# Patient Record
Sex: MALE | Race: BLACK OR AFRICAN AMERICAN | NOT HISPANIC OR LATINO | Employment: OTHER | URBAN - METROPOLITAN AREA
[De-identification: names, ages, dates, MRNs, and addresses within clinical notes are randomized per-mention and may not be internally consistent; named-entity substitution may affect disease eponyms.]

---

## 2019-05-25 ENCOUNTER — HOSPITAL ENCOUNTER (INPATIENT)
Facility: HOSPITAL | Age: 64
LOS: 2 days | Discharge: HOME | DRG: 287 | End: 2019-05-29
Attending: FAMILY MEDICINE | Admitting: INTERNAL MEDICINE
Payer: MEDICARE

## 2019-05-25 DIAGNOSIS — I50.21 ACUTE SYSTOLIC CONGESTIVE HEART FAILURE: ICD-10-CM

## 2019-05-25 DIAGNOSIS — R06.02 SHORTNESS OF BREATH: ICD-10-CM

## 2019-05-25 DIAGNOSIS — I49.9 CARDIAC ARRHYTHMIA, UNSPECIFIED CARDIAC ARRHYTHMIA TYPE: ICD-10-CM

## 2019-05-25 DIAGNOSIS — I50.42 CHRONIC COMBINED SYSTOLIC AND DIASTOLIC CONGESTIVE HEART FAILURE: ICD-10-CM

## 2019-05-25 DIAGNOSIS — E87.3 METABOLIC ALKALOSIS: ICD-10-CM

## 2019-05-25 DIAGNOSIS — I49.9 CARDIAC RHYTHM DISORDER OR DISTURBANCE OR CHANGE: ICD-10-CM

## 2019-05-25 DIAGNOSIS — J45.41 MODERATE PERSISTENT ASTHMA WITH EXACERBATION: ICD-10-CM

## 2019-05-25 DIAGNOSIS — I50.9 ACUTE CONGESTIVE HEART FAILURE, UNSPECIFIED HEART FAILURE TYPE: Primary | ICD-10-CM

## 2019-05-25 DIAGNOSIS — E87.79 OTHER HYPERVOLEMIA: ICD-10-CM

## 2019-05-25 PROBLEM — R60.9 EDEMA: Status: ACTIVE | Noted: 2019-05-25

## 2019-05-25 LAB
ALBUMIN SERPL BCP-MCNC: 3 G/DL (ref 3.5–5.2)
ALP SERPL-CCNC: 112 U/L (ref 38–126)
ALT SERPL W/O P-5'-P-CCNC: 63 U/L (ref 10–44)
ANION GAP SERPL CALC-SCNC: 1 MMOL/L (ref 8–16)
AST SERPL-CCNC: 33 U/L (ref 15–46)
BASOPHILS # BLD AUTO: 0.03 K/UL (ref 0–0.2)
BASOPHILS NFR BLD: 0.4 % (ref 0–1.9)
BILIRUB SERPL-MCNC: 0.5 MG/DL (ref 0.1–1)
BUN SERPL-MCNC: 19 MG/DL (ref 2–20)
CALCIUM SERPL-MCNC: 8.6 MG/DL (ref 8.7–10.5)
CHLORIDE SERPL-SCNC: 104 MMOL/L (ref 95–110)
CO2 SERPL-SCNC: 34 MMOL/L (ref 23–29)
CREAT SERPL-MCNC: 1.28 MG/DL (ref 0.5–1.4)
D DIMER PPP FEU-MCNC: 254 NG/ML
DIFFERENTIAL METHOD: ABNORMAL
EOSINOPHIL # BLD AUTO: 0.6 K/UL (ref 0–0.5)
EOSINOPHIL NFR BLD: 8.7 % (ref 0–8)
ERYTHROCYTE [DISTWIDTH] IN BLOOD BY AUTOMATED COUNT: 16.6 % (ref 11.5–14.5)
EST. GFR  (AFRICAN AMERICAN): >60 ML/MIN/1.73 M^2
EST. GFR  (NON AFRICAN AMERICAN): 59.6 ML/MIN/1.73 M^2
GLUCOSE SERPL-MCNC: 99 MG/DL (ref 70–110)
HCT VFR BLD AUTO: 42.9 % (ref 40–54)
HGB BLD-MCNC: 13.8 G/DL (ref 14–18)
INR PPP: 1.1 (ref 0.8–1.2)
LYMPHOCYTES # BLD AUTO: 1.5 K/UL (ref 1–4.8)
LYMPHOCYTES NFR BLD: 21 % (ref 18–48)
MCH RBC QN AUTO: 26.7 PG (ref 27–31)
MCHC RBC AUTO-ENTMCNC: 32.2 G/DL (ref 32–36)
MCV RBC AUTO: 83 FL (ref 82–98)
MONOCYTES # BLD AUTO: 0.6 K/UL (ref 0.3–1)
MONOCYTES NFR BLD: 8 % (ref 4–15)
NEUTROPHILS # BLD AUTO: 4.5 K/UL (ref 1.8–7.7)
NEUTROPHILS NFR BLD: 61.8 % (ref 38–73)
NT-PROBNP: 4680 PG/ML (ref 5–900)
PLATELET # BLD AUTO: 174 K/UL (ref 150–350)
PMV BLD AUTO: 12.8 FL (ref 9.2–12.9)
POTASSIUM SERPL-SCNC: 3.8 MMOL/L (ref 3.5–5.1)
PROT SERPL-MCNC: 5.5 G/DL (ref 6–8.4)
PROTHROMBIN TIME: 11.6 SEC (ref 9–12.5)
RBC # BLD AUTO: 5.16 M/UL (ref 4.6–6.2)
SODIUM SERPL-SCNC: 139 MMOL/L (ref 136–145)
TROPONIN I SERPL DL<=0.01 NG/ML-MCNC: <0.012 NG/ML (ref 0.01–0.03)
WBC # BLD AUTO: 7.25 K/UL (ref 3.9–12.7)

## 2019-05-25 PROCEDURE — 85610 PROTHROMBIN TIME: CPT | Mod: ER

## 2019-05-25 PROCEDURE — 99285 EMERGENCY DEPT VISIT HI MDM: CPT | Mod: 25,ER

## 2019-05-25 PROCEDURE — 85025 COMPLETE CBC W/AUTO DIFF WBC: CPT | Mod: ER

## 2019-05-25 PROCEDURE — 93010 ELECTROCARDIOGRAM REPORT: CPT | Mod: 76,,, | Performed by: INTERNAL MEDICINE

## 2019-05-25 PROCEDURE — 83880 ASSAY OF NATRIURETIC PEPTIDE: CPT | Mod: ER

## 2019-05-25 PROCEDURE — 96374 THER/PROPH/DIAG INJ IV PUSH: CPT | Mod: ER

## 2019-05-25 PROCEDURE — 84484 ASSAY OF TROPONIN QUANT: CPT | Mod: ER

## 2019-05-25 PROCEDURE — 25000003 PHARM REV CODE 250: Mod: ER | Performed by: FAMILY MEDICINE

## 2019-05-25 PROCEDURE — G0378 HOSPITAL OBSERVATION PER HR: HCPCS

## 2019-05-25 PROCEDURE — 63600175 PHARM REV CODE 636 W HCPCS: Performed by: STUDENT IN AN ORGANIZED HEALTH CARE EDUCATION/TRAINING PROGRAM

## 2019-05-25 PROCEDURE — 96376 TX/PRO/DX INJ SAME DRUG ADON: CPT

## 2019-05-25 PROCEDURE — 94640 AIRWAY INHALATION TREATMENT: CPT

## 2019-05-25 PROCEDURE — 93005 ELECTROCARDIOGRAM TRACING: CPT | Mod: ER

## 2019-05-25 PROCEDURE — 63600175 PHARM REV CODE 636 W HCPCS: Performed by: FAMILY MEDICINE

## 2019-05-25 PROCEDURE — 25500020 PHARM REV CODE 255: Mod: ER | Performed by: FAMILY MEDICINE

## 2019-05-25 PROCEDURE — 94761 N-INVAS EAR/PLS OXIMETRY MLT: CPT

## 2019-05-25 PROCEDURE — 93010 EKG 12-LEAD: ICD-10-PCS | Mod: 76,,, | Performed by: INTERNAL MEDICINE

## 2019-05-25 PROCEDURE — 63600175 PHARM REV CODE 636 W HCPCS: Mod: ER | Performed by: FAMILY MEDICINE

## 2019-05-25 PROCEDURE — 96372 THER/PROPH/DIAG INJ SC/IM: CPT | Mod: 59

## 2019-05-25 PROCEDURE — 99900035 HC TECH TIME PER 15 MIN (STAT)

## 2019-05-25 PROCEDURE — 80053 COMPREHEN METABOLIC PANEL: CPT | Mod: ER

## 2019-05-25 PROCEDURE — 94760 N-INVAS EAR/PLS OXIMETRY 1: CPT | Mod: ER

## 2019-05-25 PROCEDURE — 25000242 PHARM REV CODE 250 ALT 637 W/ HCPCS: Performed by: STUDENT IN AN ORGANIZED HEALTH CARE EDUCATION/TRAINING PROGRAM

## 2019-05-25 PROCEDURE — 85379 FIBRIN DEGRADATION QUANT: CPT | Mod: ER

## 2019-05-25 RX ORDER — FUROSEMIDE 10 MG/ML
40 INJECTION INTRAMUSCULAR; INTRAVENOUS ONCE
Status: COMPLETED | OUTPATIENT
Start: 2019-05-25 | End: 2019-05-25

## 2019-05-25 RX ORDER — ALBUTEROL SULFATE 90 UG/1
2 AEROSOL, METERED RESPIRATORY (INHALATION) EVERY 6 HOURS PRN
Status: DISCONTINUED | OUTPATIENT
Start: 2019-05-25 | End: 2019-05-29 | Stop reason: HOSPADM

## 2019-05-25 RX ORDER — FUROSEMIDE 20 MG/1
20 TABLET ORAL 2 TIMES DAILY
Status: DISCONTINUED | OUTPATIENT
Start: 2019-05-26 | End: 2019-05-27

## 2019-05-25 RX ORDER — FUROSEMIDE 10 MG/ML
80 INJECTION INTRAMUSCULAR; INTRAVENOUS
Status: COMPLETED | OUTPATIENT
Start: 2019-05-25 | End: 2019-05-25

## 2019-05-25 RX ORDER — FUROSEMIDE 20 MG/1
20 TABLET ORAL 2 TIMES DAILY
Status: DISCONTINUED | OUTPATIENT
Start: 2019-05-25 | End: 2019-05-25

## 2019-05-25 RX ORDER — LISINOPRIL 20 MG/1
20 TABLET ORAL DAILY
Status: DISCONTINUED | OUTPATIENT
Start: 2019-05-26 | End: 2019-05-29 | Stop reason: HOSPADM

## 2019-05-25 RX ORDER — SODIUM CHLORIDE 0.9 % (FLUSH) 0.9 %
10 SYRINGE (ML) INJECTION
Status: DISCONTINUED | OUTPATIENT
Start: 2019-05-25 | End: 2019-05-29 | Stop reason: HOSPADM

## 2019-05-25 RX ORDER — FLUTICASONE FUROATE AND VILANTEROL 100; 25 UG/1; UG/1
1 POWDER RESPIRATORY (INHALATION) DAILY
Status: DISCONTINUED | OUTPATIENT
Start: 2019-05-26 | End: 2019-05-29 | Stop reason: HOSPADM

## 2019-05-25 RX ORDER — ENOXAPARIN SODIUM 100 MG/ML
40 INJECTION SUBCUTANEOUS EVERY 24 HOURS
Status: DISCONTINUED | OUTPATIENT
Start: 2019-05-25 | End: 2019-05-29 | Stop reason: HOSPADM

## 2019-05-25 RX ORDER — ASPIRIN 325 MG
325 TABLET, DELAYED RELEASE (ENTERIC COATED) ORAL
Status: COMPLETED | OUTPATIENT
Start: 2019-05-25 | End: 2019-05-25

## 2019-05-25 RX ADMIN — ENOXAPARIN SODIUM 40 MG: 100 INJECTION SUBCUTANEOUS at 05:05

## 2019-05-25 RX ADMIN — FUROSEMIDE 80 MG: 10 INJECTION, SOLUTION INTRAMUSCULAR; INTRAVENOUS at 11:05

## 2019-05-25 RX ADMIN — IOHEXOL 100 ML: 350 INJECTION, SOLUTION INTRAVENOUS at 01:05

## 2019-05-25 RX ADMIN — FUROSEMIDE 40 MG: 10 INJECTION, SOLUTION INTRAMUSCULAR; INTRAVENOUS at 05:05

## 2019-05-25 RX ADMIN — ALBUTEROL SULFATE 2 PUFF: 90 AEROSOL, METERED RESPIRATORY (INHALATION) at 08:05

## 2019-05-25 RX ADMIN — ASPIRIN 325 MG: 325 TABLET, COATED ORAL at 01:05

## 2019-05-25 NOTE — PLAN OF CARE
Problem: Adult Inpatient Plan of Care  Goal: Plan of Care Review  Pt AAO x 4.  VSS.  Pt remained afebrile throughout this shift.   Pt remained free of falls this shift.    Plan of care reviewed. Patient verbalizes understanding.   Pt moving/turing independently. Frequent weight shifting encouraged.  Patient SR on monitor.   Bed low, side rails up x 2, wheels locked, call light in reach.   Pt family member remained at bedside most of shift.   Bed alarm maintained for safety.   Patient instructed to call for assistance.   Hourly rounding completed.   .Will continue to monitor.

## 2019-05-25 NOTE — ED PROVIDER NOTES
"Encounter Date: 5/25/2019       History     Chief Complaint   Patient presents with    ankle/feet swelling     "My ankles swole up and my feet for like a week now" also c/o SOB x 2 days "but I got asthma"     63-year-old male presents with chief complaint ankle swelling which started 1 week ago.  Patient denies any chest pain but does report shortness of breath.  Reports does have a history of asthma.  Denies any fever chills.        Review of patient's allergies indicates:  No Known Allergies  Past Medical History:   Diagnosis Date    Asthma     COPD (chronic obstructive pulmonary disease)      No past surgical history on file.  No family history on file.  Social History     Tobacco Use    Smoking status: Never Smoker   Substance Use Topics    Alcohol use: No    Drug use: No     Review of Systems   Respiratory: Positive for cough and shortness of breath. Negative for wheezing.    Cardiovascular: Negative for chest pain.   All other systems reviewed and are negative.      Physical Exam     Initial Vitals [05/25/19 1113]   BP Pulse Resp Temp SpO2   (!) 141/83 97 (!) 22 98.1 °F (36.7 °C) 97 %      MAP       --         Physical Exam    Nursing note and vitals reviewed.  Constitutional: He appears well-developed and well-nourished.   HENT:   Head: Normocephalic.   Eyes: Conjunctivae and EOM are normal. Pupils are equal, round, and reactive to light.   Neck: Normal range of motion. Neck supple.   Cardiovascular: Normal rate and regular rhythm. Exam reveals gallop and S3.    4+ pitting edema bilaterally   Pulmonary/Chest: Breath sounds normal.   Abdominal: Soft. Bowel sounds are normal.   Musculoskeletal: Normal range of motion. He exhibits edema.   Neurological: He is alert and oriented to person, place, and time. He has normal strength. GCS score is 15. GCS eye subscore is 4. GCS verbal subscore is 5. GCS motor subscore is 6.   Skin: Skin is warm and dry. Capillary refill takes less than 2 seconds.   Psychiatric: " He has a normal mood and affect. His behavior is normal.         ED Course   Procedures  Labs Reviewed   CBC W/ AUTO DIFFERENTIAL - Abnormal; Notable for the following components:       Result Value    Hemoglobin 13.8 (*)     Mean Corpuscular Hemoglobin 26.7 (*)     RDW 16.6 (*)     Eos # 0.6 (*)     Eosinophil% 8.7 (*)     All other components within normal limits   COMPREHENSIVE METABOLIC PANEL - Abnormal; Notable for the following components:    CO2 34 (*)     Calcium 8.6 (*)     Total Protein 5.5 (*)     Albumin 3.0 (*)     ALT 63 (*)     Anion Gap 1 (*)     eGFR if non  59.6 (*)     All other components within normal limits   NT-PRO NATRIURETIC PEPTIDE - Abnormal; Notable for the following components:    NT-proBNP 4680 (*)     All other components within normal limits   TROPONIN I   PROTIME-INR   D DIMER     EKG Readings: (Independently Interpreted)   Normal sinus rhythm at 94 beats per minute normal P normal QRS right atrial enlargement nonspecific intraventricular block anterior lateral Q-waves noted       Imaging Results          X-Ray Chest AP Portable (Final result)  Result time 05/25/19 11:44:49    Final result by Dominguez Valencia III, MD (05/25/19 11:44:49)                 Impression:      Cardiomegaly without evidence of pulmonary edema.      Electronically signed by: Dominguez Valencia MD  Date:    05/25/2019  Time:    11:44             Narrative:    EXAMINATION:  XR CHEST AP PORTABLE    CLINICAL HISTORY:  CHF;    COMPARISON:  01/08/2015    FINDINGS:  There is mild cardiomegaly without evidence of pulmonary edema.  The lungs appear clear of active disease. No acute appearing infiltrate, pleural effusion or pneumothorax identified.                                 Medical Decision Making:   Differential Diagnosis:   CHF, pulmonary embolism, peripheral edema, cellulitis  Clinical Tests:   Lab Tests: Ordered and Reviewed  The following lab test(s) were unremarkable: CBC, CMP and  BNP  Radiological Study: Reviewed and Ordered  Medical Tests: Ordered and Reviewed  ED Management:  Patient initially seen and managed.  Signs and symptoms consistent with congestive heart failure likely right-sided heart failure.  In light of patient's history CPAP and are home oxygen therapy which the patient has been noncompliant with suspected significant right-sided failure.  Will also obtain a CT angiogram in light of the slightly elevated D-dimer, sedentary lifestyle and significantly elevated BNP.  Other:   I have discussed this case with another health care provider.       <> Summary of the Discussion: Discussed with Dr. Anaya who agrees to admit the patient to the telemetry unit for additional evaluation and treatment                      Clinical Impression:       ICD-10-CM ICD-9-CM   1. Acute congestive heart failure, unspecified heart failure type I50.9 428.0   2. Shortness of breath R06.02 786.05                                Skyler Dhillon MD  05/26/19 0642       Skyler Dhillon MD  05/26/19 0644
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
## 2019-05-26 LAB
ALBUMIN SERPL BCP-MCNC: 3.1 G/DL (ref 3.5–5.2)
ALP SERPL-CCNC: 83 U/L (ref 55–135)
ALT SERPL W/O P-5'-P-CCNC: 47 U/L (ref 10–44)
ANION GAP SERPL CALC-SCNC: 9 MMOL/L (ref 8–16)
AST SERPL-CCNC: 23 U/L (ref 10–40)
BASOPHILS # BLD AUTO: 0.02 K/UL (ref 0–0.2)
BASOPHILS NFR BLD: 0.3 % (ref 0–1.9)
BILIRUB SERPL-MCNC: 0.8 MG/DL (ref 0.1–1)
BUN SERPL-MCNC: 20 MG/DL (ref 8–23)
CALCIUM SERPL-MCNC: 9.1 MG/DL (ref 8.7–10.5)
CHLORIDE SERPL-SCNC: 99 MMOL/L (ref 95–110)
CO2 SERPL-SCNC: 34 MMOL/L (ref 23–29)
CREAT SERPL-MCNC: 1.4 MG/DL (ref 0.5–1.4)
DIFFERENTIAL METHOD: ABNORMAL
EOSINOPHIL # BLD AUTO: 0.9 K/UL (ref 0–0.5)
EOSINOPHIL NFR BLD: 12.2 % (ref 0–8)
ERYTHROCYTE [DISTWIDTH] IN BLOOD BY AUTOMATED COUNT: 15.9 % (ref 11.5–14.5)
EST. GFR  (AFRICAN AMERICAN): >60 ML/MIN/1.73 M^2
EST. GFR  (NON AFRICAN AMERICAN): 53 ML/MIN/1.73 M^2
GLUCOSE SERPL-MCNC: 94 MG/DL (ref 70–110)
HCT VFR BLD AUTO: 47.2 % (ref 40–54)
HGB BLD-MCNC: 15 G/DL (ref 14–18)
LYMPHOCYTES # BLD AUTO: 1.7 K/UL (ref 1–4.8)
LYMPHOCYTES NFR BLD: 23.8 % (ref 18–48)
MAGNESIUM SERPL-MCNC: 1.8 MG/DL (ref 1.6–2.6)
MCH RBC QN AUTO: 26.6 PG (ref 27–31)
MCHC RBC AUTO-ENTMCNC: 31.8 G/DL (ref 32–36)
MCV RBC AUTO: 84 FL (ref 82–98)
MONOCYTES # BLD AUTO: 0.4 K/UL (ref 0.3–1)
MONOCYTES NFR BLD: 5.8 % (ref 4–15)
NEUTROPHILS # BLD AUTO: 4 K/UL (ref 1.8–7.7)
NEUTROPHILS NFR BLD: 57.9 % (ref 38–73)
PLATELET # BLD AUTO: 198 K/UL (ref 150–350)
PLATELET BLD QL SMEAR: ABNORMAL
PMV BLD AUTO: 12.4 FL (ref 9.2–12.9)
POTASSIUM SERPL-SCNC: 3.7 MMOL/L (ref 3.5–5.1)
PROT SERPL-MCNC: 5.9 G/DL (ref 6–8.4)
RBC # BLD AUTO: 5.64 M/UL (ref 4.6–6.2)
SODIUM SERPL-SCNC: 142 MMOL/L (ref 136–145)
WBC # BLD AUTO: 6.94 K/UL (ref 3.9–12.7)

## 2019-05-26 PROCEDURE — 99204 PR OFFICE/OUTPT VISIT, NEW, LEVL IV, 45-59 MIN: ICD-10-PCS | Mod: ,,, | Performed by: INTERNAL MEDICINE

## 2019-05-26 PROCEDURE — 25000003 PHARM REV CODE 250: Performed by: STUDENT IN AN ORGANIZED HEALTH CARE EDUCATION/TRAINING PROGRAM

## 2019-05-26 PROCEDURE — 94761 N-INVAS EAR/PLS OXIMETRY MLT: CPT

## 2019-05-26 PROCEDURE — 99900035 HC TECH TIME PER 15 MIN (STAT)

## 2019-05-26 PROCEDURE — G0378 HOSPITAL OBSERVATION PER HR: HCPCS

## 2019-05-26 PROCEDURE — 85025 COMPLETE CBC W/AUTO DIFF WBC: CPT

## 2019-05-26 PROCEDURE — 94640 AIRWAY INHALATION TREATMENT: CPT

## 2019-05-26 PROCEDURE — 63600175 PHARM REV CODE 636 W HCPCS: Performed by: FAMILY MEDICINE

## 2019-05-26 PROCEDURE — 25000242 PHARM REV CODE 250 ALT 637 W/ HCPCS: Performed by: STUDENT IN AN ORGANIZED HEALTH CARE EDUCATION/TRAINING PROGRAM

## 2019-05-26 PROCEDURE — 80053 COMPREHEN METABOLIC PANEL: CPT

## 2019-05-26 PROCEDURE — 96372 THER/PROPH/DIAG INJ SC/IM: CPT

## 2019-05-26 PROCEDURE — 99204 OFFICE O/P NEW MOD 45 MIN: CPT | Mod: ,,, | Performed by: INTERNAL MEDICINE

## 2019-05-26 PROCEDURE — 36415 COLL VENOUS BLD VENIPUNCTURE: CPT

## 2019-05-26 PROCEDURE — 83735 ASSAY OF MAGNESIUM: CPT

## 2019-05-26 RX ORDER — CARVEDILOL 3.12 MG/1
3.12 TABLET ORAL 2 TIMES DAILY
Status: DISCONTINUED | OUTPATIENT
Start: 2019-05-26 | End: 2019-05-29 | Stop reason: HOSPADM

## 2019-05-26 RX ORDER — FLUTICASONE PROPIONATE 50 MCG
2 SPRAY, SUSPENSION (ML) NASAL DAILY
Status: DISCONTINUED | OUTPATIENT
Start: 2019-05-26 | End: 2019-05-29 | Stop reason: HOSPADM

## 2019-05-26 RX ADMIN — FLUTICASONE FUROATE AND VILANTEROL TRIFENATATE 1 PUFF: 100; 25 POWDER RESPIRATORY (INHALATION) at 07:05

## 2019-05-26 RX ADMIN — LISINOPRIL 20 MG: 20 TABLET ORAL at 08:05

## 2019-05-26 RX ADMIN — ENOXAPARIN SODIUM 40 MG: 100 INJECTION SUBCUTANEOUS at 04:05

## 2019-05-26 RX ADMIN — CARVEDILOL 3.12 MG: 3.12 TABLET, FILM COATED ORAL at 08:05

## 2019-05-26 RX ADMIN — FUROSEMIDE 20 MG: 20 TABLET ORAL at 05:05

## 2019-05-26 RX ADMIN — FUROSEMIDE 20 MG: 20 TABLET ORAL at 08:05

## 2019-05-26 NOTE — HPI
63 year old male without a history of heart disease presenting with clinical symptoms suggestive of CHF of the past 2 weeks. + edema + short of breath.         + etoh     - tobacco    -htn

## 2019-05-26 NOTE — CONSULTS
[facility]  Cardiology  Consult Note    Patient Name: Dima Quintanilla  MRN: [id number]  Admission Date: 5/25/2019  Hospital Length of Stay: 0 days  Code Status: Full Code   Attending Provider: Jordin Kincaid MD   Consulting Provider: FERNANDA Soni ANP  Primary Care Physician: Sweetie Riojas MD  Principal Problem:Acute congestive heart failure      Inpatient consult to [facility]  Consult performed by: FERNANDA Randle, ANP  Consult ordered by: Nico Payne MD        See full consult note dated 5/26/2019 by Dr. Nikunj Pabon

## 2019-05-26 NOTE — ASSESSMENT & PLAN NOTE
Responded to diuresis  Will obtain echo  Dietary education        Agree with excellent management that initiated thus far     Acei   Beta-blocker       Obtain lipid panel  HgA1c          Further recommendations to follow

## 2019-05-26 NOTE — HOSPITAL COURSE
5/25/2019-5/26/2019 Presented with SOB and LE edema. Aggressively diuresed with IV Lasix with good response. Transitioned to oral Lasix. Concerned about CHF-systolic etiology therefore echocardiogram orderd  5/27/2019 On Coreg BID along with daily Lisinopril with transition to oral Lasix BID. 650cc out overnight and negative 4.1 liters since admission. Echocardiogram today with following results:   · Severely decreased left ventricular systolic function. The estimated ejection fraction is 10%  · Grade III (severe) left ventricular diastolic dysfunction consistent with restrictive physiology.  · Moderate left atrial enlargement.  · Moderate mitral regurgitation.  · Septal wall has abnormal motion consistent with left bundle branch block.  · Severe global hypokinetic wall motion.  · Low normal right ventricular systolic function.  · Normal central venous pressure (3 mm Hg).  · The estimated PA systolic pressure is 27 mm Hg     Discussion in regards to concern of severely depressed LVEF and recommend LHC for further evaluation-will plan to proceed tomorrow   5/28/2019 NPO for LHC today  5/29/2019 Underwent LHC yesterday via right radial access with normal coronaries and LVEF 10-15% with LVEDP 8 mmHg. Continued on GDMT. On oral Lasix with 550cc out overnight negative 6.0 liters since admission. Right radial access stable. Will ambulate in hallway

## 2019-05-26 NOTE — H&P (VIEW-ONLY)
[facility]  Cardiology  Consult Note    Patient Name: Dima Quintanilla  MRN: [id number]  Admission Date: 5/25/2019  Hospital Length of Stay: 0 days  Code Status: Full Code   Attending Provider: KERRIU IM   Consulting Provider: Nikunj Pabon MD  Primary Care Physician: Sweetie Riojas MD  Principal Problem:Acute congestive heart failure    Patient information was obtained from patient and chart review     Consults  Subjective:     Chief Complaint:  Shortness of breath + edema     HPI:   63 year old male without a history of heart disease presenting with clinical symptoms suggestive of CHF of the past 2 weeks. + edema + short of breath.         + etoh     - tobacco    -htn        Past Medical History:   Diagnosis Date    Asthma     COPD (chronic obstructive pulmonary disease)        History reviewed. No pertinent surgical history.    Review of patient's allergies indicates:  No Known Allergies    No current facility-administered medications on file prior to encounter.      Current Outpatient Medications on File Prior to Encounter   Medication Sig    albuterol (ACCUNEB) 0.63 mg/3 mL Nebu Take 3 mLs (0.63 mg total) by nebulization every 6 (six) hours as needed.    albuterol (PROAIR HFA) 90 mcg/actuation inhaler Inhale 2 puffs into the lungs every 6 (six) hours as needed for Wheezing or Shortness of Breath (or cough).    budesonide-formoterol 160-4.5 mcg (SYMBICORT) 160-4.5 mcg/actuation HFAA Inhale 2 puffs into the lungs every 12 (twelve) hours.    lisinopril (PRINIVIL,ZESTRIL) 20 MG tablet Take 1 tablet (20 mg total) by mouth once daily.    predniSONE (DELTASONE) 20 MG tablet 3 tablets po by mouth daily x 3 days  2 tabs by mouth daily x 3 days  1 tablet by mouth daily x 3 days     Family History     None        Tobacco Use    Smoking status: Never Smoker    Smokeless tobacco: Never Used   Substance and Sexual Activity    Alcohol use: Yes     Alcohol/week: 5.4 oz     Types: 9 Cans of beer per week      Comment: Stated he drinks on Friday, Saturday, & Sunday.     Drug use: No    Sexual activity: Not on file     Review of Systems   Constitution: Negative for diaphoresis, night sweats, weight gain and weight loss.   HENT: Negative for congestion.    Eyes: Negative for blurred vision, discharge and double vision.   Cardiovascular: Negative for chest pain, claudication, cyanosis, dyspnea on exertion, irregular heartbeat, leg swelling, near-syncope, orthopnea, palpitations, paroxysmal nocturnal dyspnea and syncope.   Respiratory: Negative for cough, shortness of breath and wheezing.    Endocrine: Negative for cold intolerance, heat intolerance and polyphagia.   Hematologic/Lymphatic: Negative for adenopathy and bleeding problem. Does not bruise/bleed easily.   Skin: Negative for dry skin and nail changes.   Musculoskeletal: Negative for arthritis, back pain, falls, joint pain, myalgias and neck pain.   Gastrointestinal: Negative for bloating, abdominal pain, change in bowel habit and constipation.   Genitourinary: Negative for bladder incontinence, dysuria, flank pain, genital sores and missed menses.   Neurological: Negative for aphonia, brief paralysis, difficulty with concentration, dizziness and weakness.   Psychiatric/Behavioral: Negative for altered mental status and memory loss. The patient does not have insomnia.    Allergic/Immunologic: Negative for environmental allergies.     Objective:     Vital Signs (Most Recent):  Temp: 97.2 °F (36.2 °C) (05/26/19 1212)  Pulse: 76 (05/26/19 1212)  Resp: 16 (05/26/19 1212)  BP: 117/71 (05/26/19 1212)  SpO2: 97 % (05/26/19 1123) Vital Signs (24h Range):  Temp:  [96 °F (35.6 °C)-99.1 °F (37.3 °C)] 97.2 °F (36.2 °C)  Pulse:  [] 76  Resp:  [16-20] 16  SpO2:  [94 %-98 %] 97 %  BP: (117-142)/(71-98) 117/71     Weight: 67.5 kg (148 lb 13 oz)  Body mass index is 24.02 kg/m².    SpO2: 97 %  O2 Device (Oxygen Therapy): room air      Intake/Output Summary (Last 24 hours)  at 5/26/2019 1328  Last data filed at 5/26/2019 1200  Gross per 24 hour   Intake 860 ml   Output 3900 ml   Net -3040 ml       Lines/Drains/Airways     Peripheral Intravenous Line                 Peripheral IV - Single Lumen 05/25/19 1137 20 G Left Antecubital 1 day                Physical Exam   Constitutional: He is oriented to person, place, and time. He appears well-developed and well-nourished. He is not intubated.   HENT:   Head: Normocephalic and atraumatic.   Right Ear: External ear normal.   Left Ear: External ear normal.   Mouth/Throat: Oropharynx is clear and moist.   Eyes: Pupils are equal, round, and reactive to light. Conjunctivae and EOM are normal. Right eye exhibits no discharge. Left eye exhibits no discharge. No scleral icterus.   Neck: Normal range of motion. Neck supple. Normal carotid pulses, no hepatojugular reflux and no JVD present. Carotid bruit is not present. No tracheal deviation present. No thyromegaly present.   Cardiovascular: Normal rate, regular rhythm, S1 normal and S2 normal.  No extrasystoles are present. PMI is not displaced. Exam reveals no gallop, no S3, no distant heart sounds, no friction rub and no midsystolic click.   No murmur heard.  Pulses:       Carotid pulses are 2+ on the right side, and 2+ on the left side.       Radial pulses are 2+ on the right side, and 2+ on the left side.        Femoral pulses are 2+ on the right side, and 2+ on the left side.       Popliteal pulses are 2+ on the right side, and 2+ on the left side.        Dorsalis pedis pulses are 2+ on the right side, and 2+ on the left side.        Posterior tibial pulses are 2+ on the right side, and 2+ on the left side.   Pulmonary/Chest: Effort normal and breath sounds normal. No accessory muscle usage or stridor. No apnea, no tachypnea and no bradypnea. He is not intubated. No respiratory distress. He has no decreased breath sounds. He has no wheezes. He has no rales. He exhibits no tenderness and no bony  tenderness.   Abdominal: He exhibits no distension, no pulsatile liver, no abdominal bruit, no ascites, no pulsatile midline mass and no mass. There is no tenderness. There is no rebound and no guarding.   Musculoskeletal: Normal range of motion. He exhibits no edema or tenderness.   Lymphadenopathy:     He has no cervical adenopathy.   Neurological: He is alert and oriented to person, place, and time. He has normal reflexes. No cranial nerve deficit. Coordination normal.   Skin: Skin is warm. No rash noted. No erythema. No pallor.   Psychiatric: He has a normal mood and affect. His behavior is normal. Judgment and thought content normal.       Significant Labs:       LABS  CBC  Recent Labs   Lab 05/25/19 1137 05/26/19  0450   WBC 7.25 6.94   RBC 5.16 5.64   HGB 13.8* 15.0   HCT 42.9 47.2    198   MCV 83 84   MCH 26.7* 26.6*   MCHC 32.2 31.8*     BMP  Recent Labs   Lab 05/25/19 1137 05/26/19  0450    142   K 3.8 3.7   CO2 34* 34*    99   BUN 19 20   CREATININE 1.28 1.4   GLU 99 94       POCT-Glucose  No results found for: POCTGLUCOSE    Recent Labs   Lab 05/25/19 1137 05/26/19  0450   CALCIUM 8.6* 9.1   MG  --  1.8     LFT  Recent Labs   Lab 05/25/19 1137 05/26/19  0450   PROT 5.5* 5.9*   ALBUMIN 3.0* 3.1*   BILITOT 0.5 0.8   AST 33 23   ALKPHOS 112 83   ALT 63* 47*     GFR     COAGS  Recent Labs   Lab 05/25/19  1137   INR 1.1     CE  Recent Labs   Lab 05/25/19  1137   TROPONINI <0.012         Significant Imaging:       Imaging Results          CTA Chest Non-Coronary (PE Study) (Final result)  Result time 05/25/19 13:52:06    Final result by Dominguez Valencia III, MD (05/25/19 13:52:06)                 Impression:      No evidence of pulmonary embolism.    Mild cardiomegaly with possible findings of right heart failure and minimal interstitial edema.      Electronically signed by: Dominguez Valencia MD  Date:    05/25/2019  Time:    13:52             Narrative:    EXAMINATION:  CTA CHEST NON  CORONARY    CLINICAL HISTORY:  Dyspnea, cardiac origin suspected;    TECHNIQUE:  Routine CT angiogram of the chest protocol performed after the IV administration of 100 mL Omnipaque 350. 3D reconstructions/reformats/MIPs obtained. All CT scans at this facility are performed  using dose modulation techniques as appropriate to performed exam including the following:  automated exposure control; adjustment of mA and/or kV according to the patients size (this includes techniques or standardized protocols for targeted exams where dose is matched to indication/reason for exam: i.e. extremities or head);  iterative reconstruction technique.    COMPARISON:  None    FINDINGS:  There is satisfactory opacification of the pulmonary arteries. There is no evidence of pulmonary embolism.  No aortic aneurysm or dissection is identified.  There is mild cardiomegaly.  There is reflux of intravenous contrast from the right atrium into the IVC and hepatic veins which can be seen with right heart failure.  There is no pericardial effusion.  No pathologically enlarged lymph nodes are seen in the chest.  There is mild bibasilar dependent atelectasis.  There is mild bilateral peribronchial thickening suggesting inflammatory airway disease or mild interstitial edema.  There is minimal interlobular septal thickening in the lung bases which suggests mild interstitial edema.  There is mild right apical pleural scarring.  No acute appearing alveolar infiltrate, pleural effusion or pneumothorax identified.   no acute osseous abnormality is seen. Limited images through the upper abdomen demonstrate no acute findings.                               X-Ray Chest AP Portable (Final result)  Result time 05/25/19 11:44:49    Final result by Dominguez Valencia III, MD (05/25/19 11:44:49)                 Impression:      Cardiomegaly without evidence of pulmonary edema.      Electronically signed by: Dominguez Valencia MD  Date:    05/25/2019  Time:    11:44              Narrative:    EXAMINATION:  XR CHEST AP PORTABLE    CLINICAL HISTORY:  CHF;    COMPARISON:  01/08/2015    FINDINGS:  There is mild cardiomegaly without evidence of pulmonary edema.  The lungs appear clear of active disease. No acute appearing infiltrate, pleural effusion or pneumothorax identified.                                  Assessment and Plan:     * Acute congestive heart failure      Responded to diuresis  Will obtain echo  Dietary education        Agree with excellent management that initiated thus far     Acei   Beta-blocker       Obtain lipid panel  HgA1c          Further recommendations to follow               VTE Risk Mitigation (From admission, onward)        Ordered     enoxaparin injection 40 mg  Daily      05/25/19 1606     IP VTE HIGH RISK PATIENT  Once      05/25/19 1606          ECG: NSR with LVH + repolarization      Thank you for your consult.     Nikunj Pabon MD  Cardiology   [facility]

## 2019-05-26 NOTE — PLAN OF CARE
Problem: Adult Inpatient Plan of Care  Goal: Plan of Care Review  Outcome: Ongoing (interventions implemented as appropriate)  Pt on room air in no apparent distress.  MDI tx. Given with good pt. Effort.  Will cont. To monitor.

## 2019-05-26 NOTE — SUBJECTIVE & OBJECTIVE
Past Medical History:   Diagnosis Date    Asthma     COPD (chronic obstructive pulmonary disease)        History reviewed. No pertinent surgical history.    Review of patient's allergies indicates:  No Known Allergies    No current facility-administered medications on file prior to encounter.      Current Outpatient Medications on File Prior to Encounter   Medication Sig    albuterol (ACCUNEB) 0.63 mg/3 mL Nebu Take 3 mLs (0.63 mg total) by nebulization every 6 (six) hours as needed.    albuterol (PROAIR HFA) 90 mcg/actuation inhaler Inhale 2 puffs into the lungs every 6 (six) hours as needed for Wheezing or Shortness of Breath (or cough).    budesonide-formoterol 160-4.5 mcg (SYMBICORT) 160-4.5 mcg/actuation HFAA Inhale 2 puffs into the lungs every 12 (twelve) hours.    lisinopril (PRINIVIL,ZESTRIL) 20 MG tablet Take 1 tablet (20 mg total) by mouth once daily.    predniSONE (DELTASONE) 20 MG tablet 3 tablets po by mouth daily x 3 days  2 tabs by mouth daily x 3 days  1 tablet by mouth daily x 3 days     Family History     None        Tobacco Use    Smoking status: Never Smoker    Smokeless tobacco: Never Used   Substance and Sexual Activity    Alcohol use: Yes     Alcohol/week: 5.4 oz     Types: 9 Cans of beer per week     Comment: Stated he drinks on Friday, Saturday, & Sunday.     Drug use: No    Sexual activity: Not on file     Review of Systems   Constitution: Negative for diaphoresis, night sweats, weight gain and weight loss.   HENT: Negative for congestion.    Eyes: Negative for blurred vision, discharge and double vision.   Cardiovascular: Negative for chest pain, claudication, cyanosis, dyspnea on exertion, irregular heartbeat, leg swelling, near-syncope, orthopnea, palpitations, paroxysmal nocturnal dyspnea and syncope.   Respiratory: Negative for cough, shortness of breath and wheezing.    Endocrine: Negative for cold intolerance, heat intolerance and polyphagia.   Hematologic/Lymphatic:  Negative for adenopathy and bleeding problem. Does not bruise/bleed easily.   Skin: Negative for dry skin and nail changes.   Musculoskeletal: Negative for arthritis, back pain, falls, joint pain, myalgias and neck pain.   Gastrointestinal: Negative for bloating, abdominal pain, change in bowel habit and constipation.   Genitourinary: Negative for bladder incontinence, dysuria, flank pain, genital sores and missed menses.   Neurological: Negative for aphonia, brief paralysis, difficulty with concentration, dizziness and weakness.   Psychiatric/Behavioral: Negative for altered mental status and memory loss. The patient does not have insomnia.    Allergic/Immunologic: Negative for environmental allergies.     Objective:     Vital Signs (Most Recent):  Temp: 97.2 °F (36.2 °C) (05/26/19 1212)  Pulse: 76 (05/26/19 1212)  Resp: 16 (05/26/19 1212)  BP: 117/71 (05/26/19 1212)  SpO2: 97 % (05/26/19 1123) Vital Signs (24h Range):  Temp:  [96 °F (35.6 °C)-99.1 °F (37.3 °C)] 97.2 °F (36.2 °C)  Pulse:  [] 76  Resp:  [16-20] 16  SpO2:  [94 %-98 %] 97 %  BP: (117-142)/(71-98) 117/71     Weight: 67.5 kg (148 lb 13 oz)  Body mass index is 24.02 kg/m².    SpO2: 97 %  O2 Device (Oxygen Therapy): room air      Intake/Output Summary (Last 24 hours) at 5/26/2019 1328  Last data filed at 5/26/2019 1200  Gross per 24 hour   Intake 860 ml   Output 3900 ml   Net -3040 ml       Lines/Drains/Airways     Peripheral Intravenous Line                 Peripheral IV - Single Lumen 05/25/19 1137 20 G Left Antecubital 1 day                Physical Exam   Constitutional: He is oriented to person, place, and time. He appears well-developed and well-nourished. He is not intubated.   HENT:   Head: Normocephalic and atraumatic.   Right Ear: External ear normal.   Left Ear: External ear normal.   Mouth/Throat: Oropharynx is clear and moist.   Eyes: Pupils are equal, round, and reactive to light. Conjunctivae and EOM are normal. Right eye exhibits no  discharge. Left eye exhibits no discharge. No scleral icterus.   Neck: Normal range of motion. Neck supple. Normal carotid pulses, no hepatojugular reflux and no JVD present. Carotid bruit is not present. No tracheal deviation present. No thyromegaly present.   Cardiovascular: Normal rate, regular rhythm, S1 normal and S2 normal.  No extrasystoles are present. PMI is not displaced. Exam reveals no gallop, no S3, no distant heart sounds, no friction rub and no midsystolic click.   No murmur heard.  Pulses:       Carotid pulses are 2+ on the right side, and 2+ on the left side.       Radial pulses are 2+ on the right side, and 2+ on the left side.        Femoral pulses are 2+ on the right side, and 2+ on the left side.       Popliteal pulses are 2+ on the right side, and 2+ on the left side.        Dorsalis pedis pulses are 2+ on the right side, and 2+ on the left side.        Posterior tibial pulses are 2+ on the right side, and 2+ on the left side.   Pulmonary/Chest: Effort normal and breath sounds normal. No accessory muscle usage or stridor. No apnea, no tachypnea and no bradypnea. He is not intubated. No respiratory distress. He has no decreased breath sounds. He has no wheezes. He has no rales. He exhibits no tenderness and no bony tenderness.   Abdominal: He exhibits no distension, no pulsatile liver, no abdominal bruit, no ascites, no pulsatile midline mass and no mass. There is no tenderness. There is no rebound and no guarding.   Musculoskeletal: Normal range of motion. He exhibits no edema or tenderness.   Lymphadenopathy:     He has no cervical adenopathy.   Neurological: He is alert and oriented to person, place, and time. He has normal reflexes. No cranial nerve deficit. Coordination normal.   Skin: Skin is warm. No rash noted. No erythema. No pallor.   Psychiatric: He has a normal mood and affect. His behavior is normal. Judgment and thought content normal.       Significant Labs:        LABS  CBC  Recent Labs   Lab 05/25/19  1137 05/26/19  0450   WBC 7.25 6.94   RBC 5.16 5.64   HGB 13.8* 15.0   HCT 42.9 47.2    198   MCV 83 84   MCH 26.7* 26.6*   MCHC 32.2 31.8*     BMP  Recent Labs   Lab 05/25/19  1137 05/26/19  0450    142   K 3.8 3.7   CO2 34* 34*    99   BUN 19 20   CREATININE 1.28 1.4   GLU 99 94       POCT-Glucose  No results found for: POCTGLUCOSE    Recent Labs   Lab 05/25/19  1137 05/26/19  0450   CALCIUM 8.6* 9.1   MG  --  1.8     LFT  Recent Labs   Lab 05/25/19  1137 05/26/19  0450   PROT 5.5* 5.9*   ALBUMIN 3.0* 3.1*   BILITOT 0.5 0.8   AST 33 23   ALKPHOS 112 83   ALT 63* 47*     GFR     COAGS  Recent Labs   Lab 05/25/19  1137   INR 1.1     CE  Recent Labs   Lab 05/25/19  1137   TROPONINI <0.012         Significant Imaging:       Imaging Results          CTA Chest Non-Coronary (PE Study) (Final result)  Result time 05/25/19 13:52:06    Final result by Dominguez Valencia III, MD (05/25/19 13:52:06)                 Impression:      No evidence of pulmonary embolism.    Mild cardiomegaly with possible findings of right heart failure and minimal interstitial edema.      Electronically signed by: Dominguez Valencia MD  Date:    05/25/2019  Time:    13:52             Narrative:    EXAMINATION:  CTA CHEST NON CORONARY    CLINICAL HISTORY:  Dyspnea, cardiac origin suspected;    TECHNIQUE:  Routine CT angiogram of the chest protocol performed after the IV administration of 100 mL Omnipaque 350. 3D reconstructions/reformats/MIPs obtained. All CT scans at this facility are performed  using dose modulation techniques as appropriate to performed exam including the following:  automated exposure control; adjustment of mA and/or kV according to the patients size (this includes techniques or standardized protocols for targeted exams where dose is matched to indication/reason for exam: i.e. extremities or head);  iterative reconstruction  technique.    COMPARISON:  None    FINDINGS:  There is satisfactory opacification of the pulmonary arteries. There is no evidence of pulmonary embolism.  No aortic aneurysm or dissection is identified.  There is mild cardiomegaly.  There is reflux of intravenous contrast from the right atrium into the IVC and hepatic veins which can be seen with right heart failure.  There is no pericardial effusion.  No pathologically enlarged lymph nodes are seen in the chest.  There is mild bibasilar dependent atelectasis.  There is mild bilateral peribronchial thickening suggesting inflammatory airway disease or mild interstitial edema.  There is minimal interlobular septal thickening in the lung bases which suggests mild interstitial edema.  There is mild right apical pleural scarring.  No acute appearing alveolar infiltrate, pleural effusion or pneumothorax identified.   no acute osseous abnormality is seen. Limited images through the upper abdomen demonstrate no acute findings.                               X-Ray Chest AP Portable (Final result)  Result time 05/25/19 11:44:49    Final result by Dominguez Valencia III, MD (05/25/19 11:44:49)                 Impression:      Cardiomegaly without evidence of pulmonary edema.      Electronically signed by: Dominguez Valencia MD  Date:    05/25/2019  Time:    11:44             Narrative:    EXAMINATION:  XR CHEST AP PORTABLE    CLINICAL HISTORY:  CHF;    COMPARISON:  01/08/2015    FINDINGS:  There is mild cardiomegaly without evidence of pulmonary edema.  The lungs appear clear of active disease. No acute appearing infiltrate, pleural effusion or pneumothorax identified.

## 2019-05-26 NOTE — PLAN OF CARE
Problem: Adult Inpatient Plan of Care  Goal: Plan of Care Review  Outcome: Ongoing (interventions implemented as appropriate)  Plan of care reviewed with patient, understanding verbalized. Pt remains SR on tele. No complaints or acute distress noted overnight. Instructed to call for any assistance, understanding verbalized.  Bed alarm on, call light in reach, fall precautions in place. Will continue to monitor.

## 2019-05-26 NOTE — CONSULTS
[facility]  Cardiology  Consult Note    Patient Name: Dima Quintanilla  MRN: [id number]  Admission Date: 5/25/2019  Hospital Length of Stay: 0 days  Code Status: Full Code   Attending Provider: KERRIU IM   Consulting Provider: Nikunj Pabon MD  Primary Care Physician: Sweetie Riojas MD  Principal Problem:Acute congestive heart failure    Patient information was obtained from patient and chart review     Consults  Subjective:     Chief Complaint:  Shortness of breath + edema     HPI:   63 year old male without a history of heart disease presenting with clinical symptoms suggestive of CHF of the past 2 weeks. + edema + short of breath.         + etoh     - tobacco    -htn        Past Medical History:   Diagnosis Date    Asthma     COPD (chronic obstructive pulmonary disease)        History reviewed. No pertinent surgical history.    Review of patient's allergies indicates:  No Known Allergies    No current facility-administered medications on file prior to encounter.      Current Outpatient Medications on File Prior to Encounter   Medication Sig    albuterol (ACCUNEB) 0.63 mg/3 mL Nebu Take 3 mLs (0.63 mg total) by nebulization every 6 (six) hours as needed.    albuterol (PROAIR HFA) 90 mcg/actuation inhaler Inhale 2 puffs into the lungs every 6 (six) hours as needed for Wheezing or Shortness of Breath (or cough).    budesonide-formoterol 160-4.5 mcg (SYMBICORT) 160-4.5 mcg/actuation HFAA Inhale 2 puffs into the lungs every 12 (twelve) hours.    lisinopril (PRINIVIL,ZESTRIL) 20 MG tablet Take 1 tablet (20 mg total) by mouth once daily.    predniSONE (DELTASONE) 20 MG tablet 3 tablets po by mouth daily x 3 days  2 tabs by mouth daily x 3 days  1 tablet by mouth daily x 3 days     Family History     None        Tobacco Use    Smoking status: Never Smoker    Smokeless tobacco: Never Used   Substance and Sexual Activity    Alcohol use: Yes     Alcohol/week: 5.4 oz     Types: 9 Cans of beer per week      Comment: Stated he drinks on Friday, Saturday, & Sunday.     Drug use: No    Sexual activity: Not on file     Review of Systems   Constitution: Negative for diaphoresis, night sweats, weight gain and weight loss.   HENT: Negative for congestion.    Eyes: Negative for blurred vision, discharge and double vision.   Cardiovascular: Negative for chest pain, claudication, cyanosis, dyspnea on exertion, irregular heartbeat, leg swelling, near-syncope, orthopnea, palpitations, paroxysmal nocturnal dyspnea and syncope.   Respiratory: Negative for cough, shortness of breath and wheezing.    Endocrine: Negative for cold intolerance, heat intolerance and polyphagia.   Hematologic/Lymphatic: Negative for adenopathy and bleeding problem. Does not bruise/bleed easily.   Skin: Negative for dry skin and nail changes.   Musculoskeletal: Negative for arthritis, back pain, falls, joint pain, myalgias and neck pain.   Gastrointestinal: Negative for bloating, abdominal pain, change in bowel habit and constipation.   Genitourinary: Negative for bladder incontinence, dysuria, flank pain, genital sores and missed menses.   Neurological: Negative for aphonia, brief paralysis, difficulty with concentration, dizziness and weakness.   Psychiatric/Behavioral: Negative for altered mental status and memory loss. The patient does not have insomnia.    Allergic/Immunologic: Negative for environmental allergies.     Objective:     Vital Signs (Most Recent):  Temp: 97.2 °F (36.2 °C) (05/26/19 1212)  Pulse: 76 (05/26/19 1212)  Resp: 16 (05/26/19 1212)  BP: 117/71 (05/26/19 1212)  SpO2: 97 % (05/26/19 1123) Vital Signs (24h Range):  Temp:  [96 °F (35.6 °C)-99.1 °F (37.3 °C)] 97.2 °F (36.2 °C)  Pulse:  [] 76  Resp:  [16-20] 16  SpO2:  [94 %-98 %] 97 %  BP: (117-142)/(71-98) 117/71     Weight: 67.5 kg (148 lb 13 oz)  Body mass index is 24.02 kg/m².    SpO2: 97 %  O2 Device (Oxygen Therapy): room air      Intake/Output Summary (Last 24 hours)  at 5/26/2019 1328  Last data filed at 5/26/2019 1200  Gross per 24 hour   Intake 860 ml   Output 3900 ml   Net -3040 ml       Lines/Drains/Airways     Peripheral Intravenous Line                 Peripheral IV - Single Lumen 05/25/19 1137 20 G Left Antecubital 1 day                Physical Exam   Constitutional: He is oriented to person, place, and time. He appears well-developed and well-nourished. He is not intubated.   HENT:   Head: Normocephalic and atraumatic.   Right Ear: External ear normal.   Left Ear: External ear normal.   Mouth/Throat: Oropharynx is clear and moist.   Eyes: Pupils are equal, round, and reactive to light. Conjunctivae and EOM are normal. Right eye exhibits no discharge. Left eye exhibits no discharge. No scleral icterus.   Neck: Normal range of motion. Neck supple. Normal carotid pulses, no hepatojugular reflux and no JVD present. Carotid bruit is not present. No tracheal deviation present. No thyromegaly present.   Cardiovascular: Normal rate, regular rhythm, S1 normal and S2 normal.  No extrasystoles are present. PMI is not displaced. Exam reveals no gallop, no S3, no distant heart sounds, no friction rub and no midsystolic click.   No murmur heard.  Pulses:       Carotid pulses are 2+ on the right side, and 2+ on the left side.       Radial pulses are 2+ on the right side, and 2+ on the left side.        Femoral pulses are 2+ on the right side, and 2+ on the left side.       Popliteal pulses are 2+ on the right side, and 2+ on the left side.        Dorsalis pedis pulses are 2+ on the right side, and 2+ on the left side.        Posterior tibial pulses are 2+ on the right side, and 2+ on the left side.   Pulmonary/Chest: Effort normal and breath sounds normal. No accessory muscle usage or stridor. No apnea, no tachypnea and no bradypnea. He is not intubated. No respiratory distress. He has no decreased breath sounds. He has no wheezes. He has no rales. He exhibits no tenderness and no bony  tenderness.   Abdominal: He exhibits no distension, no pulsatile liver, no abdominal bruit, no ascites, no pulsatile midline mass and no mass. There is no tenderness. There is no rebound and no guarding.   Musculoskeletal: Normal range of motion. He exhibits no edema or tenderness.   Lymphadenopathy:     He has no cervical adenopathy.   Neurological: He is alert and oriented to person, place, and time. He has normal reflexes. No cranial nerve deficit. Coordination normal.   Skin: Skin is warm. No rash noted. No erythema. No pallor.   Psychiatric: He has a normal mood and affect. His behavior is normal. Judgment and thought content normal.       Significant Labs:       LABS  CBC  Recent Labs   Lab 05/25/19 1137 05/26/19  0450   WBC 7.25 6.94   RBC 5.16 5.64   HGB 13.8* 15.0   HCT 42.9 47.2    198   MCV 83 84   MCH 26.7* 26.6*   MCHC 32.2 31.8*     BMP  Recent Labs   Lab 05/25/19 1137 05/26/19  0450    142   K 3.8 3.7   CO2 34* 34*    99   BUN 19 20   CREATININE 1.28 1.4   GLU 99 94       POCT-Glucose  No results found for: POCTGLUCOSE    Recent Labs   Lab 05/25/19 1137 05/26/19  0450   CALCIUM 8.6* 9.1   MG  --  1.8     LFT  Recent Labs   Lab 05/25/19 1137 05/26/19  0450   PROT 5.5* 5.9*   ALBUMIN 3.0* 3.1*   BILITOT 0.5 0.8   AST 33 23   ALKPHOS 112 83   ALT 63* 47*     GFR     COAGS  Recent Labs   Lab 05/25/19  1137   INR 1.1     CE  Recent Labs   Lab 05/25/19  1137   TROPONINI <0.012         Significant Imaging:       Imaging Results          CTA Chest Non-Coronary (PE Study) (Final result)  Result time 05/25/19 13:52:06    Final result by Dominguez Valencia III, MD (05/25/19 13:52:06)                 Impression:      No evidence of pulmonary embolism.    Mild cardiomegaly with possible findings of right heart failure and minimal interstitial edema.      Electronically signed by: Dominguez Valencia MD  Date:    05/25/2019  Time:    13:52             Narrative:    EXAMINATION:  CTA CHEST NON  CORONARY    CLINICAL HISTORY:  Dyspnea, cardiac origin suspected;    TECHNIQUE:  Routine CT angiogram of the chest protocol performed after the IV administration of 100 mL Omnipaque 350. 3D reconstructions/reformats/MIPs obtained. All CT scans at this facility are performed  using dose modulation techniques as appropriate to performed exam including the following:  automated exposure control; adjustment of mA and/or kV according to the patients size (this includes techniques or standardized protocols for targeted exams where dose is matched to indication/reason for exam: i.e. extremities or head);  iterative reconstruction technique.    COMPARISON:  None    FINDINGS:  There is satisfactory opacification of the pulmonary arteries. There is no evidence of pulmonary embolism.  No aortic aneurysm or dissection is identified.  There is mild cardiomegaly.  There is reflux of intravenous contrast from the right atrium into the IVC and hepatic veins which can be seen with right heart failure.  There is no pericardial effusion.  No pathologically enlarged lymph nodes are seen in the chest.  There is mild bibasilar dependent atelectasis.  There is mild bilateral peribronchial thickening suggesting inflammatory airway disease or mild interstitial edema.  There is minimal interlobular septal thickening in the lung bases which suggests mild interstitial edema.  There is mild right apical pleural scarring.  No acute appearing alveolar infiltrate, pleural effusion or pneumothorax identified.   no acute osseous abnormality is seen. Limited images through the upper abdomen demonstrate no acute findings.                               X-Ray Chest AP Portable (Final result)  Result time 05/25/19 11:44:49    Final result by Dominguez Valencia III, MD (05/25/19 11:44:49)                 Impression:      Cardiomegaly without evidence of pulmonary edema.      Electronically signed by: Dominguez Valencia MD  Date:    05/25/2019  Time:    11:44              Narrative:    EXAMINATION:  XR CHEST AP PORTABLE    CLINICAL HISTORY:  CHF;    COMPARISON:  01/08/2015    FINDINGS:  There is mild cardiomegaly without evidence of pulmonary edema.  The lungs appear clear of active disease. No acute appearing infiltrate, pleural effusion or pneumothorax identified.                                  Assessment and Plan:     * Acute congestive heart failure      Responded to diuresis  Will obtain echo  Dietary education        Agree with excellent management that initiated thus far     Acei   Beta-blocker       Obtain lipid panel  HgA1c          Further recommendations to follow               VTE Risk Mitigation (From admission, onward)        Ordered     enoxaparin injection 40 mg  Daily      05/25/19 1606     IP VTE HIGH RISK PATIENT  Once      05/25/19 1606          ECG: NSR with LVH + repolarization      Thank you for your consult.     Nikunj Pabon MD  Cardiology   [facility]

## 2019-05-26 NOTE — PLAN OF CARE
VN cued into pt's room for introduction. VN informed pt and fiance that VN would be working along side bedside nurse and PCT throughout shift. Level of present pain assessed. At present no distress noted. Discussed thoroughly with patient and fiance today's plan of care. Also discussed with patient and fiance high fall risk protocol and interventions that have been initiated and cont be in place for safety. Patient and fiance verbalized clear understanding and cooperation using teach back method. Bed alarm presently activated and in use. Will cont to be available to patient and intervene prn.

## 2019-05-26 NOTE — PROGRESS NOTES
"[facility] Medicine Progress Note    Primary Team: [facility] Hospitalist Team B  Attending Physician: Jordin Kincaid MD  Resident: Elmer  Intern: Leoncio    Subjective:      Patient feels well this morning. LE swelling is much improved. Urinated 4.3L since admission. Denies chest discomfort, palpitations, SOB, dizziness, F/C, nausea or change in BM.      Objective:     Last 24 Hour Vital Signs:  BP  Min: 127/87  Max: 142/92  Temp  Av.6 °F (36.4 °C)  Min: 96 °F (35.6 °C)  Max: 99.1 °F (37.3 °C)  Pulse  Av.8  Min: 80  Max: 105  Resp  Av.7  Min: 16  Max: 22  SpO2  Av.5 %  Min: 94 %  Max: 98 %  Height  Av' 6" (167.6 cm)  Min: 5' 6" (167.6 cm)  Max: 5' 6" (167.6 cm)  Weight  Av.5 kg (150 lb 15.5 oz)  Min: 67.5 kg (148 lb 13 oz)  Max: 69.9 kg (154 lb 1.6 oz)  I/O last 3 completed shifts:  In: -   Out: 4375 [Urine:4375]    Physical Examination:  General: no acute distress, calm, pleasant, appears stated age  Head: normocephalic, atraumatic   Eyes: EOMI, PERRL, anicteric sclera, clear conjunctiva  Ears, Nose, Throat: MMM, OP without erythema or exudate   Neck: supple, full ROM without pain or stiffness, trachea midline, no thyromegaly, no carotid bruits, JVP 8 cm  Cardiovascular: RRR, audible S1/S2 without murmurs or rubs, S3 present; radial, DP, and PT pulses 2+ and symmetric, capillary refill time <2s  Pulmonary: normal work of breathing on RA without accessory muscle use, symmetric chest rise, diffuse inspiratory wheeze, no crackles or rhonchi appreciated  Abdomen: soft, non-tender, non-distended, BS+  MSKL: full passive and active ROM  Lymphatic: no LAD appreciated  Skin: 1+ pitting edema to mid-shin bilaterally (improved)  Neurologic: A&Ox4, moving extremities spontaneously, SILT throughout, 5/5 strength in BUE/BLE    Laboratory:  Laboratory Data Reviewed: yes  Pertinent Findings:  Lab Results   Component Value Date    WBC 6.94 2019    HGB 15.0 2019    HCT 47.2 2019    MCV " 84 05/26/2019     05/26/2019     Lab Results   Component Value Date    CREATININE 1.4 05/26/2019    BUN 20 05/26/2019     05/26/2019    K 3.7 05/26/2019    CL 99 05/26/2019    CO2 34 (H) 05/26/2019     Microbiology Data Reviewed: yes  Pertinent Findings:  None    Other Results:  EKG (my interpretation):   No new tracings    Radiology Data Reviewed: yes  Pertinent Findings:  Imaging Results          CTA Chest Non-Coronary (PE Study) (Final result)  Result time 05/25/19 13:52:06    Final result by Dominguez Valencia III, MD (05/25/19 13:52:06)                 Impression:      No evidence of pulmonary embolism.    Mild cardiomegaly with possible findings of right heart failure and minimal interstitial edema.      Electronically signed by: Dominguez Valencia MD  Date:    05/25/2019  Time:    13:52             Narrative:    EXAMINATION:  CTA CHEST NON CORONARY    CLINICAL HISTORY:  Dyspnea, cardiac origin suspected;    TECHNIQUE:  Routine CT angiogram of the chest protocol performed after the IV administration of 100 mL Omnipaque 350. 3D reconstructions/reformats/MIPs obtained. All CT scans at this facility are performed  using dose modulation techniques as appropriate to performed exam including the following:  automated exposure control; adjustment of mA and/or kV according to the patients size (this includes techniques or standardized protocols for targeted exams where dose is matched to indication/reason for exam: i.e. extremities or head);  iterative reconstruction technique.    COMPARISON:  None    FINDINGS:  There is satisfactory opacification of the pulmonary arteries. There is no evidence of pulmonary embolism.  No aortic aneurysm or dissection is identified.  There is mild cardiomegaly.  There is reflux of intravenous contrast from the right atrium into the IVC and hepatic veins which can be seen with right heart failure.  There is no pericardial effusion.  No pathologically enlarged lymph nodes are seen  in the chest.  There is mild bibasilar dependent atelectasis.  There is mild bilateral peribronchial thickening suggesting inflammatory airway disease or mild interstitial edema.  There is minimal interlobular septal thickening in the lung bases which suggests mild interstitial edema.  There is mild right apical pleural scarring.  No acute appearing alveolar infiltrate, pleural effusion or pneumothorax identified.   no acute osseous abnormality is seen. Limited images through the upper abdomen demonstrate no acute findings.                               X-Ray Chest AP Portable (Final result)  Result time 05/25/19 11:44:49    Final result by Dominguez Valencia III, MD (05/25/19 11:44:49)                 Impression:      Cardiomegaly without evidence of pulmonary edema.      Electronically signed by: Dominguez Valencia MD  Date:    05/25/2019  Time:    11:44             Narrative:    EXAMINATION:  XR CHEST AP PORTABLE    CLINICAL HISTORY:  CHF;    COMPARISON:  01/08/2015    FINDINGS:  There is mild cardiomegaly without evidence of pulmonary edema.  The lungs appear clear of active disease. No acute appearing infiltrate, pleural effusion or pneumothorax identified.                              Current Medications:     Infusions:       Scheduled:   carvedilol  3.125 mg Oral BID    enoxaparin  40 mg Subcutaneous Daily    fluticasone furoate-vilanterol  1 puff Inhalation Daily    furosemide  20 mg Oral BID    lisinopril  20 mg Oral Daily        PRN:  albuterol, sodium chloride 0.9%    Antibiotics and Day Number of Therapy:  None    Lines and Day Number of Therapy:  PIV    Assessment:     Dima Quintanilla is a 63 year old male, with a PMHx of asthma, who presented to [facility] complaining of 1 week of LE swelling. He has been admitted for IV diuresis for suspected acute right sided heart failure.      Plan:     Volume overload 2/2 to suspected right-sided heart failure vs left  Patient presented to [facility] complaining of 1 week of  LE edema along with worsening LEMON attributed to asthma exacerbation. On arrival, afebrile and vitals stable with SpO2 97% on RA.   - 2+ pitting edema in bilateral LE, lung ascultation without crackles, JVP slightly elevated after >2L diuresis at [facility]  - NT-proBNP: 4680, CXR with mild cardiomegaly but no pulmonary edema appreciated  - ECG: NSR with interventricular block, LVH, and OLMAN, troponin <0.012, D-Dimer 254  - CTA chest: without evidence of PE but showing reflux of IV contrast from the RA into IVC and hepatic veins which can be seen with RH failure.   - received lasix 80 mg IV,  mg in [facility] ED  - admit to floor with cardiac monitoring  - Formal ECHO ordered  - given 1 x dose of IV lasix 40 mg with good response  - starting lasix 20 mg BID today  - carvedilol 3.125 added today  - cardiology consulted, appreciate recommendations     Asthma  Patient has documented asthma, confirmed with PFTs in 2015, whom has had previous hospitalizations for exacerbations requiring intubation (most recently 2015). Asthma likely responsible or contributory to patient's SOB for past several days.  - Home meds include: symbicort with rescue albuterol and nebulizer. Patient does not use symbicort daily as instructed but PRN during exacerbations  - fluticasone-vilanterol 1 puff daily  - albuterol inhaler PRN  - stable on RA     Transaminitis, mild and improving  AST 33, ALT 63, ALP and tbili WNL  - no recent labs for comparison  - denies heavy EtOH use  - likely 2/2 to congestion for presumed RHF  - ALT 47 today  - continue to monitor     Metabolic alkalosis  HCO3 34 on admit  - patient carries asthma/COPD diagnosis   - could potentially be compensation from chronic CO2 retention   - monitor     Elevated BP  Patient reports being prescribed lisinopril in the past after ED visits but does not take it. BP slightly elevated on admit.  - will resume lisinopril 20 mg daily  - adding carvedilol 3.125 today     HCM  - not UTD on  vaccinations     Code: full  F: none  E: replete PRN  N: cardiac  PPX: lovenox     Dispo: pending formal ECHO and continued diuresis.    Jluis Fang MD  [facility] Internal Medicine [id number]    [facility] Medicine Hospitalist Pager numbers:   [facility] Hospitalist Medicine Team A (Yvonne/Bonny): [phone]  [facility] Hospitalist Medicine Team B (Sanjiv/Holly):  [phone]

## 2019-05-27 LAB
ALBUMIN SERPL BCP-MCNC: 2.6 G/DL (ref 3.5–5.2)
ALP SERPL-CCNC: 69 U/L (ref 55–135)
ALT SERPL W/O P-5'-P-CCNC: 32 U/L (ref 10–44)
ANION GAP SERPL CALC-SCNC: 8 MMOL/L (ref 8–16)
AORTIC ROOT ANNULUS: 2.83 CM
AORTIC VALVE CUSP SEPERATION: 1.4 CM
AST SERPL-CCNC: 17 U/L (ref 10–40)
AV INDEX (PROSTH): 0.75
AV MEAN GRADIENT: 1.13 MMHG
AV PEAK GRADIENT: 1.9 MMHG
AV VALVE AREA: 2 CM2
AV VELOCITY RATIO: 0.73
BILIRUB SERPL-MCNC: 0.5 MG/DL (ref 0.1–1)
BSA FOR ECHO PROCEDURE: 1.8 M2
BUN SERPL-MCNC: 18 MG/DL (ref 8–23)
CALCIUM SERPL-MCNC: 8.6 MG/DL (ref 8.7–10.5)
CHLORIDE SERPL-SCNC: 103 MMOL/L (ref 95–110)
CHOLEST SERPL-MCNC: 178 MG/DL (ref 120–199)
CHOLEST/HDLC SERPL: 3.4 {RATIO} (ref 2–5)
CO2 SERPL-SCNC: 29 MMOL/L (ref 23–29)
CREAT SERPL-MCNC: 1.3 MG/DL (ref 0.5–1.4)
CV ECHO LV RWT: 0.22 CM
DOP CALC AO PEAK VEL: 0.69 M/S
DOP CALC AO VTI: 10.64 CM
DOP CALC LVOT AREA: 2.66 CM2
DOP CALC LVOT DIAMETER: 1.84 CM
DOP CALC LVOT PEAK VEL: 0.51 M/S
DOP CALC LVOT STROKE VOLUME: 21.26 CM3
DOP CALCLVOT PEAK VEL VTI: 8 CM
E WAVE DECELERATION TIME: 84.91 MSEC
E/A RATIO: 18
ECHO LV POSTERIOR WALL: 0.7 CM (ref 0.6–1.1)
EST. GFR  (AFRICAN AMERICAN): >60 ML/MIN/1.73 M^2
EST. GFR  (NON AFRICAN AMERICAN): 58 ML/MIN/1.73 M^2
ESTIMATED AVG GLUCOSE: 123 MG/DL (ref 68–131)
FRACTIONAL SHORTENING: 8 % (ref 28–44)
GLUCOSE SERPL-MCNC: 96 MG/DL (ref 70–110)
HBA1C MFR BLD HPLC: 5.9 % (ref 4–5.6)
HDLC SERPL-MCNC: 53 MG/DL (ref 40–75)
HDLC SERPL: 29.8 % (ref 20–50)
INTERVENTRICULAR SEPTUM: 0.7 CM (ref 0.6–1.1)
LA MAJOR: 5.16 CM
LA MINOR: 5.55 CM
LA WIDTH: 4.19 CM
LDLC SERPL CALC-MCNC: 103.4 MG/DL (ref 63–159)
LEFT ATRIUM SIZE: 4.37 CM
LEFT ATRIUM VOLUME INDEX: 46.9 ML/M2
LEFT ATRIUM VOLUME: 83.23 CM3
LEFT INTERNAL DIMENSION IN SYSTOLE: 6 CM (ref 2.1–4)
LEFT VENTRICLE DIASTOLIC VOLUME INDEX: 89.23 ML/M2
LEFT VENTRICLE DIASTOLIC VOLUME: 158.49 ML
LEFT VENTRICLE MASS INDEX: 102.7 G/M2
LEFT VENTRICLE SYSTOLIC VOLUME INDEX: 76.9 ML/M2
LEFT VENTRICLE SYSTOLIC VOLUME: 136.67 ML
LEFT VENTRICULAR INTERNAL DIMENSION IN DIASTOLE: 6.5 CM (ref 3.5–6)
LEFT VENTRICULAR MASS: 182.32 G
MV PEAK A VEL: 0.05 M/S
MV PEAK E VEL: 0.9 M/S
NONHDLC SERPL-MCNC: 125 MG/DL
PISA TR MAX VEL: 2.43 M/S
POTASSIUM SERPL-SCNC: 3.8 MMOL/L (ref 3.5–5.1)
PROT SERPL-MCNC: 5 G/DL (ref 6–8.4)
RA MAJOR: 4.74 CM
RA PRESSURE: 3 MMHG
RIGHT VENTRICULAR END-DIASTOLIC DIMENSION: 2.78 CM
SODIUM SERPL-SCNC: 140 MMOL/L (ref 136–145)
TR MAX PG: 23.62 MMHG
TRICUSPID ANNULAR PLANE SYSTOLIC EXCURSION: 1.6 CM
TRIGL SERPL-MCNC: 108 MG/DL (ref 30–150)
TV REST PULMONARY ARTERY PRESSURE: 27 MMHG

## 2019-05-27 PROCEDURE — 94640 AIRWAY INHALATION TREATMENT: CPT

## 2019-05-27 PROCEDURE — 25000003 PHARM REV CODE 250: Performed by: STUDENT IN AN ORGANIZED HEALTH CARE EDUCATION/TRAINING PROGRAM

## 2019-05-27 PROCEDURE — 25000242 PHARM REV CODE 250 ALT 637 W/ HCPCS: Performed by: STUDENT IN AN ORGANIZED HEALTH CARE EDUCATION/TRAINING PROGRAM

## 2019-05-27 PROCEDURE — 80061 LIPID PANEL: CPT

## 2019-05-27 PROCEDURE — 99233 SBSQ HOSP IP/OBS HIGH 50: CPT | Mod: ,,, | Performed by: INTERNAL MEDICINE

## 2019-05-27 PROCEDURE — 83036 HEMOGLOBIN GLYCOSYLATED A1C: CPT

## 2019-05-27 PROCEDURE — 11000001 HC ACUTE MED/SURG PRIVATE ROOM

## 2019-05-27 PROCEDURE — 63600175 PHARM REV CODE 636 W HCPCS: Performed by: FAMILY MEDICINE

## 2019-05-27 PROCEDURE — 80053 COMPREHEN METABOLIC PANEL: CPT

## 2019-05-27 PROCEDURE — 25000003 PHARM REV CODE 250: Performed by: NURSE PRACTITIONER

## 2019-05-27 PROCEDURE — 36415 COLL VENOUS BLD VENIPUNCTURE: CPT

## 2019-05-27 PROCEDURE — 94761 N-INVAS EAR/PLS OXIMETRY MLT: CPT

## 2019-05-27 PROCEDURE — 99233 PR SUBSEQUENT HOSPITAL CARE,LEVL III: ICD-10-PCS | Mod: ,,, | Performed by: INTERNAL MEDICINE

## 2019-05-27 RX ORDER — CLOPIDOGREL BISULFATE 75 MG/1
300 TABLET ORAL ONCE
Status: COMPLETED | OUTPATIENT
Start: 2019-05-27 | End: 2019-05-27

## 2019-05-27 RX ORDER — CLOPIDOGREL BISULFATE 75 MG/1
75 TABLET ORAL DAILY
Status: DISCONTINUED | OUTPATIENT
Start: 2019-05-28 | End: 2019-05-29 | Stop reason: HOSPADM

## 2019-05-27 RX ORDER — FUROSEMIDE 40 MG/1
40 TABLET ORAL DAILY
Status: DISCONTINUED | OUTPATIENT
Start: 2019-05-27 | End: 2019-05-28

## 2019-05-27 RX ORDER — ASPIRIN 81 MG/1
81 TABLET ORAL DAILY
Status: DISCONTINUED | OUTPATIENT
Start: 2019-05-28 | End: 2019-05-29 | Stop reason: HOSPADM

## 2019-05-27 RX ORDER — SODIUM CHLORIDE 9 MG/ML
3 INJECTION, SOLUTION INTRAVENOUS CONTINUOUS
Status: ACTIVE | OUTPATIENT
Start: 2019-05-28 | End: 2019-05-28

## 2019-05-27 RX ORDER — ASPIRIN 325 MG
325 TABLET ORAL ONCE
Status: COMPLETED | OUTPATIENT
Start: 2019-05-27 | End: 2019-05-27

## 2019-05-27 RX ADMIN — ALBUTEROL SULFATE 2 PUFF: 90 AEROSOL, METERED RESPIRATORY (INHALATION) at 08:05

## 2019-05-27 RX ADMIN — ENOXAPARIN SODIUM 40 MG: 100 INJECTION SUBCUTANEOUS at 05:05

## 2019-05-27 RX ADMIN — CARVEDILOL 3.12 MG: 3.12 TABLET, FILM COATED ORAL at 09:05

## 2019-05-27 RX ADMIN — FLUTICASONE FUROATE AND VILANTEROL TRIFENATATE 1 PUFF: 100; 25 POWDER RESPIRATORY (INHALATION) at 08:05

## 2019-05-27 RX ADMIN — CLOPIDOGREL BISULFATE 300 MG: 75 TABLET ORAL at 05:05

## 2019-05-27 RX ADMIN — ASPIRIN 325 MG ORAL TABLET 325 MG: 325 PILL ORAL at 05:05

## 2019-05-27 RX ADMIN — FLUTICASONE PROPIONATE 100 MCG: 50 SPRAY, METERED NASAL at 09:05

## 2019-05-27 RX ADMIN — FUROSEMIDE 40 MG: 40 TABLET ORAL at 09:05

## 2019-05-27 RX ADMIN — LISINOPRIL 20 MG: 20 TABLET ORAL at 09:05

## 2019-05-27 NOTE — PLAN OF CARE
VN rounds: VN cued into pt's room with pt's permission. Pt resting in bed. Fall risk protocol discussed with pt. VN instructed pt to call for assistance. Vn informed pt of TTE today. Pt aware and agreeable. NAD noted. Allowed time for questions. Will cont to be available and intervene as needed.        05/27/19 0903   Type of Frequent Check   Type Patient Rounds;Other (see comments)  (VN rounds)   Safety/Activity   Patient Rounds visualized patient;call light in patient/parent reach   Safety Promotion/Fall Prevention Fall Risk reviewed with patient/family;assistive device/personal item within reach;instructed to call staff for mobility   Positioning   Body Position sitting up in bed   Pain/Comfort/Sleep   Preferred Pain Scale word (verbal rating pain scale)   Pain Rating: Rest 0 - no pain   Sleep/Rest/Relaxation awake;no problem identified   Assessments (Pre/Post)   Level of Consciousness (AVPU) alert

## 2019-05-27 NOTE — ASSESSMENT & PLAN NOTE
-aggressively diuresed with IV Lasix with transition to oral Lasix  -negative 4.1 liters since admission  -on BB, ACEI and Lasix BID  -echo with severely depressed LV function with EF 10%; concerned about ischemic etiology therefore [facility] recommended for further evaluaton   -further recs once [facility] completed

## 2019-05-27 NOTE — PLAN OF CARE
Problem: Adult Inpatient Plan of Care  Goal: Plan of Care Review  Outcome: Ongoing (interventions implemented as appropriate)  Patient safety maintained. Medications given as ordered. Diuresis continued, monitoring I/O.  Echocardiogram pending tomorrow. Assistance provided as needed.

## 2019-05-27 NOTE — CONSULTS
Food & Nutrition  Education    Diet Education: CHF  Time Spent: 15 minutes  Learners: Wife      Nutrition Education provided with handouts:   Cardiac-TLC Nutrition Therapy    Comments:  Wife reports she cooks a low salt diet at home.  All questions and concerns answered. Dietitian's contact information provided.       Follow-Up:  6/3/2019  Please Re-consult as needed      Thanks!  Saúl Espitia RDN

## 2019-05-27 NOTE — PROGRESS NOTES
[facility] Medicine Progress Note    Primary Team: [facility] Hospitalist Team B  Attending Physician: Jordin Kincaid MD  Resident: Elmer  Intern: Leoncio    Subjective:     Patient feels well today. LE swelling improved back to baseline. Denies difficulty breathing, CP, palpitations.     Objective:     Last 24 Hour Vital Signs:  BP  Min: 106/66  Max: 127/82  Temp  Av.3 °F (36.3 °C)  Min: 96.9 °F (36.1 °C)  Max: 98 °F (36.7 °C)  Pulse  Av.5  Min: 71  Max: 89  Resp  Av.9  Min: 16  Max: 20  SpO2  Av.9 %  Min: 94 %  Max: 98 %  Weight  Av.6 kg (151 lb 3.8 oz)  Min: 68.6 kg (151 lb 3.8 oz)  Max: 68.6 kg (151 lb 3.8 oz)  I/O last 3 completed shifts:  In: 1210 [P.O.:1210]  Out: 1800 [Urine:1800]    Physical Examination:  General: no acute distress, calm, pleasant, appears stated age  Head: normocephalic, atraumatic   Eyes: EOMI, PERRL, anicteric sclera, clear conjunctiva  Ears, Nose, Throat: MMM, OP without erythema or exudate   Neck: supple, full ROM without pain or stiffness, trachea midline, no thyromegaly, no carotid bruits, JVP 8 cm  Cardiovascular: RRR, audible S1/S2 without murmurs or rubs, S3 present; radial, DP, and PT pulses 2+ and symmetric, capillary refill time <2s  Pulmonary: normal work of breathing on RA without accessory muscle use, symmetric chest rise, diffuse inspiratory wheeze, no crackles or rhonchi appreciated  Abdomen: soft, non-tender, non-distended, BS+  MSKL: full passive and active ROM  Lymphatic: no LAD appreciated  Skin: 1+ pitting edema to mid-shin bilaterally (improved)  Neurologic: A&Ox4, moving extremities spontaneously, SILT throughout, 5/5 strength in BUE/BLE    Laboratory:  Laboratory Data Reviewed: yes  Pertinent Findings:  Lab Results   Component Value Date    WBC 6.94 2019    HGB 15.0 2019    HCT 47.2 2019    MCV 84 2019     2019     Lab Results   Component Value Date    CREATININE 1.3 2019    BUN 18 2019    NA  140 05/27/2019    K 3.8 05/27/2019     05/27/2019    CO2 29 05/27/2019     Microbiology Data Reviewed: yes  Pertinent Findings:  None    Other Results:  EKG (my interpretation):   No new tracings    Radiology Data Reviewed: yes  Pertinent Findings:  Imaging Results          CTA Chest Non-Coronary (PE Study) (Final result)  Result time 05/25/19 13:52:06    Final result by Dominguez Valencia III, MD (05/25/19 13:52:06)                 Impression:      No evidence of pulmonary embolism.    Mild cardiomegaly with possible findings of right heart failure and minimal interstitial edema.      Electronically signed by: Dominguez Valencia MD  Date:    05/25/2019  Time:    13:52             Narrative:    EXAMINATION:  CTA CHEST NON CORONARY    CLINICAL HISTORY:  Dyspnea, cardiac origin suspected;    TECHNIQUE:  Routine CT angiogram of the chest protocol performed after the IV administration of 100 mL Omnipaque 350. 3D reconstructions/reformats/MIPs obtained. All CT scans at this facility are performed  using dose modulation techniques as appropriate to performed exam including the following:  automated exposure control; adjustment of mA and/or kV according to the patients size (this includes techniques or standardized protocols for targeted exams where dose is matched to indication/reason for exam: i.e. extremities or head);  iterative reconstruction technique.    COMPARISON:  None    FINDINGS:  There is satisfactory opacification of the pulmonary arteries. There is no evidence of pulmonary embolism.  No aortic aneurysm or dissection is identified.  There is mild cardiomegaly.  There is reflux of intravenous contrast from the right atrium into the IVC and hepatic veins which can be seen with right heart failure.  There is no pericardial effusion.  No pathologically enlarged lymph nodes are seen in the chest.  There is mild bibasilar dependent atelectasis.  There is mild bilateral peribronchial thickening suggesting  inflammatory airway disease or mild interstitial edema.  There is minimal interlobular septal thickening in the lung bases which suggests mild interstitial edema.  There is mild right apical pleural scarring.  No acute appearing alveolar infiltrate, pleural effusion or pneumothorax identified.   no acute osseous abnormality is seen. Limited images through the upper abdomen demonstrate no acute findings.                               X-Ray Chest AP Portable (Final result)  Result time 05/25/19 11:44:49    Final result by Dominguez Valencia III, MD (05/25/19 11:44:49)                 Impression:      Cardiomegaly without evidence of pulmonary edema.      Electronically signed by: Dominguez Valencia MD  Date:    05/25/2019  Time:    11:44             Narrative:    EXAMINATION:  XR CHEST AP PORTABLE    CLINICAL HISTORY:  CHF;    COMPARISON:  01/08/2015    FINDINGS:  There is mild cardiomegaly without evidence of pulmonary edema.  The lungs appear clear of active disease. No acute appearing infiltrate, pleural effusion or pneumothorax identified.                              Current Medications:     Infusions:       Scheduled:   carvedilol  3.125 mg Oral BID    enoxaparin  40 mg Subcutaneous Daily    fluticasone furoate-vilanterol  1 puff Inhalation Daily    fluticasone propionate  2 spray Each Nare Daily    furosemide  20 mg Oral BID    lisinopril  20 mg Oral Daily        PRN:  albuterol, sodium chloride 0.9%    Antibiotics and Day Number of Therapy:  None    Lines and Day Number of Therapy:  PIV    Assessment:     Dima Quintanilla is a 63 year old male, with a PMHx of asthma, who presented to [facility] complaining of 1 week of LE swelling. He has been admitted for IV diuresis for suspected acute right sided heart failure.      Plan:     Volume overload 2/2 to suspected right-sided heart failure vs left  Patient presented to [facility] complaining of 1 week of LE edema along with worsening LEMON attributed to asthma exacerbation. On  arrival, afebrile and vitals stable with SpO2 97% on RA.   - 2+ pitting edema in bilateral LE, lung ascultation without crackles, JVP slightly elevated after >2L diuresis at [facility]  - NT-proBNP: 4680, CXR with mild cardiomegaly but no pulmonary edema appreciated  - ECG: NSR with interventricular block, LVH, and OLMAN, troponin <0.012, D-Dimer 254  - CTA chest: without evidence of PE but showing reflux of IV contrast from the RA into IVC and hepatic veins which can be seen with RH failure.   - received lasix 80 mg IV,  mg in [facility] ED  - admit to floor with cardiac monitoring  - Formal ECHO today  - given 1 x dose of IV lasix 40 mg with good response  - starting lasix 20 mg BID today  - carvedilol 3.125 added today  - cardiology consulted, will follow up echo     Asthma  Patient has documented asthma, confirmed with PFTs in 2015, whom has had previous hospitalizations for exacerbations requiring intubation (most recently 2015). Asthma likely responsible or contributory to patient's SOB for past several days.  - Home meds include: symbicort with rescue albuterol and nebulizer. Patient does not use symbicort daily as instructed but PRN during exacerbations  - fluticasone-vilanterol 1 puff daily  - albuterol inhaler PRN  - stable on RA     Transaminitis, mild and improving  AST 33, ALT 63, ALP and tbili WNL  - no recent labs for comparison  - denies heavy EtOH use  - likely 2/2 to congestion for presumed RHF  - continue to monitor     Metabolic alkalosis  HCO3 34 on admit  - patient carries asthma/COPD diagnosis   - could potentially be compensation from chronic CO2 retention   - monitor     Elevated BP  Patient reports being prescribed lisinopril in the past after ED visits but does not take it. BP slightly elevated on admit.  - will resume lisinopril 20 mg daily  - adding carvedilol 3.125 today     HCM  - not UTD on vaccinations     Code: full  F: none  E: replete PRN  N: cardiac  PPX: lovenox     Dispo: pending formal  ECHO and continued diuresis.    Jluis Fang MD  [facility] Internal Medicine HO-[id number]    [facility] Medicine Hospitalist Pager numbers:   [facility] Hospitalist Medicine Team A (Yvonne/Bonny): [phone]  [facility] Hospitalist Medicine Team B (Sanjiv/Holly):  [phone]

## 2019-05-27 NOTE — PROGRESS NOTES
[facility]  Cardiology  Progress Note    Patient Name: Dima Quintanilla  MRN: [id number]  Admission Date: 5/25/2019  Hospital Length of Stay: 0 days  Code Status: Full Code   Attending Physician: Jordin Kincaid MD   Primary Care Physician: Sweetie Riojas MD  Expected Discharge Date:   Principal Problem:Acute congestive heart failure    Subjective:     Hospital Course:   5/25/2019-5/26/2019 Presented with SOB and LE edema. Aggressively diuresed with IV Lasix with good response. Transitioned to oral Lasix. Concerned about CHF-systolic etiology therefore echocardiogram orderd  5/27/2019 On Coreg BID along with daily Lisinopril with transition to oral Lasix BID. 650cc out overnight and negative 4.1 liters since admission. Echocardiogram today with following results:   · Severely decreased left ventricular systolic function. The estimated ejection fraction is 10%  · Grade III (severe) left ventricular diastolic dysfunction consistent with restrictive physiology.  · Moderate left atrial enlargement.  · Moderate mitral regurgitation.  · Septal wall has abnormal motion consistent with left bundle branch block.  · Severe global hypokinetic wall motion.  · Low normal right ventricular systolic function.  · Normal central venous pressure (3 mm Hg).  · The estimated PA systolic pressure is 27 mm Hg     Discussion in regards to concern of severely depressed LVEF and recommend [facility] for further evaluation-will plan to proceed tomorrow           Review of Systems   Constitution: Negative for chills, decreased appetite, diaphoresis and fever.   Cardiovascular: Negative for chest pain, claudication, cyanosis, dyspnea on exertion, irregular heartbeat, leg swelling, near-syncope, orthopnea, palpitations, paroxysmal nocturnal dyspnea and syncope.   Respiratory: Negative for cough, hemoptysis, shortness of breath and wheezing.    Gastrointestinal: Negative for bloating, abdominal pain, constipation, diarrhea, melena, nausea  and vomiting.   Neurological: Negative for dizziness and weakness.     Objective:     Vital Signs (Most Recent):  Temp: 96.7 °F (35.9 °C) (05/27/19 1144)  Pulse: 82 (05/27/19 1144)  Resp: 17 (05/27/19 1144)  BP: 122/66 (05/27/19 1144)  SpO2: 96 % (05/27/19 1209) Vital Signs (24h Range):  Temp:  [96.7 °F (35.9 °C)-98 °F (36.7 °C)] 96.7 °F (35.9 °C)  Pulse:  [71-89] 82  Resp:  [16-20] 17  SpO2:  [94 %-98 %] 96 %  BP: (106-127)/(65-82) 122/66     Weight: 68.6 kg (151 lb 3.8 oz)  Body mass index is 24.41 kg/m².     SpO2: 96 %  O2 Device (Oxygen Therapy): room air      Intake/Output Summary (Last 24 hours) at 5/27/2019 1524  Last data filed at 5/27/2019 1200  Gross per 24 hour   Intake 710 ml   Output 900 ml   Net -190 ml       Lines/Drains/Airways     Peripheral Intravenous Line                 Peripheral IV - Single Lumen 05/25/19 1137 20 G Left Antecubital 2 days                Physical Exam   Constitutional: He is oriented to person, place, and time. He appears well-developed and well-nourished. No distress.   Cardiovascular: Normal rate and regular rhythm. Exam reveals no gallop.   No murmur heard.  Pulmonary/Chest: Effort normal and breath sounds normal. No respiratory distress. He has no wheezes.   Abdominal: Soft. Bowel sounds are normal. He exhibits no distension. There is no tenderness.   Neurological: He is alert and oriented to person, place, and time.   Skin: Skin is warm and dry.       Significant Labs:     Recent Labs   Lab 05/27/19  0440      K 3.8      CO2 29   BUN 18   CREATININE 1.3       Significant Imaging:     TTE 5/27/2019    · Severely decreased left ventricular systolic function. The estimated ejection fraction is 10%  · Grade III (severe) left ventricular diastolic dysfunction consistent with restrictive physiology.  · Moderate left atrial enlargement.  · Moderate mitral regurgitation.  · Septal wall has abnormal motion consistent with left bundle branch block.  · Severe global  "hypokinetic wall motion.  · Low normal right ventricular systolic function.  · Normal central venous pressure (3 mm Hg).  · The estimated PA systolic pressure is 27 mm Hg    Assessment and Plan:     Brief HPI: Seen on AM NP rounds this morning and again on rounds this afternoon with Dr. Pabon. Discussed echocardiogram findings from this morning. Discussed need for LHC for further evaluation. Patient states "I was hoping you had better news for me". Agreeable with plan to proceed with LHC tomorrow     * Acute congestive heart failure  -aggressively diuresed with IV Lasix with transition to oral Lasix  -negative 4.1 liters since admission  -on BB, ACEI and Lasix BID  -echo with severely depressed LV function with EF 10%; concerned about ischemic etiology therefore LHC recommended for further evaluaton   -further recs once LHC completed         Edema  -improved with IV diuresis  -will continue Lasix BID         VTE Risk Mitigation (From admission, onward)        Ordered     enoxaparin injection 40 mg  Daily      05/25/19 1606     IP VTE HIGH RISK PATIENT  Once      05/25/19 1606          FERNANDA Soni, ANP  Cardiology  [facility]  "

## 2019-05-27 NOTE — SUBJECTIVE & OBJECTIVE
Review of Systems   Constitution: Negative for chills, decreased appetite, diaphoresis and fever.   Cardiovascular: Negative for chest pain, claudication, cyanosis, dyspnea on exertion, irregular heartbeat, leg swelling, near-syncope, orthopnea, palpitations, paroxysmal nocturnal dyspnea and syncope.   Respiratory: Negative for cough, hemoptysis, shortness of breath and wheezing.    Gastrointestinal: Negative for bloating, abdominal pain, constipation, diarrhea, melena, nausea and vomiting.   Neurological: Negative for dizziness and weakness.     Objective:     Vital Signs (Most Recent):  Temp: 96.7 °F (35.9 °C) (05/27/19 1144)  Pulse: 82 (05/27/19 1144)  Resp: 17 (05/27/19 1144)  BP: 122/66 (05/27/19 1144)  SpO2: 96 % (05/27/19 1209) Vital Signs (24h Range):  Temp:  [96.7 °F (35.9 °C)-98 °F (36.7 °C)] 96.7 °F (35.9 °C)  Pulse:  [71-89] 82  Resp:  [16-20] 17  SpO2:  [94 %-98 %] 96 %  BP: (106-127)/(65-82) 122/66     Weight: 68.6 kg (151 lb 3.8 oz)  Body mass index is 24.41 kg/m².     SpO2: 96 %  O2 Device (Oxygen Therapy): room air      Intake/Output Summary (Last 24 hours) at 5/27/2019 1524  Last data filed at 5/27/2019 1200  Gross per 24 hour   Intake 710 ml   Output 900 ml   Net -190 ml       Lines/Drains/Airways     Peripheral Intravenous Line                 Peripheral IV - Single Lumen 05/25/19 1137 20 G Left Antecubital 2 days                Physical Exam   Constitutional: He is oriented to person, place, and time. He appears well-developed and well-nourished. No distress.   Cardiovascular: Normal rate and regular rhythm. Exam reveals no gallop.   No murmur heard.  Pulmonary/Chest: Effort normal and breath sounds normal. No respiratory distress. He has no wheezes.   Abdominal: Soft. Bowel sounds are normal. He exhibits no distension. There is no tenderness.   Neurological: He is alert and oriented to person, place, and time.   Skin: Skin is warm and dry.       Significant Labs:     Recent Labs   Lab  05/27/19  0440      K 3.8      CO2 29   BUN 18   CREATININE 1.3       Significant Imaging:     TTE 5/27/2019    · Severely decreased left ventricular systolic function. The estimated ejection fraction is 10%  · Grade III (severe) left ventricular diastolic dysfunction consistent with restrictive physiology.  · Moderate left atrial enlargement.  · Moderate mitral regurgitation.  · Septal wall has abnormal motion consistent with left bundle branch block.  · Severe global hypokinetic wall motion.  · Low normal right ventricular systolic function.  · Normal central venous pressure (3 mm Hg).  · The estimated PA systolic pressure is 27 mm Hg

## 2019-05-27 NOTE — PROGRESS NOTES
.Pharmacy New Medication Education    Patient accepted medication education.    Pharmacy educated patient on name and purpose of medications and possible side effects, using the teach-back method.     Learners of pharmacy medication education included:  Patient    Patient +/- learner response:  Verbalized Understanding, Teachback

## 2019-05-27 NOTE — PLAN OF CARE
Discharge planning brochure provided. White board updated with CM name & contact info.  Pt encouraged to call with any questions or needs. CM will continue to follow patient throughout the transitions of care, and assist with any discharge needs.    Pt is independent with all ADL's, uses no assistive devices at home.  Uses CPAP and nebulizer at home from [facility].  Pt anticipates no needs at time of d/c.       05/27/19 0075   Discharge Assessment   Assessment Type Discharge Planning Assessment   Confirmed/corrected address and phone number on facesheet? Yes   Assessment information obtained from? Patient   Expected Length of Stay (days) 1   Communicated expected length of stay with patient/caregiver yes   Prior to hospitilization cognitive status: Alert/Oriented   Prior to hospitalization functional status: Independent   Current cognitive status: Alert/Oriented   Current Functional Status: Independent   Lives With spouse   Able to Return to Prior Arrangements yes   Is patient able to care for self after discharge? Yes   Readmission Within the Last 30 Days no previous admission in last 30 days   Patient currently being followed by outpatient case management? No   Patient currently receives any other outside agency services? No   Equipment Currently Used at Home nebulizer;CPAP   Do you have any problems affording any of your prescribed medications? No   Is the patient taking medications as prescribed? yes   Does the patient have transportation home? Yes   Transportation Anticipated family or friend will provide   Does the patient receive services at the [facility]? No   Discharge Plan A Home   Discharge Plan B Home with family   DME Needed Upon Discharge  none   Patient/Family in Agreement with Plan yes       Kandy Rodriguez RN    [phone]

## 2019-05-28 LAB
ALBUMIN SERPL BCP-MCNC: 2.8 G/DL (ref 3.5–5.2)
ALP SERPL-CCNC: 72 U/L (ref 55–135)
ALT SERPL W/O P-5'-P-CCNC: 32 U/L (ref 10–44)
ANION GAP SERPL CALC-SCNC: 9 MMOL/L (ref 8–16)
APTT BLDCRRT: 26.7 SEC (ref 21–32)
AST SERPL-CCNC: 21 U/L (ref 10–40)
BASOPHILS # BLD AUTO: 0.02 K/UL (ref 0–0.2)
BASOPHILS NFR BLD: 0.3 % (ref 0–1.9)
BILIRUB SERPL-MCNC: 0.5 MG/DL (ref 0.1–1)
BUN SERPL-MCNC: 18 MG/DL (ref 8–23)
CALCIUM SERPL-MCNC: 9 MG/DL (ref 8.7–10.5)
CATH EF ESTIMATED: 15 %
CHLORIDE SERPL-SCNC: 99 MMOL/L (ref 95–110)
CO2 SERPL-SCNC: 33 MMOL/L (ref 23–29)
CREAT SERPL-MCNC: 1.3 MG/DL (ref 0.5–1.4)
DIFFERENTIAL METHOD: ABNORMAL
EOSINOPHIL # BLD AUTO: 0.7 K/UL (ref 0–0.5)
EOSINOPHIL NFR BLD: 11.7 % (ref 0–8)
ERYTHROCYTE [DISTWIDTH] IN BLOOD BY AUTOMATED COUNT: 15.7 % (ref 11.5–14.5)
EST. GFR  (AFRICAN AMERICAN): >60 ML/MIN/1.73 M^2
EST. GFR  (NON AFRICAN AMERICAN): 58 ML/MIN/1.73 M^2
GLUCOSE SERPL-MCNC: 95 MG/DL (ref 70–110)
HCT VFR BLD AUTO: 46.6 % (ref 40–54)
HGB BLD-MCNC: 14.9 G/DL (ref 14–18)
INR PPP: 1 (ref 0.8–1.2)
LYMPHOCYTES # BLD AUTO: 2.1 K/UL (ref 1–4.8)
LYMPHOCYTES NFR BLD: 32.8 % (ref 18–48)
MCH RBC QN AUTO: 26.8 PG (ref 27–31)
MCHC RBC AUTO-ENTMCNC: 32 G/DL (ref 32–36)
MCV RBC AUTO: 84 FL (ref 82–98)
MONOCYTES # BLD AUTO: 0.5 K/UL (ref 0.3–1)
MONOCYTES NFR BLD: 7.3 % (ref 4–15)
NEUTROPHILS # BLD AUTO: 3 K/UL (ref 1.8–7.7)
NEUTROPHILS NFR BLD: 47.7 % (ref 38–73)
PLATELET # BLD AUTO: 179 K/UL (ref 150–350)
PLATELET BLD QL SMEAR: ABNORMAL
PMV BLD AUTO: 11.8 FL (ref 9.2–12.9)
POTASSIUM SERPL-SCNC: 4 MMOL/L (ref 3.5–5.1)
PROT SERPL-MCNC: 5.5 G/DL (ref 6–8.4)
PROTHROMBIN TIME: 10 SEC (ref 9–12.5)
RBC # BLD AUTO: 5.56 M/UL (ref 4.6–6.2)
SODIUM SERPL-SCNC: 141 MMOL/L (ref 136–145)
WBC # BLD AUTO: 6.31 K/UL (ref 3.9–12.7)

## 2019-05-28 PROCEDURE — 94761 N-INVAS EAR/PLS OXIMETRY MLT: CPT

## 2019-05-28 PROCEDURE — 93458 L HRT ARTERY/VENTRICLE ANGIO: CPT | Mod: 26 | Performed by: INTERNAL MEDICINE

## 2019-05-28 PROCEDURE — 85610 PROTHROMBIN TIME: CPT

## 2019-05-28 PROCEDURE — 36415 COLL VENOUS BLD VENIPUNCTURE: CPT

## 2019-05-28 PROCEDURE — 25000003 PHARM REV CODE 250: Performed by: INTERNAL MEDICINE

## 2019-05-28 PROCEDURE — 25000003 PHARM REV CODE 250: Performed by: STUDENT IN AN ORGANIZED HEALTH CARE EDUCATION/TRAINING PROGRAM

## 2019-05-28 PROCEDURE — 85025 COMPLETE CBC W/AUTO DIFF WBC: CPT

## 2019-05-28 PROCEDURE — 93458 L HRT ARTERY/VENTRICLE ANGIO: CPT | Mod: 26,,, | Performed by: INTERNAL MEDICINE

## 2019-05-28 PROCEDURE — 99152 PR MOD CONSCIOUS SEDATION, SAME PHYS, 5+ YRS, FIRST 15 MIN: ICD-10-PCS | Mod: ,,, | Performed by: INTERNAL MEDICINE

## 2019-05-28 PROCEDURE — C1894 INTRO/SHEATH, NON-LASER: HCPCS | Performed by: INTERNAL MEDICINE

## 2019-05-28 PROCEDURE — 11000001 HC ACUTE MED/SURG PRIVATE ROOM

## 2019-05-28 PROCEDURE — 25500020 PHARM REV CODE 255: Performed by: INTERNAL MEDICINE

## 2019-05-28 PROCEDURE — 99152 MOD SED SAME PHYS/QHP 5/>YRS: CPT | Performed by: INTERNAL MEDICINE

## 2019-05-28 PROCEDURE — 93458 PR CATH PLACE/CORON ANGIO, IMG SUPER/INTERP,W LEFT HEART VENTRICULOGRAPHY: ICD-10-PCS | Mod: 26,,, | Performed by: INTERNAL MEDICINE

## 2019-05-28 PROCEDURE — 63600175 PHARM REV CODE 636 W HCPCS: Performed by: FAMILY MEDICINE

## 2019-05-28 PROCEDURE — 85730 THROMBOPLASTIN TIME PARTIAL: CPT

## 2019-05-28 PROCEDURE — 80053 COMPREHEN METABOLIC PANEL: CPT

## 2019-05-28 PROCEDURE — C1769 GUIDE WIRE: HCPCS | Performed by: INTERNAL MEDICINE

## 2019-05-28 PROCEDURE — C1887 CATHETER, GUIDING: HCPCS | Performed by: INTERNAL MEDICINE

## 2019-05-28 PROCEDURE — 94640 AIRWAY INHALATION TREATMENT: CPT

## 2019-05-28 PROCEDURE — 25000003 PHARM REV CODE 250: Performed by: NURSE PRACTITIONER

## 2019-05-28 PROCEDURE — 99152 MOD SED SAME PHYS/QHP 5/>YRS: CPT | Mod: ,,, | Performed by: INTERNAL MEDICINE

## 2019-05-28 PROCEDURE — 63600175 PHARM REV CODE 636 W HCPCS: Performed by: INTERNAL MEDICINE

## 2019-05-28 RX ORDER — FENTANYL CITRATE 50 UG/ML
INJECTION, SOLUTION INTRAMUSCULAR; INTRAVENOUS
Status: DISCONTINUED | OUTPATIENT
Start: 2019-05-28 | End: 2019-05-28 | Stop reason: HOSPADM

## 2019-05-28 RX ORDER — FUROSEMIDE 20 MG/1
20 TABLET ORAL DAILY
Status: DISCONTINUED | OUTPATIENT
Start: 2019-05-28 | End: 2019-05-29 | Stop reason: HOSPADM

## 2019-05-28 RX ORDER — SODIUM CHLORIDE 9 MG/ML
INJECTION, SOLUTION INTRAVENOUS CONTINUOUS
Status: ACTIVE | OUTPATIENT
Start: 2019-05-28 | End: 2019-05-28

## 2019-05-28 RX ORDER — VERAPAMIL HYDROCHLORIDE 2.5 MG/ML
INJECTION, SOLUTION INTRAVENOUS
Status: DISCONTINUED | OUTPATIENT
Start: 2019-05-28 | End: 2019-05-28 | Stop reason: HOSPADM

## 2019-05-28 RX ORDER — IODIXANOL 320 MG/ML
INJECTION, SOLUTION INTRAVASCULAR
Status: DISCONTINUED | OUTPATIENT
Start: 2019-05-28 | End: 2019-05-28 | Stop reason: HOSPADM

## 2019-05-28 RX ORDER — HEPARIN SODIUM 200 [USP'U]/100ML
INJECTION, SOLUTION INTRAVENOUS
Status: DISCONTINUED | OUTPATIENT
Start: 2019-05-28 | End: 2019-05-29 | Stop reason: HOSPADM

## 2019-05-28 RX ORDER — SODIUM CHLORIDE 9 MG/ML
INJECTION, SOLUTION INTRAVENOUS
Status: DISCONTINUED | OUTPATIENT
Start: 2019-05-28 | End: 2019-05-29 | Stop reason: HOSPADM

## 2019-05-28 RX ORDER — MIDAZOLAM HYDROCHLORIDE 1 MG/ML
INJECTION, SOLUTION INTRAMUSCULAR; INTRAVENOUS
Status: DISCONTINUED | OUTPATIENT
Start: 2019-05-28 | End: 2019-05-28 | Stop reason: HOSPADM

## 2019-05-28 RX ORDER — HEPARIN SODIUM 1000 [USP'U]/ML
INJECTION, SOLUTION INTRAVENOUS; SUBCUTANEOUS
Status: DISCONTINUED | OUTPATIENT
Start: 2019-05-28 | End: 2019-05-28 | Stop reason: HOSPADM

## 2019-05-28 RX ADMIN — LISINOPRIL 20 MG: 20 TABLET ORAL at 08:05

## 2019-05-28 RX ADMIN — ASPIRIN 81 MG: 81 TABLET, COATED ORAL at 08:05

## 2019-05-28 RX ADMIN — SODIUM CHLORIDE 50 ML/HR: 0.9 INJECTION, SOLUTION INTRAVENOUS at 12:05

## 2019-05-28 RX ADMIN — CARVEDILOL 3.12 MG: 3.12 TABLET, FILM COATED ORAL at 08:05

## 2019-05-28 RX ADMIN — FLUTICASONE PROPIONATE 100 MCG: 50 SPRAY, METERED NASAL at 08:05

## 2019-05-28 RX ADMIN — CLOPIDOGREL BISULFATE 75 MG: 75 TABLET, FILM COATED ORAL at 08:05

## 2019-05-28 RX ADMIN — FUROSEMIDE 20 MG: 20 TABLET ORAL at 08:05

## 2019-05-28 RX ADMIN — FLUTICASONE FUROATE AND VILANTEROL TRIFENATATE 1 PUFF: 100; 25 POWDER RESPIRATORY (INHALATION) at 09:05

## 2019-05-28 RX ADMIN — ENOXAPARIN SODIUM 40 MG: 100 INJECTION SUBCUTANEOUS at 05:05

## 2019-05-28 NOTE — PLAN OF CARE
Patient received in cath lab recovery per stretcher with nasal o2 per nurse pam; bedside report received; pt alert oriented, no pain reported; right radial vasc band in place w/o any bleeding swelling or hematoma, iv patent left a/c; skin wm dry, color pink; sinus on monitor; will continue to monitor patient

## 2019-05-28 NOTE — PLAN OF CARE
Problem: Adult Inpatient Plan of Care  Goal: Plan of Care Review  Outcome: Ongoing (interventions implemented as appropriate)  POC discussed with pt. Pt is awake and alert,oriented X4. No distress noted. Denies any pain. Continues to be NSR on tele with HR in 80-90's. Discussed LEFT heart cath for tomorrow.Bed in lowest position. Safety/Fall precautions maintained. Call bell in reach. All questions answered. Verbalized understanding. Will continue to monitor.

## 2019-05-28 NOTE — BRIEF OP NOTE
S/p LHC via R radial        Normal coronaries   EF 10-15% with LVEDP 8 mmHg        Plan:     Medical therapy for CHF   Low salt diet   Evaluate for ICD after 3 months of maximal medical therapy

## 2019-05-28 NOTE — PLAN OF CARE
Problem: Adult Inpatient Plan of Care  Goal: Plan of Care Review  VN attempted rounds, monitor turned off.  Request sent to nurse to turn on.

## 2019-05-28 NOTE — NURSING
[facility] RN clinician attempted to visit patient to inform him of scheduled [facility] appointment and provide heart failure education, patient not in room at this time, family member states he was at a procedure.  Will attempt to visit tomorrow.

## 2019-05-28 NOTE — PROGRESS NOTES
[facility] Medicine Progress Note    Primary Team: [facility] Hospitalist Team B  Attending Physician: Jordin Kincaid MD  Resident: Elmer  Intern: Leoncio    Subjective:     No complaints this morning, is apprehensive concerning his LHC. Answered his questions and provided support. UOP @ -5L total, will further decrease PO lasix. Further card recs to follow LHC.     Objective:     Last 24 Hour Vital Signs:  BP  Min: 107/67  Max: 122/66  Temp  Av.2 °F (36.2 °C)  Min: 96.4 °F (35.8 °C)  Max: 98.1 °F (36.7 °C)  Pulse  Av  Min: 78  Max: 98  Resp  Av.5  Min: 17  Max: 20  SpO2  Av.2 %  Min: 95 %  Max: 97 %  Weight  Av.9 kg (147 lb 7.8 oz)  Min: 66.9 kg (147 lb 7.8 oz)  Max: 66.9 kg (147 lb 7.8 oz)  I/O last 3 completed shifts:  In: 790 [P.O.:790]  Out: 2300 [Urine:2300]    Physical Examination:  General: no acute distress, calm, pleasant, appears stated age  Head: normocephalic, atraumatic   Eyes: EOMI, PERRL, anicteric sclera, clear conjunctiva  Ears, Nose, Throat: MMM, OP without erythema or exudate   Neck: supple, full ROM without pain or stiffness, trachea midline, no thyromegaly, no carotid bruits, JVP 8 cm  Cardiovascular: RRR, audible S1/S2 without murmurs or rubs, S3 absent; radial, DP, and PT pulses 2+ and symmetric, capillary refill time <2s  Pulmonary: normal work of breathing on RA without accessory muscle use, symmetric chest rise, inspiratory wheeze gone  Abdomen: soft, non-tender, non-distended, BS+  MSKL: full passive and active ROM  Lymphatic: no LAD appreciated  Skin: 1+ pitting edema to mid-shin bilaterally (near resolved)  Neurologic: A&Ox4, moving extremities spontaneously, SILT throughout, 5/5 strength in BUE/BLE    Laboratory:  Laboratory Data Reviewed: yes  Pertinent Findings:  Lab Results   Component Value Date    WBC 6.31 2019    HGB 14.9 2019    HCT 46.6 2019    MCV 84 2019     2019     Lab Results   Component Value Date    CREATININE  1.3 05/28/2019    BUN 18 05/28/2019     05/28/2019    K 4.0 05/28/2019    CL 99 05/28/2019    CO2 33 (H) 05/28/2019     Microbiology Data Reviewed: yes  Pertinent Findings:  None    Other Results:  EKG (my interpretation):   No new tracings    Radiology Data Reviewed: yes  Pertinent Findings:  Imaging Results          CTA Chest Non-Coronary (PE Study) (Final result)  Result time 05/25/19 13:52:06    Final result by Dominguez Valencia III, MD (05/25/19 13:52:06)                 Impression:      No evidence of pulmonary embolism.    Mild cardiomegaly with possible findings of right heart failure and minimal interstitial edema.      Electronically signed by: Dominguez Valencia MD  Date:    05/25/2019  Time:    13:52             Narrative:    EXAMINATION:  CTA CHEST NON CORONARY    CLINICAL HISTORY:  Dyspnea, cardiac origin suspected;    TECHNIQUE:  Routine CT angiogram of the chest protocol performed after the IV administration of 100 mL Omnipaque 350. 3D reconstructions/reformats/MIPs obtained. All CT scans at this facility are performed  using dose modulation techniques as appropriate to performed exam including the following:  automated exposure control; adjustment of mA and/or kV according to the patients size (this includes techniques or standardized protocols for targeted exams where dose is matched to indication/reason for exam: i.e. extremities or head);  iterative reconstruction technique.    COMPARISON:  None    FINDINGS:  There is satisfactory opacification of the pulmonary arteries. There is no evidence of pulmonary embolism.  No aortic aneurysm or dissection is identified.  There is mild cardiomegaly.  There is reflux of intravenous contrast from the right atrium into the IVC and hepatic veins which can be seen with right heart failure.  There is no pericardial effusion.  No pathologically enlarged lymph nodes are seen in the chest.  There is mild bibasilar dependent atelectasis.  There is mild bilateral  peribronchial thickening suggesting inflammatory airway disease or mild interstitial edema.  There is minimal interlobular septal thickening in the lung bases which suggests mild interstitial edema.  There is mild right apical pleural scarring.  No acute appearing alveolar infiltrate, pleural effusion or pneumothorax identified.   no acute osseous abnormality is seen. Limited images through the upper abdomen demonstrate no acute findings.                               X-Ray Chest AP Portable (Final result)  Result time 05/25/19 11:44:49    Final result by Dominguez Valencia III, MD (05/25/19 11:44:49)                 Impression:      Cardiomegaly without evidence of pulmonary edema.      Electronically signed by: Dominguez Valencia MD  Date:    05/25/2019  Time:    11:44             Narrative:    EXAMINATION:  XR CHEST AP PORTABLE    CLINICAL HISTORY:  CHF;    COMPARISON:  01/08/2015    FINDINGS:  There is mild cardiomegaly without evidence of pulmonary edema.  The lungs appear clear of active disease. No acute appearing infiltrate, pleural effusion or pneumothorax identified.                              Current Medications:     Infusions:   sodium chloride 0.9%          Scheduled:   aspirin  81 mg Oral Daily    carvedilol  3.125 mg Oral BID    clopidogrel  75 mg Oral Daily    enoxaparin  40 mg Subcutaneous Daily    fluticasone furoate-vilanterol  1 puff Inhalation Daily    fluticasone propionate  2 spray Each Nare Daily    furosemide  20 mg Oral Daily    lisinopril  20 mg Oral Daily        PRN:  albuterol, sodium chloride 0.9%    Antibiotics and Day Number of Therapy:  None    Lines and Day Number of Therapy:  PIV    Assessment:     Dima Quintanilla is a 63 year old male, with a PMHx of asthma, who presented to [facility] complaining of 1 week of LE swelling. He has been admitted for IV diuresis for suspected acute right sided heart failure.      Plan:     Volume overload 2/2 to suspected right-sided heart failure  vs left  Patient presented to [facility] complaining of 1 week of LE edema along with worsening LEMON attributed to asthma exacerbation. On arrival, afebrile and vitals stable with SpO2 97% on RA.   - 2+ pitting edema in bilateral LE, lung ascultation without crackles, JVP slightly elevated after >2L diuresis at [facility]  - NT-proBNP: 4680, CXR with mild cardiomegaly but no pulmonary edema appreciated  - ECG: NSR with interventricular block, LVH, and OLMAN, troponin <0.012, D-Dimer 254  - CTA chest: without evidence of PE but showing reflux of IV contrast from the RA into IVC and hepatic veins which can be seen with RH failure.   - Gave IV lAsix BID on admit w/ aggressive diuresis, transitioned to PO lasix and titrating down as diuresis remains high. Added BBlocker as well.   - Formal echo w/ EF 10% and Grade III DD. Will puruse inpatient [facility] on 5/28.   - f/u Card recs following C     Asthma  Patient has documented asthma, confirmed with PFTs in 2015, whom has had previous hospitalizations for exacerbations requiring intubation (most recently 2015). Asthma likely contributory to patient's SOB for past several days.  - Home meds include: symbicort with rescue albuterol and nebulizer. Patient does not use symbicort daily as instructed but PRN during exacerbations  - fluticasone-vilanterol 1 puff daily  - albuterol inhaler PRN  - stable on RA     Transaminitis, mild and improving  AST 33, ALT 63, ALP and tbili WNL  - no recent labs for comparison  - denies heavy EtOH use  - likely 2/2 to congestion      Metabolic alkalosis  HCO3 34 on admit  - patient carries asthma/COPD diagnosis   - could potentially be compensation from chronic CO2 retention   - monitor     Elevated BP  Patient reports being prescribed lisinopril in the past after ED visits but does not take it. BP slightly elevated on admit.  - will resume lisinopril 20 mg daily  - adding carvedilol 3.125, increase as tolerated     HCM  - not UTD on vaccinations     Code:  full  F: none  E: replete PRN  N: cardiac  PPX: lovenox     Dispo: [facility] today    Nico Payne MD  [facility] Internal Medicine -II    [facility] Medicine Hospitalist Pager numbers:   [facility] Hospitalist Medicine Team A (Yvonne/Bonny): [phone]  [facility] Hospitalist Medicine Team B (Sanjiv/Holly):  [phone]

## 2019-05-28 NOTE — PLAN OF CARE
Problem: Adult Inpatient Plan of Care  Goal: Plan of Care Review  Outcome: Ongoing (interventions implemented as appropriate)  pts o2 sats 97/ra

## 2019-05-28 NOTE — INTERVAL H&P NOTE
The patient has been examined and the H&P has been reviewed:        Anesthesia/Surgery risks, benefits and alternative options discussed and understood by patient/family.          Active Hospital Problems    Diagnosis  POA    *Acute congestive heart failure [I50.9]  Yes    Shortness of breath [R06.02]  Yes    Hypervolemia [E87.70]  Yes    Edema [R60.9]  Yes    Metabolic alkalosis [E87.3]  Yes    Asthma exacerbation [J45.901]  Yes      Resolved Hospital Problems   No resolved problems to display.

## 2019-05-28 NOTE — NURSING
Patient back to room from Cath lab via stretcher.awake,alert,oriented x4. Denies pain. No distress noted. Vasc band noted to right wrist. VSS. Will continue to monitor.

## 2019-05-28 NOTE — PLAN OF CARE
Problem: Adult Inpatient Plan of Care  Goal: Plan of Care Review  Outcome: Ongoing (interventions implemented as appropriate)  Chart review complete.

## 2019-05-28 NOTE — NURSING
Report called to floor nurse--- Marissa; pt is resting quietly in bed; will continue to monitor patient; aware of post cath orders and pending

## 2019-05-29 ENCOUNTER — TELEPHONE (OUTPATIENT)
Dept: CARDIOLOGY | Facility: CLINIC | Age: 64
End: 2019-05-29

## 2019-05-29 VITALS
OXYGEN SATURATION: 96 % | RESPIRATION RATE: 16 BRPM | HEART RATE: 73 BPM | HEIGHT: 66 IN | BODY MASS INDEX: 23.99 KG/M2 | SYSTOLIC BLOOD PRESSURE: 116 MMHG | TEMPERATURE: 98 F | WEIGHT: 149.25 LBS | DIASTOLIC BLOOD PRESSURE: 73 MMHG

## 2019-05-29 LAB
ALBUMIN SERPL BCP-MCNC: 2.6 G/DL (ref 3.5–5.2)
ALP SERPL-CCNC: 65 U/L (ref 55–135)
ALT SERPL W/O P-5'-P-CCNC: 34 U/L (ref 10–44)
ANION GAP SERPL CALC-SCNC: 8 MMOL/L (ref 8–16)
AST SERPL-CCNC: 27 U/L (ref 10–40)
BILIRUB SERPL-MCNC: 0.3 MG/DL (ref 0.1–1)
BUN SERPL-MCNC: 17 MG/DL (ref 8–23)
CALCIUM SERPL-MCNC: 8.9 MG/DL (ref 8.7–10.5)
CHLORIDE SERPL-SCNC: 102 MMOL/L (ref 95–110)
CO2 SERPL-SCNC: 29 MMOL/L (ref 23–29)
CREAT SERPL-MCNC: 1.3 MG/DL (ref 0.5–1.4)
EST. GFR  (AFRICAN AMERICAN): >60 ML/MIN/1.73 M^2
EST. GFR  (NON AFRICAN AMERICAN): 58 ML/MIN/1.73 M^2
GLUCOSE SERPL-MCNC: 108 MG/DL (ref 70–110)
POTASSIUM SERPL-SCNC: 4 MMOL/L (ref 3.5–5.1)
PROT SERPL-MCNC: 5.2 G/DL (ref 6–8.4)
SODIUM SERPL-SCNC: 139 MMOL/L (ref 136–145)

## 2019-05-29 PROCEDURE — 25000003 PHARM REV CODE 250: Performed by: STUDENT IN AN ORGANIZED HEALTH CARE EDUCATION/TRAINING PROGRAM

## 2019-05-29 PROCEDURE — 94640 AIRWAY INHALATION TREATMENT: CPT

## 2019-05-29 PROCEDURE — 94761 N-INVAS EAR/PLS OXIMETRY MLT: CPT

## 2019-05-29 PROCEDURE — 99232 PR SUBSEQUENT HOSPITAL CARE,LEVL II: ICD-10-PCS | Mod: ,,, | Performed by: NURSE PRACTITIONER

## 2019-05-29 PROCEDURE — 25000003 PHARM REV CODE 250: Performed by: NURSE PRACTITIONER

## 2019-05-29 PROCEDURE — 99232 SBSQ HOSP IP/OBS MODERATE 35: CPT | Mod: ,,, | Performed by: NURSE PRACTITIONER

## 2019-05-29 PROCEDURE — 80053 COMPREHEN METABOLIC PANEL: CPT

## 2019-05-29 PROCEDURE — 36415 COLL VENOUS BLD VENIPUNCTURE: CPT

## 2019-05-29 RX ORDER — ASPIRIN 81 MG/1
81 TABLET ORAL DAILY
Refills: 0 | COMMUNITY
Start: 2019-05-30 | End: 2019-06-03 | Stop reason: SDUPTHER

## 2019-05-29 RX ORDER — CARVEDILOL 3.12 MG/1
3.12 TABLET ORAL 2 TIMES DAILY
Qty: 60 TABLET | Refills: 11 | Status: SHIPPED | OUTPATIENT
Start: 2019-05-29 | End: 2019-09-16 | Stop reason: SDUPTHER

## 2019-05-29 RX ORDER — FUROSEMIDE 20 MG/1
20 TABLET ORAL DAILY
Qty: 30 TABLET | Refills: 11 | Status: SHIPPED | OUTPATIENT
Start: 2019-05-30 | End: 2019-09-16 | Stop reason: SDUPTHER

## 2019-05-29 RX ORDER — FLUTICASONE PROPIONATE 50 MCG
2 SPRAY, SUSPENSION (ML) NASAL DAILY
Qty: 16 G | Refills: 0 | Status: SHIPPED | OUTPATIENT
Start: 2019-05-29

## 2019-05-29 RX ADMIN — LISINOPRIL 20 MG: 20 TABLET ORAL at 09:05

## 2019-05-29 RX ADMIN — FUROSEMIDE 20 MG: 20 TABLET ORAL at 09:05

## 2019-05-29 RX ADMIN — CLOPIDOGREL BISULFATE 75 MG: 75 TABLET, FILM COATED ORAL at 09:05

## 2019-05-29 RX ADMIN — FLUTICASONE FUROATE AND VILANTEROL TRIFENATATE 1 PUFF: 100; 25 POWDER RESPIRATORY (INHALATION) at 09:05

## 2019-05-29 RX ADMIN — ASPIRIN 81 MG: 81 TABLET, COATED ORAL at 09:05

## 2019-05-29 RX ADMIN — CARVEDILOL 3.12 MG: 3.12 TABLET, FILM COATED ORAL at 09:05

## 2019-05-29 NOTE — TELEPHONE ENCOUNTER
Pt can be scheduled for sooner clinical germaine with either blake or Prieto     Let me know if 2 week hosp f/u with NP clinic would be fine

## 2019-05-29 NOTE — NURSING
[facility] RN clinician visited patient at hospital bedside.  Patient and family member were present upon my visit.  Educated patient on purpose of [facility].  Discussed with patient that he would be followed by [facility] physician for approximately 30 days post hospital discharge, rather than [facility] physician.  Also, discussed that he should call clinic for any medical needs or urgent visits while under the care of [facility] physician.  Patient given and agreed on scheduled appointment time and date.  Patient given [facility] introduction sheet containing clinic phone number.  Patient admitted with diagnosis of heart failure, patient states this is a new diagnosis.  Signs and symptoms of heart failure to report discussed in detail.  Informed patient to promptly notify [facility] or cardiologist for worsening symptoms or if urgent appointment needed after discharge.  Discussed importance of taking daily weights and what to do if there is a 2-3 pound weight gain in a day or 5 pound or more weight gain in one week.  Patient states he has a scale at home.  Patient educated on low sodium diet and fluid limitation.  Reviewed with patient and family member (who sometimes cooks food) some high sodium foods and drinks the patient should avoid.  Instructions given on performing activities as tolerated, otherwise, instructed by physician.  Urged patient on importance of medication compliance related to heart failure diagnosis. Recommended patient to receive all prescribed medications from [facility] before discharge,patient agrees.  8 Step Plan for Heart Failure Patients booklet given to patient.  Patient and family member given opportunity to ask questions, stated understanding of education provided.

## 2019-05-29 NOTE — TELEPHONE ENCOUNTER
----- Message from Kandy Rodriguez RN sent at 5/29/2019 11:54 AM CDT -----  Contact: Kandy,   Hi, pt will need f/u with MD, I scheduled first available for 7/10 but please book if sooner if need be.      Kandy Rodriguez RN    [phone]

## 2019-05-29 NOTE — PROGRESS NOTES
[facility]  Cardiology  Progress Note    Patient Name: Dima Quintanilla  MRN: [id number]  Admission Date: 5/25/2019  Hospital Length of Stay: 2 days  Code Status: Full Code   Attending Physician: Jordin Kincaid MD   Primary Care Physician: Sweetie Riojas MD  Expected Discharge Date: 5/29/2019  Principal Problem:Acute congestive heart failure    Subjective:     Hospital Course:   5/25/2019-5/26/2019 Presented with SOB and LE edema. Aggressively diuresed with IV Lasix with good response. Transitioned to oral Lasix. Concerned about CHF-systolic etiology therefore echocardiogram orderd  5/27/2019 On Coreg BID along with daily Lisinopril with transition to oral Lasix BID. 650cc out overnight and negative 4.1 liters since admission. Echocardiogram today with following results:   · Severely decreased left ventricular systolic function. The estimated ejection fraction is 10%  · Grade III (severe) left ventricular diastolic dysfunction consistent with restrictive physiology.  · Moderate left atrial enlargement.  · Moderate mitral regurgitation.  · Septal wall has abnormal motion consistent with left bundle branch block.  · Severe global hypokinetic wall motion.  · Low normal right ventricular systolic function.  · Normal central venous pressure (3 mm Hg).  · The estimated PA systolic pressure is 27 mm Hg     Discussion in regards to concern of severely depressed LVEF and recommend LHC for further evaluation-will plan to proceed tomorrow   5/28/2019 NPO for LHC today  5/29/2019 Underwent LHC yesterday via right radial access with normal coronaries and LVEF 10-15% with LVEDP 8 mmHg. Continued on GDMT. On oral Lasix with 550cc out overnight negative 6.0 liters since admission. Right radial access stable. Will ambulate in hallway            Review of Systems   Constitution: Negative for chills, decreased appetite, diaphoresis and fever.   Cardiovascular: Negative for chest pain, claudication, cyanosis, dyspnea on  exertion, irregular heartbeat, leg swelling, near-syncope, orthopnea, palpitations, paroxysmal nocturnal dyspnea and syncope.   Respiratory: Negative for cough, hemoptysis, shortness of breath and wheezing.    Gastrointestinal: Negative for bloating, abdominal pain, constipation, diarrhea, melena, nausea and vomiting.   Neurological: Negative for dizziness and weakness.     Objective:     Vital Signs (Most Recent):  Temp: 97.1 °F (36.2 °C) (05/29/19 0824)  Pulse: 82 (05/29/19 0910)  Resp: 16 (05/29/19 0910)  BP: 110/69 (05/29/19 0824)  SpO2: 97 % (05/29/19 0910) Vital Signs (24h Range):  Temp:  [96.5 °F (35.8 °C)-98.3 °F (36.8 °C)] 97.1 °F (36.2 °C)  Pulse:  [] 82  Resp:  [16-21] 16  SpO2:  [96 %-99 %] 97 %  BP: ()/(66-84) 110/69     Weight: 67.7 kg (149 lb 4 oz)  Body mass index is 24.09 kg/m².     SpO2: 97 %  O2 Device (Oxygen Therapy): room air      Intake/Output Summary (Last 24 hours) at 5/29/2019 1138  Last data filed at 5/29/2019 0915  Gross per 24 hour   Intake 360 ml   Output 1300 ml   Net -940 ml       Lines/Drains/Airways     Peripheral Intravenous Line                 Peripheral IV - Single Lumen 05/25/19 1137 20 G Left Antecubital 4 days                Physical Exam   Constitutional: He is oriented to person, place, and time. He appears well-developed and well-nourished. No distress.   Cardiovascular: Normal rate and regular rhythm. Exam reveals no gallop.   No murmur heard.  Pulmonary/Chest: Effort normal and breath sounds normal. No respiratory distress. He has no wheezes.   Abdominal: Soft. Bowel sounds are normal. He exhibits no distension. There is no tenderness.   Neurological: He is alert and oriented to person, place, and time.   Skin: Skin is warm and dry.       Significant Labs:     Recent Labs   Lab 05/29/19  0410      K 4.0      CO2 29   BUN 17   CREATININE 1.3         Significant Imaging:     TTE 5/27/2019    · Severely decreased left ventricular systolic function.  The estimated ejection fraction is 10%  · Grade III (severe) left ventricular diastolic dysfunction consistent with restrictive physiology.  · Moderate left atrial enlargement.  · Moderate mitral regurgitation.  · Septal wall has abnormal motion consistent with left bundle branch block.  · Severe global hypokinetic wall motion.  · Low normal right ventricular systolic function.  · Normal central venous pressure (3 mm Hg).  · The estimated PA systolic pressure is 27 mm Hg    Assessment and Plan:     Brief HPI: Seen on AM NP rounds with wife at the bedside. Denies any complaints currently and reports SOB improved. Reviewed angiogram findings from yesterday. Reviewed POC with patient as detailed above. Will need outpatient cardiology follow up     * Acute congestive heart failure  -aggressively diuresed with IV Lasix with transition to oral Lasix  -negative 4.1 liters since admission  -on BB, ACEI and Lasix BID  -echo with severely depressed LV function with EF 10%; concerned about ischemic etiology therefore LHC recommended for further evaluaton   -LHC with normal coronaries; recommend continued medical management; will need repeat echo in 3-4 months for re evaluation of LVEF; if EF remains less than 35% will need evaluation for AICD       Edema  -improved with IV diuresis  -will continue Lasix BID         VTE Risk Mitigation (From admission, onward)        Ordered     heparin infusion 1,000 units/500 ml in 0.9% NaCl (pressure line flush)  Intra-op continuous PRN      05/28/19 1130     enoxaparin injection 40 mg  Daily      05/25/19 1606     IP VTE HIGH RISK PATIENT  Once      05/25/19 1606          Xin Flores APRN, ANP  Cardiology  [facility]

## 2019-05-29 NOTE — PROGRESS NOTES
"[facility] Hospital Medicine Progress Note    Primary Team: [facility] Hospitalist Team B  Attending Physician: Jordin Kincaid MD  Resident: Elmer  Intern: Leoncio    Subjective:     Patient tolerated procedure well. Feels "good" this morning. No irritation or oozing from catheter site. Denies fever, chills, cp, and SOB. Still c/o mild sinus congestion.     Objective:     Last 24 Hour Vital Signs:  BP  Min: 97/66  Max: 130/68  Temp  Av.6 °F (36.4 °C)  Min: 96.5 °F (35.8 °C)  Max: 98.3 °F (36.8 °C)  Pulse  Av.4  Min: 71  Max: 103  Resp  Av.8  Min: 17  Max: 21  SpO2  Av.1 %  Min: 96 %  Max: 99 %  Weight  Av.7 kg (149 lb 4 oz)  Min: 67.7 kg (149 lb 4 oz)  Max: 67.7 kg (149 lb 4 oz)  I/O last 3 completed shifts:  In: 430 [P.O.:430]  Out: 1350 [Urine:1350]    Physical Examination:  General: no acute distress, calm, pleasant, appears stated age  Head: normocephalic, atraumatic   Eyes: EOMI, PERRL, anicteric sclera, clear conjunctiva  Ears, Nose, Throat: MMM, OP without erythema or exudate   Neck: supple, full ROM without pain or stiffness, trachea midline, no thyromegaly, no carotid bruits, JVP 8 cm  Cardiovascular: RRR, audible S1/S2 without murmurs or rubs, S3 absent; radial, DP, and PT pulses 2+ and symmetric, capillary refill time <2s  Pulmonary: normal work of breathing on RA without accessory muscle use, symmetric chest rise, inspiratory wheeze gone  Abdomen: soft, non-tender, non-distended, BS+  MSKL: full passive and active ROM  Lymphatic: no LAD appreciated  Skin: 1+ pitting edema to mid-shin bilaterally (near resolved)  Neurologic: A&Ox4, moving extremities spontaneously, SILT throughout, 5/5 strength in BUE/BLE    Laboratory:  Laboratory Data Reviewed: yes  Pertinent Findings:  Lab Results   Component Value Date    WBC 6.31 2019    HGB 14.9 2019    HCT 46.6 2019    MCV 84 2019     2019     Lab Results   Component Value Date    CREATININE 1.3 2019    BUN 17 " 05/29/2019     05/29/2019    K 4.0 05/29/2019     05/29/2019    CO2 29 05/29/2019     Microbiology Data Reviewed: yes  Pertinent Findings:  None    Other Results:  EKG (my interpretation):   No new tracings    Radiology Data Reviewed: yes  Pertinent Findings:  Imaging Results          CTA Chest Non-Coronary (PE Study) (Final result)  Result time 05/25/19 13:52:06    Final result by Dominguez Valencia III, MD (05/25/19 13:52:06)                 Impression:      No evidence of pulmonary embolism.    Mild cardiomegaly with possible findings of right heart failure and minimal interstitial edema.      Electronically signed by: Dominguez Valencia MD  Date:    05/25/2019  Time:    13:52             Narrative:    EXAMINATION:  CTA CHEST NON CORONARY    CLINICAL HISTORY:  Dyspnea, cardiac origin suspected;    TECHNIQUE:  Routine CT angiogram of the chest protocol performed after the IV administration of 100 mL Omnipaque 350. 3D reconstructions/reformats/MIPs obtained. All CT scans at this facility are performed  using dose modulation techniques as appropriate to performed exam including the following:  automated exposure control; adjustment of mA and/or kV according to the patients size (this includes techniques or standardized protocols for targeted exams where dose is matched to indication/reason for exam: i.e. extremities or head);  iterative reconstruction technique.    COMPARISON:  None    FINDINGS:  There is satisfactory opacification of the pulmonary arteries. There is no evidence of pulmonary embolism.  No aortic aneurysm or dissection is identified.  There is mild cardiomegaly.  There is reflux of intravenous contrast from the right atrium into the IVC and hepatic veins which can be seen with right heart failure.  There is no pericardial effusion.  No pathologically enlarged lymph nodes are seen in the chest.  There is mild bibasilar dependent atelectasis.  There is mild bilateral peribronchial thickening  suggesting inflammatory airway disease or mild interstitial edema.  There is minimal interlobular septal thickening in the lung bases which suggests mild interstitial edema.  There is mild right apical pleural scarring.  No acute appearing alveolar infiltrate, pleural effusion or pneumothorax identified.   no acute osseous abnormality is seen. Limited images through the upper abdomen demonstrate no acute findings.                               X-Ray Chest AP Portable (Final result)  Result time 05/25/19 11:44:49    Final result by Dominguez Valencia III, MD (05/25/19 11:44:49)                 Impression:      Cardiomegaly without evidence of pulmonary edema.      Electronically signed by: Dominguez Valencia MD  Date:    05/25/2019  Time:    11:44             Narrative:    EXAMINATION:  XR CHEST AP PORTABLE    CLINICAL HISTORY:  CHF;    COMPARISON:  01/08/2015    FINDINGS:  There is mild cardiomegaly without evidence of pulmonary edema.  The lungs appear clear of active disease. No acute appearing infiltrate, pleural effusion or pneumothorax identified.                              Current Medications:     Infusions:   sodium chloride 0.9% 3 mL/kg/hr (05/28/19 1130)    heparin (porcine)          Scheduled:   aspirin  81 mg Oral Daily    carvedilol  3.125 mg Oral BID    clopidogrel  75 mg Oral Daily    enoxaparin  40 mg Subcutaneous Daily    fluticasone furoate-vilanterol  1 puff Inhalation Daily    fluticasone propionate  2 spray Each Nare Daily    furosemide  20 mg Oral Daily    lisinopril  20 mg Oral Daily        PRN:  sodium chloride 0.9%, albuterol, heparin (porcine), sodium chloride 0.9%    Antibiotics and Day Number of Therapy:  None    Lines and Day Number of Therapy:  PIV    Assessment:     Dima Quintanilla is a 63 year old male, with a PMHx of asthma, who presented to [facility] complaining of 1 week of LE swelling. He has been admitted for IV diuresis for suspected acute decompensated heart failure.       Plan:     Volume overload 2/2 to acute decompensated HFrEF  Patient presented to [facility] complaining of 1 week of LE edema along with worsening LEMON attributed to asthma exacerbation. On arrival, afebrile and vitals stable with SpO2 97% on RA.   - 2+ pitting edema in bilateral LE, lung ascultation without crackles, JVP slightly elevated after >2L diuresis at [facility]  - NT-proBNP: 4680, CXR with mild cardiomegaly but no pulmonary edema appreciated  - ECG: NSR with interventricular block, LVH, and OLMAN, troponin <0.012, D-Dimer 254  - CTA chest: without evidence of PE but showing reflux of IV contrast from the RA into IVC and hepatic veins which can be seen with RH failure.   - Gave IV lasix BID on admit w/ aggressive diuresis, transitioned to PO lasix and titrating down as diuresis remains high. Added BBlocker as well.   - Formal echo w/ EF 10% and Grade III DD. Scheduled for inpatient LHC on 5/28.   - LHC showing 10-15% LVEF, continue current ACEi, BB, and lasix. Will need evaluation for ICD placement after 3 months of medical management.     Asthma  Patient has documented asthma, confirmed with PFTs in 2015, whom has had previous hospitalizations for exacerbations requiring intubation (most recently 2015). Asthma likely contributory to patient's SOB for past several days.  - Home meds include: symbicort with rescue albuterol and nebulizer. Patient does not use symbicort daily as instructed but PRN during exacerbations  - fluticasone-vilanterol 1 puff daily  - albuterol inhaler PRN  - stable on RA     Transaminitis, resolved  AST 33, ALT 63, ALP and tbili WNL  - no recent labs for comparison  - denies heavy EtOH use  - likely 2/2 to congestion    - AST 27, ALT 34 today     Metabolic alkalosis  HCO3 34 on admit  - patient carries asthma/COPD diagnosis   - could potentially be compensation from chronic CO2 retention   - continue to monitor     Elevated BP  Patient reports being prescribed lisinopril in the past after ED  visits but does not take it. BP slightly elevated on admit.  - will resume lisinopril 20 mg daily  - adding carvedilol 3.125, increase as tolerated     HCM  - not UTD on vaccinations     Code: full  F: none  E: replete PRN  N: cardiac  PPX: lovenox     Dispo: discharge home today. Will need close cardiology follow up and evaluation for ICD placement after 3 months of medical management.    Jluis Fang MD  [facility] Internal Medicine HO-II    [facility] Medicine Hospitalist Pager numbers:   [facility] Hospitalist Medicine Team A (Yvonne/Bonny): [phone]  [facility] Hospitalist Medicine Team B (Sanjiv/Holly):  [phone]

## 2019-05-29 NOTE — PLAN OF CARE
Problem: Adult Inpatient Plan of Care  Goal: Plan of Care Review  Outcome: Ongoing (interventions implemented as appropriate)  Pt on RA with documented sats.97. The proper method of use, as well as anticipated side effects, of this metered-dose inhaler are discussed and demonstrated to the patient. Will continue to monitor.

## 2019-05-29 NOTE — SUBJECTIVE & OBJECTIVE
Review of Systems   Constitution: Negative for chills, decreased appetite, diaphoresis and fever.   Cardiovascular: Negative for chest pain, claudication, cyanosis, dyspnea on exertion, irregular heartbeat, leg swelling, near-syncope, orthopnea, palpitations, paroxysmal nocturnal dyspnea and syncope.   Respiratory: Negative for cough, hemoptysis, shortness of breath and wheezing.    Gastrointestinal: Negative for bloating, abdominal pain, constipation, diarrhea, melena, nausea and vomiting.   Neurological: Negative for dizziness and weakness.     Objective:     Vital Signs (Most Recent):  Temp: 97.1 °F (36.2 °C) (05/29/19 0824)  Pulse: 82 (05/29/19 0910)  Resp: 16 (05/29/19 0910)  BP: 110/69 (05/29/19 0824)  SpO2: 97 % (05/29/19 0910) Vital Signs (24h Range):  Temp:  [96.5 °F (35.8 °C)-98.3 °F (36.8 °C)] 97.1 °F (36.2 °C)  Pulse:  [] 82  Resp:  [16-21] 16  SpO2:  [96 %-99 %] 97 %  BP: ()/(66-84) 110/69     Weight: 67.7 kg (149 lb 4 oz)  Body mass index is 24.09 kg/m².     SpO2: 97 %  O2 Device (Oxygen Therapy): room air      Intake/Output Summary (Last 24 hours) at 5/29/2019 1138  Last data filed at 5/29/2019 0915  Gross per 24 hour   Intake 360 ml   Output 1300 ml   Net -940 ml       Lines/Drains/Airways     Peripheral Intravenous Line                 Peripheral IV - Single Lumen 05/25/19 1137 20 G Left Antecubital 4 days                Physical Exam   Constitutional: He is oriented to person, place, and time. He appears well-developed and well-nourished. No distress.   Cardiovascular: Normal rate and regular rhythm. Exam reveals no gallop.   No murmur heard.  Pulmonary/Chest: Effort normal and breath sounds normal. No respiratory distress. He has no wheezes.   Abdominal: Soft. Bowel sounds are normal. He exhibits no distension. There is no tenderness.   Neurological: He is alert and oriented to person, place, and time.   Skin: Skin is warm and dry.       Significant Labs:     Recent Labs   Lab  05/29/19  0410      K 4.0      CO2 29   BUN 17   CREATININE 1.3         Significant Imaging:     TTE 5/27/2019    · Severely decreased left ventricular systolic function. The estimated ejection fraction is 10%  · Grade III (severe) left ventricular diastolic dysfunction consistent with restrictive physiology.  · Moderate left atrial enlargement.  · Moderate mitral regurgitation.  · Septal wall has abnormal motion consistent with left bundle branch block.  · Severe global hypokinetic wall motion.  · Low normal right ventricular systolic function.  · Normal central venous pressure (3 mm Hg).  · The estimated PA systolic pressure is 27 mm Hg

## 2019-05-29 NOTE — ASSESSMENT & PLAN NOTE
-aggressively diuresed with IV Lasix with transition to oral Lasix  -negative 4.1 liters since admission  -on BB, ACEI and Lasix BID  -echo with severely depressed LV function with EF 10%; concerned about ischemic etiology therefore LHC recommended for further evaluaton   -C with normal coronaries; recommend continued medical management; will need repeat echo in 3-4 months for re evaluation of LVEF; if EF remains less than 35% will need evaluation for AICD

## 2019-05-29 NOTE — PLAN OF CARE
D/C rounds complete. All questions answered.  Nurse to discuss d/c medications.  Discussed need to keep f/u appts, adherence to medication regimen for health maintenance, verbalized understanding.    Message to Dr Pabon's clinic with need for f/u, accepted first available appt 7/2019 but informed to contact pt with sooner appt if need be.      Discussed [facility], pt is eager to attend.  Will wait pharmacy delivery of medications.  Pt has transportation home.  No additional needs at this time.      Future Appointments   Date Time Provider Department Center   6/3/2019 10:30 AM Monika Santiago MD [facility]   7/10/2019 11:40 AM Nikunj Pabon MD [facility] [place]        05/29/19 1149   Final Note   Assessment Type Final Discharge Note   Anticipated Discharge Disposition Home   Hospital Follow Up  Appt(s) scheduled? Yes   Discharge plans and expectations educations in teach back method with documentation complete? Yes   Right Care Referral Info   Post Acute Recommendation No Care       Kandy Rodriguez, MAURA    [phone]

## 2019-05-29 NOTE — PLAN OF CARE
VN reviewed discharge instructions with pt.  AVS printed and handed to pt by bedside nurse.  Reviewed followup appts, medication, diet, and importance of medication compliance.  Reviewed home care instructions, treatment plan, self management and when to seek medical attention.  Allowed time for questions, all questions answered.  Pt verbalized complete understanding of discharge instructions and voices no concerns.      Discharge instructions complete.  Bedside delivery done.  Transport wheelchair requested.  Bedside nurse notified.

## 2019-05-30 ENCOUNTER — PATIENT OUTREACH (OUTPATIENT)
Dept: ADMINISTRATIVE | Facility: CLINIC | Age: 64
End: 2019-05-30

## 2019-05-30 NOTE — NURSING
Patient safety maintained. Medications administered as ordered. Assistance provided a needed. Printed documentation provided on discharge. IV removed patient tolerated well. Telemetry monitor removed and returned to the monitor room.

## 2019-05-30 NOTE — DISCHARGE SUMMARY
[facility] Hospital Medicine Discharge Summary    Primary Team: [facility] Hospitalist Team B  Attending Physician: Jordin Kincaid MD  Resident: Elmer  Intern: Leoncio    Date of Admit: 5/25/2019  Date of Discharge: 5/30/2019    Discharge to: Home  Condition: Stable    Discharge Diagnoses     Patient Active Problem List   Diagnosis    COPD with acute exacerbation    Respiratory failure with hypercapnia    Anemia of other chronic disease    Hypophosphatemia    Asthma exacerbation    Asthma with COPD with exacerbation    Acute congestive heart failure    Edema    Metabolic alkalosis    Shortness of breath    Hypervolemia    Cardiac rhythm disorder or disturbance or change       Consultants and Procedures     Consultants:  Cardiology    Procedures:   Left heart cath, angiogram  · S/p right transradial LHC  · Normal coronary arteries.  · EF 10-15% with LVEDP 8 mmHg     Imaging:  Imaging Results          CTA Chest Non-Coronary (PE Study) (Final result)  Result time 05/25/19 13:52:06    Final result by Dominguez Valencia III, MD (05/25/19 13:52:06)                 Impression:      No evidence of pulmonary embolism.    Mild cardiomegaly with possible findings of right heart failure and minimal interstitial edema.      Electronically signed by: Dominguez Valencia MD  Date:    05/25/2019  Time:    13:52             Narrative:    EXAMINATION:  CTA CHEST NON CORONARY    CLINICAL HISTORY:  Dyspnea, cardiac origin suspected;    TECHNIQUE:  Routine CT angiogram of the chest protocol performed after the IV administration of 100 mL Omnipaque 350. 3D reconstructions/reformats/MIPs obtained. All CT scans at this facility are performed  using dose modulation techniques as appropriate to performed exam including the following:  automated exposure control; adjustment of mA and/or kV according to the patients size (this includes techniques or standardized protocols for targeted exams where dose is matched to indication/reason for exam: i.e.  extremities or head);  iterative reconstruction technique.    COMPARISON:  None    FINDINGS:  There is satisfactory opacification of the pulmonary arteries. There is no evidence of pulmonary embolism.  No aortic aneurysm or dissection is identified.  There is mild cardiomegaly.  There is reflux of intravenous contrast from the right atrium into the IVC and hepatic veins which can be seen with right heart failure.  There is no pericardial effusion.  No pathologically enlarged lymph nodes are seen in the chest.  There is mild bibasilar dependent atelectasis.  There is mild bilateral peribronchial thickening suggesting inflammatory airway disease or mild interstitial edema.  There is minimal interlobular septal thickening in the lung bases which suggests mild interstitial edema.  There is mild right apical pleural scarring.  No acute appearing alveolar infiltrate, pleural effusion or pneumothorax identified.   no acute osseous abnormality is seen. Limited images through the upper abdomen demonstrate no acute findings.                               X-Ray Chest AP Portable (Final result)  Result time 05/25/19 11:44:49    Final result by Dominguez Valencia III, MD (05/25/19 11:44:49)                 Impression:      Cardiomegaly without evidence of pulmonary edema.      Electronically signed by: Dominguez Valencia MD  Date:    05/25/2019  Time:    11:44             Narrative:    EXAMINATION:  XR CHEST AP PORTABLE    CLINICAL HISTORY:  CHF;    COMPARISON:  01/08/2015    FINDINGS:  There is mild cardiomegaly without evidence of pulmonary edema.  The lungs appear clear of active disease. No acute appearing infiltrate, pleural effusion or pneumothorax identified.                              5/27  TTE  · Severely decreased left ventricular systolic function. The estimated ejection fraction is 10%  · Grade III (severe) left ventricular diastolic dysfunction consistent with restrictive physiology.  · Moderate left atrial  enlargement.  · Moderate mitral regurgitation.  · Septal wall has abnormal motion consistent with left bundle branch block.  · Severe global hypokinetic wall motion.  · Low normal right ventricular systolic function.  · Normal central venous pressure (3 mm Hg).  · The estimated PA systolic pressure is 27 mm Hg    Brief History of Present Illness      Dima Quintanilla is a 63 year old male, with a PMHx of asthma, who presented to [facility] ED on 5/25/2019 with the primary complaint of LE swelling for the past week.     Mr. Quintanilla was in his USOH - lives with wife and daughters, independent in ADLs, able to walk several blocks without restriction and uses rescue inhaler as needed - until about one week PTP, when patient noticed swelling in bilateral ankles and feet. The swelling gradually worsened over this time period and it began to feel uncomfortable to walk so he reported to the ED. Patient denies any calf tenderness or warmth. He also endorses increasing LEMON over this time period that he attributes to asthma. He denies any chest pains, palpitations, cough, abdominal pain or swelling, fever, chills, N/V, HA, dizziness, blurry vision or change in BMs. Of note, patient endrorses chronic orthopnea with PND and props up on 2 pillows to sleep, but this is unchanged recently.     Hospital Course By Problem with Pertinent Findings     Volume overload 2/2 to acute decompensated HFrEF  Patient presented to [facility] complaining of 1 week of LE edema along with worsening LEMON attributed to asthma exacerbation. On arrival, afebrile and vitals stable with SpO2 97% on RA.   - 2+ pitting edema in bilateral LE, lung ascultation without crackles, JVP slightly elevated after >2L diuresis at [facility]  - NT-proBNP: 4680, CXR with mild cardiomegaly but no pulmonary edema appreciated  - ECG: NSR with interventricular block, LVH, and OLMAN, troponin <0.012, D-Dimer 254  - CTA chest: without evidence of PE but showing reflux of IV contrast from the RA  into IVC and hepatic veins which can be seen with RH failure.   - Gave IV lasix BID on admit w/ aggressive diuresis, transitioned to PO lasix and titrating down as diuresis remains high. Added BBlocker as well.   - Formal echo w/ EF 10% and Grade III DD. Scheduled for inpatient LHC on 5/28.   - LHC showing 10-15% LVEF, continue current ACEi, BB, and lasix. Will need evaluation for ICD placement after 3 months of medical management.     Asthma  Patient has documented asthma, confirmed with PFTs in 2015, whom has had previous hospitalizations for exacerbations requiring intubation (most recently 2015). Asthma likely contributory to patient's SOB for past several days.  - Home meds include: symbicort with rescue albuterol and nebulizer. Patient does not use symbicort daily as instructed but PRN during exacerbations  - fluticasone-vilanterol 1 puff daily  - albuterol inhaler PRN  - stable on RA throughout admission     Transaminitis, resolved  AST 33, ALT 63, ALP and tbili WNL  - no recent labs for comparison  - denies heavy EtOH use  - likely 2/2 to congestion    - transaminases normal at discharge     Metabolic alkalosis  HCO3 34 on admit  - patient carries asthma/COPD diagnosis   - could potentially be compensation from chronic CO2 retention      Elevated BP  Patient reports being prescribed lisinopril in the past after ED visits but does not take it. BP slightly elevated on admit.  - resumed lisinopril 20 mg daily  - added carvedilol 3.125, increase as nee in outpatient setting    Discharge Medications      Dima Quintanilla   Home Medication Instructions EVERARDO:[id number]    Printed on:05/30/19 1357   Medication Information                      albuterol (ACCUNEB) 0.63 mg/3 mL Nebu  Take 3 mLs (0.63 mg total) by nebulization every 6 (six) hours as needed.             albuterol (PROAIR HFA) 90 mcg/actuation inhaler  Inhale 2 puffs into the lungs every 6 (six) hours as needed for Wheezing or Shortness of Breath (or  cough).             aspirin (ECOTRIN) 81 MG EC tablet  Take 1 tablet (81 mg total) by mouth once daily.             budesonide-formoterol 160-4.5 mcg (SYMBICORT) 160-4.5 mcg/actuation HFAA  Inhale 2 puffs into the lungs every 12 (twelve) hours.             carvedilol (COREG) 3.125 MG tablet  Take 1 tablet (3.125 mg total) by mouth 2 (two) times daily.             fluticasone propionate (FLONASE) 50 mcg/actuation nasal spray  spray 2 sprays (100 mcg total) by Each Nare route once daily.             furosemide (LASIX) 20 MG tablet  Take 1 tablet (20 mg total) by mouth once daily.             lisinopril (PRINIVIL,ZESTRIL) 20 MG tablet  Take 1 tablet (20 mg total) by mouth once daily.                 Discharge Information:   Diet:  Cardiac    Physical Activity:  Advance as tolerated             Instructions:  1. Take all medications as prescribed  2. Keep all follow-up appointments  3. Return to the hospital or call your primary care physicians if any worsening symptoms such as fever, chest pain, shortness of breath, return of symptoms, or any other concerns.    Follow-Up Appointments:  Cardiology 6/18  [facility] Santiago 6/3    Jluis Fang MD  [facility] Internal Medicine, HO-1

## 2019-05-30 NOTE — TELEPHONE ENCOUNTER
C3 nurse attempted to contact patient. No answer. The following message was left for the patient to return the call:  Good morning  I am a nurse calling on behalf of [facility] from the [facility].  This is a Transitional Care Call for Dima Quintanilla. When you have a moment please contact us at [phone] or [phone] Monday through Friday, between the hours of 8 am to 4 pm. We look forward to speaking with you. On behalf of [facility] have a nice day.    The patient has a scheduled HOSFU appointment with Monika Santiago MD on 6/3/2019 AT 10:30 am

## 2019-05-30 NOTE — PATIENT INSTRUCTIONS

## 2019-06-03 ENCOUNTER — OFFICE VISIT (OUTPATIENT)
Dept: PRIMARY CARE CLINIC | Facility: CLINIC | Age: 64
End: 2019-06-03
Payer: MEDICARE

## 2019-06-03 ENCOUNTER — LAB VISIT (OUTPATIENT)
Dept: LAB | Facility: HOSPITAL | Age: 64
End: 2019-06-03
Attending: INTERNAL MEDICINE
Payer: MEDICARE

## 2019-06-03 VITALS
SYSTOLIC BLOOD PRESSURE: 110 MMHG | TEMPERATURE: 98 F | BODY MASS INDEX: 23.86 KG/M2 | WEIGHT: 147.81 LBS | DIASTOLIC BLOOD PRESSURE: 67 MMHG | HEART RATE: 80 BPM | OXYGEN SATURATION: 95 %

## 2019-06-03 DIAGNOSIS — I50.41 ACUTE COMBINED SYSTOLIC AND DIASTOLIC CONGESTIVE HEART FAILURE: Primary | ICD-10-CM

## 2019-06-03 DIAGNOSIS — I50.41 ACUTE COMBINED SYSTOLIC AND DIASTOLIC CONGESTIVE HEART FAILURE: ICD-10-CM

## 2019-06-03 DIAGNOSIS — Z11.4 ENCOUNTER FOR SCREENING FOR HIV: ICD-10-CM

## 2019-06-03 PROBLEM — E87.3 METABOLIC ALKALOSIS: Status: RESOLVED | Noted: 2019-05-25 | Resolved: 2019-06-03

## 2019-06-03 PROBLEM — R60.9 EDEMA: Status: RESOLVED | Noted: 2019-05-25 | Resolved: 2019-06-03

## 2019-06-03 LAB — TSH SERPL DL<=0.005 MIU/L-ACNC: 2.38 UIU/ML (ref 0.4–4)

## 2019-06-03 PROCEDURE — 99496 TRANSJ CARE MGMT HIGH F2F 7D: CPT | Mod: PBBFAC,PO | Performed by: INTERNAL MEDICINE

## 2019-06-03 PROCEDURE — 99496 TRANSITIONAL CARE MANAGE SERVICE 7 DAY DISCHARGE: ICD-10-PCS | Mod: S$PBB,,, | Performed by: INTERNAL MEDICINE

## 2019-06-03 PROCEDURE — 99496 TRANSJ CARE MGMT HIGH F2F 7D: CPT | Mod: S$PBB,,, | Performed by: INTERNAL MEDICINE

## 2019-06-03 PROCEDURE — 99999 PR PBB SHADOW E&M-EST. PATIENT-LVL III: CPT | Mod: PBBFAC,,, | Performed by: INTERNAL MEDICINE

## 2019-06-03 PROCEDURE — 99999 PR PBB SHADOW E&M-EST. PATIENT-LVL III: ICD-10-PCS | Mod: PBBFAC,,, | Performed by: INTERNAL MEDICINE

## 2019-06-03 PROCEDURE — 36415 COLL VENOUS BLD VENIPUNCTURE: CPT

## 2019-06-03 PROCEDURE — 84443 ASSAY THYROID STIM HORMONE: CPT

## 2019-06-03 PROCEDURE — 86703 HIV-1/HIV-2 1 RESULT ANTBDY: CPT

## 2019-06-03 PROCEDURE — 99213 OFFICE O/P EST LOW 20 MIN: CPT | Mod: PBBFAC,PO | Performed by: INTERNAL MEDICINE

## 2019-06-03 RX ORDER — LISINOPRIL 20 MG/1
20 TABLET ORAL DAILY
Qty: 30 TABLET | Refills: 11 | Status: SHIPPED | OUTPATIENT
Start: 2019-06-03 | End: 2019-06-18 | Stop reason: ALTCHOICE

## 2019-06-03 RX ORDER — ASPIRIN 81 MG/1
81 TABLET ORAL DAILY
Qty: 90 TABLET | Refills: 3 | Status: ON HOLD | OUTPATIENT
Start: 2019-06-03 | End: 2020-02-13 | Stop reason: SDUPTHER

## 2019-06-03 NOTE — PATIENT INSTRUCTIONS
Left- or Right- Side Congestive Heart Failure (CHF)    The heart is a large muscle. It is a pump that circulates blood throughout the body. Blood carries oxygen to all of the organs, including the brain, muscles, and skin. After your body takes the oxygen out of the blood, the blood returns to the heart. The right side of the heart collects the blood from the body and pumps it to the lungs. In the lungs, it gets fresh oxygen and gives up carbon dioxide. The oxygen-rich blood from the lungs then returns to the left side of the heart, where it is pumped back out to the rest of your body, starting the process all over.  Congestive heart failure (CHF) occurs when the heart muscle is weakened. This affects the pumping action of the heart. Heart failure can affect the right side of the heart or the left side. But heart failure may affect not only the right side of the heart or only the left side. Although it may have started on one side, it can and often eventually does affect both sides.  Right-side heart failure  When the right side of the heart is weakened, it cant handle the blood it is getting from the rest of the body. This blood returns to the heart through veins. When too much pressure builds up in the veins, fluid leaks out into the tissues. Gravity then causes that fluid to move to those parts of the body that are the lowest. So one of the first symptoms of right-side CHF can include swelling in the feet and ankles. If the condition gets worse, the swelling can even go up past the knees. Sometimes it gets so severe, the liver can get congested as well.  Left-side heart failure  When the left side of the heart is weakened, it cant handle the blood it gets from the lungs. Pressure then builds up in the veins of the lungs, causing fluid to leak into the lung tissues. This may cause CHF and pulmonary edema. This causes you to feel short of breath, weak, or dizzy. These symptoms are often worse with exertion,  such as when climbing stairs or walking up hills. Lying with your head flat is uncomfortable and can make your breathing worse. This may make sleeping difficult. You may need to use extra pillows to elevate your upper body to sleep well. The same is true when just resting during the daytime.  There are many causes of heart failure including:  · Coronary artery disease  · Past heart attack (also known as acute myocardial infarction, or AMI)  · High blood pressure  · Damaged heart valve  · Diabetes  · Obesity  · Cigarette smoking  · Alcohol abuse  Heart failure is a chronic condition. There is no cure. The purpose of medical treatment is to improve the pumping action of the heart. The main way to do this is to remove excess water from the body. A number of medicines can help reach this goal, improve symptoms, and prevent the heart from becoming weaker. Sometimes, heart failure can become so severe that a device is placed in the heart to help with pumping. Another major goal is to better treat the causes of heart failure, such as diabetes and high blood pressure, by making changes in your lifestyle and maximizing medical control when needed.  Home care  Follow these guidelines when caring for yourself at home:  · Check your weight every day. This is very important because a sudden increase in weight gain could mean worsening heart failure. Keep these things in mind:  ¨ Use the same scale every day.  ¨ Weigh yourself at the same time every day.  ¨ Make sure the scale is on a hard floor surface, not on a rug or carpet.  ¨ Keep a record of your weight every day so your healthcare provider can see it. If you are not given a log sheet for this, keep a separate journal for this purpose.   · Cut back on the amount of salt (sodium) you eat. Follow your healthcare provider's recommendation on how much salt or sodium you should have each day.  ¨ Avoid high-salt foods. These include olives, pickles, smoked meats, salted potato  chips, and most prepared foods.  ¨ Don't add salt to your food at the table. Use only small amounts of salt when cooking.  ¨ Read the labels carefully on food packages to learn how much salt or sodium is in each serving in the package. Remember, a can or package of food may contain more than 1 serving. So if you eat all the food in the package, you may be getting more salt than you think.  · Follow your healthcare provider's recommendations about how much fluid you should have. Be aware that some foods, such as soup, pudding, and juicy fruits like oranges or melons, contain liquid. You'll need to count the liquid in those foods as part of your daily fluid intake. Your provider can help you with this.  · Stop smoking.  · Cut back on how much alcohol you drink.  · Lose weight if you are overweight. The excess weight adds a lot of stress on the workload of the heart.  · Stay active. Talk with your provider about an exercise program that is safe for your heart.  · Keep your feet elevated to reduce swelling. Ask your provider about support hose as a preventive treatment for daytime leg swelling.  Besides taking your medicine as instructed, an important part of treatment is lifestyle changes. These include diet, physical activity, stopping smoking, and weight control.  Improve your diet by including more fresh foods, cutting back on how much sugar and saturated fat you eat, and eating fewer processed foods and less salt.  Follow-up care  Follow up with your healthcare provider, or as advised.  Make sure to keep any appointments that were made for you. These can help better control your congestive heart failure. You will need to follow up with your provider on a routine basis to make sure your heart failure is well managed.  If an X-ray, ECG, or other tests were done, you will be told of any new findings that may affect your care.  Call 911  Call 911 if you:  · Become severely short of breath  · Feel lightheaded, or feel  like you might pass out or faint  · Have chest pain or discomfort that is different than usual, the medicines your doctor told you to use for this don't help, or the pain lasts longer than 10 to 15 minutes  · You suddenly develop a rapid heart rate  When to seek medical advice  The following may be signs that your heart failure is getting worse. Call your healthcare provider right away if any of these happen:  · Sudden weight gain. This means 3 or more pounds in one day, or 5 or more pounds in 1 week.  · Trouble breathing not related to being active  · New or increased swelling of your legs or ankles  · Swelling or pain in your abdomen  · Breathing trouble at night. This means waking up short of breath or needing more pillows to breathe.  · Frequent coughing that doesnt go away  · Feeling much more tired than usual  Date Last Reviewed: 1/4/2016  © 9579-5969 [employer]. [address] [place] [address]. All rights reserved. This information is not intended as a substitute for professional medical care. Always follow your healthcare professional's instructions.        Low-Salt Choices  Eating salt (sodium) can make your body retain too much water. Excess water makes your heart work harder. Canned, packaged, and frozen foods are easy to prepare, but they are often high in sodium. Here are some ideas for low-salt foods you can easily prepare yourself.    For breakfast  · Fruit or 100% fruit juice  · Whole-wheat bread or an English muffin. Compare sodium content on labels.  · Low-fat milk or yogurt  · Unsalted eggs  · Shredded wheat  · Corn tortillas  · Unsalted steamed rice  · Regular (not instant) hot cereal, made without salt  Stay away from:  · Sausage, hurd, and ham  · Flour tortillas  · Packaged muffins, pancakes, and biscuits  · Instant hot cereals  · Cottage cheese  For lunch and dinner  · Fresh fish, chicken, turkey, or meat--baked, broiled, or roasted without salt  · Dry beans, cooked  without salt  · Tofu, stir-fried without salt  · Unsalted fresh fruit and vegetables, or frozen or canned fruit and vegetables with no added salt  Stay away from:  · Lunch or deli meat that is cured or smoked  · Cheese  · Tomato juice and catsup  · Canned vegetables, soups, and fish not labeled as no-salt-added or reduced sodium  · Packaged gravies and sauces  · Olives, pickles, and relish  · Bottled salad dressings  For snacks and desserts  · Yogurt  · Unsalted, air popped popcorn  · Unsalted nuts or seeds  Stay away from:  · Pies and cakes  · Packaged dessert mixes  · Pizza  · Canned and packaged puddings  · Pretzels, chips, crackers, and nuts--unless the label says unsalted  Date Last Reviewed: 6/17/2015  © 1443-7390 [employer]. [address] [place] [address], [place] [address]. All rights reserved. This information is not intended as a substitute for professional medical care. Always follow your healthcare professional's instructions.

## 2019-06-03 NOTE — PROGRESS NOTES
[facility]  New Visit Progress Note   Recent Hospital Discharge     PRESENTING HISTORY     Chief Complaint/Reason for Admission:  Follow up Hospital Discharge   PCP: Sweetie Riojas MD    History of Present Illness:  Mr. Dima Quintanilla is a 63 y.o. male who was recently admitted to the hospital.    [facility] Hospital Medicine Discharge Summary  Primary Team: [facility] Hospitalist Team B  Attending Physician: Jordin Kincaid MD  Resident: Elmer  Intern: Leoncio  Date of Admit: 2019  Date of Discharge: 2019  Discharge to: Home  Condition: Stable  ___________________________________________________________________    Today:  Presents to [facility] for hospital follow up.  Recently hospitalized for decompensated heart failure.   Heart failure is a new diagnosis.   Underwent LHC on 19- significant for LVEF 10-15%, normal coronary arteries.   Medical management maximized.   Patient discharged to home.    He is accompanied today by family.  Patient is ambulatory and independent with ADL's.  Not taking Lisinopril as this medication has ; also not taking daily aspirin due to miscommunication.     Not monitoring daily weights.  Not following sodium restricted diet.  No significant swelling, dyspnea, or shortness of breath.  No orthopnea; he sleeps comfortably supine on two pillows which is his baseline.     Review of Systems  General ROS: negative for chills, fever or weight loss  Psychological ROS: negative for hallucination, depression or suicidal ideation  Ophthalmic ROS: negative for blurry vision, photophobia or eye pain  ENT ROS: negative for epistaxis, sore throat or rhinorrhea  Respiratory ROS: no cough, shortness of breath, or wheezing  Cardiovascular ROS: no chest pain or dyspnea on exertion  Gastrointestinal ROS: no abdominal pain, change in bowel habits, or black/ bloody stools  Genito-Urinary ROS: no dysuria, trouble voiding, or hematuria  Musculoskeletal ROS: negative for gait disturbance or  muscular weakness  Neurological ROS: no syncope or seizures; no ataxia  Dermatological ROS: negative for pruritis, rash and jaundice      PAST HISTORY:     Past Medical History:   Diagnosis Date    Asthma     COPD (chronic obstructive pulmonary disease)        Past Surgical History:   Procedure Laterality Date    ANGIOGRAM, CORONARY ARTERY N/A 5/28/2019    Performed [facility] at [facility]EP    Left heart cath N/A 5/28/2019    Performed by [facility] at [facility] CATH LAB/EP    Ventriculogram, Left  5/28/2019    Performed by [facility] MD at [facility] CATH LAB/EP       History reviewed. No pertinent family history.      MEDICATIONS & ALLERGIES:     Current Outpatient Medications on File Prior to Visit   Medication Sig Dispense Refill    albuterol (ACCUNEB) 0.63 mg/3 mL Nebu Take 3 mLs (0.63 mg total) by nebulization every 6 (six) hours as needed. 1 vial 5    albuterol (PROAIR HFA) 90 mcg/actuation inhaler Inhale 2 puffs into the lungs every 6 (six) hours as needed for Wheezing or Shortness of Breath (or cough). 18 g 11    budesonide-formoterol 160-4.5 mcg (SYMBICORT) 160-4.5 mcg/actuation HFAA Inhale 2 puffs into the lungs every 12 (twelve) hours. 3 Inhaler 4    carvedilol (COREG) 3.125 MG tablet Take 1 tablet (3.125 mg total) by mouth 2 (two) times daily. 60 tablet 11    fluticasone propionate (FLONASE) 50 mcg/actuation nasal spray spray 2 sprays (100 mcg total) by Each Nare route once daily. 16 g 0    furosemide (LASIX) 20 MG tablet Take 1 tablet (20 mg total) by mouth once daily. 30 tablet 11    aspirin (ECOTRIN) 81 MG EC tablet Take 1 tablet (81 mg total) by mouth once daily.  0    lisinopril (PRINIVIL,ZESTRIL) 20 MG tablet Take 1 tablet (20 mg total) by mouth once daily. 30 tablet 3     No current facility-administered medications on file prior to visit.         Review of patient's allergies indicates:  No Known Allergies    OBJECTIVE:     Vital Signs:  /67 (BP Location:  Left arm, Patient Position: Sitting, BP Method: Small (Automatic))   Pulse 80   Temp 98.3 °F (36.8 °C) (Oral)   Wt 67 kg (147 lb 13.1 oz)   SpO2 95%   BMI 23.86 kg/m²   Wt Readings from Last 1 Encounters:   06/03/19 1042 67 kg (147 lb 13.1 oz)     Body mass index is 23.86 kg/m².   95%    Physical Exam:  /67 (BP Location: Left arm, Patient Position: Sitting, BP Method: Small (Automatic))   Pulse 80   Temp 98.3 °F (36.8 °C) (Oral)   Wt 67 kg (147 lb 13.1 oz)   SpO2 95%   BMI 23.86 kg/m²   General appearance: alert, cooperative, no distress  Constitutional:Oriented to person, place, and time  + appears well-developed and well-nourished.   HEENT: Normocephalic, atraumatic, neck symmetrical, no nasal discharge   Eyes: conjunctivae/corneas clear, PERRL, EOM's intact  Lungs: clear to auscultation bilaterally, no dullness to percussion bilaterally  Heart: regular rate and rhythm without rub; no displacement of the PMI   Abdomen: soft, non-tender; bowel sounds normoactive; no organomegaly  Extremities: extremities symmetric; no clubbing, cyanosis, or edema  Integument: Skin color, texture, turgor normal; no rashes; hair distrubution normal  Neurologic: Alert and oriented X 3, normal strength, normal coordination and gait  Psychiatric: no pressured speech; normal affect; no evidence of impaired cognition     Laboratory  Lab Results   Component Value Date    WBC 6.31 05/28/2019    HGB 14.9 05/28/2019    HCT 46.6 05/28/2019    MCV 84 05/28/2019     05/28/2019     BMP  Lab Results   Component Value Date     05/29/2019    K 4.0 05/29/2019     05/29/2019    CO2 29 05/29/2019    BUN 17 05/29/2019    CREATININE 1.3 05/29/2019    CALCIUM 8.9 05/29/2019    ANIONGAP 8 05/29/2019    ESTGFRAFRICA >60 05/29/2019    EGFRNONAA 58 (A) 05/29/2019     Lab Results   Component Value Date    ALT 34 05/29/2019    AST 27 05/29/2019    ALKPHOS 65 05/29/2019    BILITOT 0.3 05/29/2019     Lab Results   Component  Value Date    INR 1.0 05/28/2019    INR 1.1 05/25/2019     Lab Results   Component Value Date    HGBA1C 5.9 (H) 05/27/2019       Diagnostic Results:  TTE 5/27/19:  · Severely decreased left ventricular systolic function. The estimated ejection fraction is 10%  · Grade III (severe) left ventricular diastolic dysfunction consistent with restrictive physiology.  · Moderate left atrial enlargement.  · Moderate mitral regurgitation.  · Septal wall has abnormal motion consistent with left bundle branch block.  · Severe global hypokinetic wall motion.  · Low normal right ventricular systolic function.  · Normal central venous pressure (3 mm Hg).  · The estimated PA systolic pressure is 27 mm Hg     TRANSITION OF CARE:     [facility] On Call Contact Note: 5/30/19    Family and/or Caretaker present at visit?  Yes.  Diagnostic tests reviewed/disposition: I have reviewed all completed as well as pending diagnostic tests at the time of discharge.  Disease/illness education: Yes  Home health/community services discussion/referrals: Patient does not have home health established from hospital visit.  They do not need home health.  If needed, we will set up home health for the patient.   Establishment or re-establishment of referral orders for community resources: No other necessary community resources.   Discussion with other health care providers: No discussion with other health care providers necessary.     ASSESSMENT & PLAN:       Acute combined systolic and diastolic congestive heart failure  - new diagnosis heart failure  - [facility] without evidence of coronary artery disease  - heart failure education and medication management education provided today  - will see Cardiology team 6/18/19  -     TSH; Future; Expected date: 06/03/2019  -     HIV 1/2 Ag/Ab (4th Gen); Future; Expected date: 06/03/2019  -     lisinopril (PRINIVIL,ZESTRIL) 20 MG tablet; Take 1 tablet (20 mg total) by mouth once daily.  Dispense: 30 tablet; Refill: 11 -      aspirin (ECOTRIN) 81 MG EC tablet; Take 1 tablet (81 mg total) by mouth once daily.  Dispense: 90 tablet; Refill: 3    Encounter for screening for HIV   -     HIV 1/2 Ag/Ab (4th Gen); Future; Expected date: 06/03/2019    I will [facility] in [facility] 6/18/19.     Instructions for the patient:      Scheduled Follow-up :  Future Appointments   Date Time Provider Department Center   6/18/2019  2:00 PM FERNANDA Randle, PAZ [facility] C[facility]   6/24/2019  9:00 AM Monika Santiago MD [facility] IM [facility]       Post Visit Medication List:     Medication List           Accurate as of 6/3/19 12:40 PM. If you have any questions, ask your nurse or doctor.               CONTINUE taking these medications    * albuterol 0.63 mg/3 mL Nebu  Commonly known as:  ACCUNEB  Take 3 mLs (0.63 mg total) by nebulization every 6 (six) hours as needed.     * albuterol 90 mcg/actuation inhaler  Commonly known as:  PROAIR HFA  Inhale 2 puffs into the lungs every 6 (six) hours as needed for Wheezing or Shortness of Breath (or cough).     aspirin 81 MG EC tablet  Commonly known as:  ECOTRIN  Take 1 tablet (81 mg total) by mouth once daily.     budesonide-formoterol 160-4.5 mcg 160-4.5 mcg/actuation Hfaa  Commonly known as:  SYMBICORT  Inhale 2 puffs into the lungs every 12 (twelve) hours.     carvedilol 3.125 MG tablet  Commonly known as:  COREG  Take 1 tablet (3.125 mg total) by mouth 2 (two) times daily.     fluticasone propionate 50 mcg/actuation nasal spray  Commonly known as:  FLONASE  spray 2 sprays (100 mcg total) by Each Nare route once daily.     furosemide 20 MG tablet  Commonly known as:  LASIX  Take 1 tablet (20 mg total) by mouth once daily.     lisinopril 20 MG tablet  Commonly known as:  PRINIVIL,ZESTRIL  Take 1 tablet (20 mg total) by mouth once daily.         * This list has 2 medication(s) that are the same as other medications prescribed for you. Read the directions carefully, and ask your  doctor or other care provider to review them with you.               Where to Get Your Medications      These medications were sent to [facility] - [facility] - [place] - [address]  [address] [place] [address]    Phone:  [phone]   · aspirin 81 MG EC tablet  · lisinopril 20 MG tablet         Signing Physician:  Monika Santiago MD

## 2019-06-04 LAB — HIV 1+2 AB+HIV1 P24 AG SERPL QL IA: NEGATIVE

## 2019-06-05 ENCOUNTER — TELEPHONE (OUTPATIENT)
Dept: PRIMARY CARE CLINIC | Facility: CLINIC | Age: 64
End: 2019-06-05

## 2019-06-18 ENCOUNTER — OFFICE VISIT (OUTPATIENT)
Dept: CARDIOLOGY | Facility: CLINIC | Age: 64
End: 2019-06-18
Payer: MEDICARE

## 2019-06-18 VITALS
HEART RATE: 55 BPM | DIASTOLIC BLOOD PRESSURE: 73 MMHG | HEIGHT: 66 IN | BODY MASS INDEX: 23.84 KG/M2 | OXYGEN SATURATION: 97 % | SYSTOLIC BLOOD PRESSURE: 128 MMHG | WEIGHT: 148.38 LBS

## 2019-06-18 DIAGNOSIS — J44.1 COPD WITH ACUTE EXACERBATION: ICD-10-CM

## 2019-06-18 DIAGNOSIS — I50.42 CHRONIC COMBINED SYSTOLIC AND DIASTOLIC CONGESTIVE HEART FAILURE: Primary | ICD-10-CM

## 2019-06-18 PROCEDURE — 99214 OFFICE O/P EST MOD 30 MIN: CPT | Mod: PBBFAC,PO | Performed by: NURSE PRACTITIONER

## 2019-06-18 PROCEDURE — 99999 PR PBB SHADOW E&M-EST. PATIENT-LVL IV: ICD-10-PCS | Mod: PBBFAC,,, | Performed by: NURSE PRACTITIONER

## 2019-06-18 PROCEDURE — 99999 PR PBB SHADOW E&M-EST. PATIENT-LVL IV: CPT | Mod: PBBFAC,,, | Performed by: NURSE PRACTITIONER

## 2019-06-18 PROCEDURE — 99214 OFFICE O/P EST MOD 30 MIN: CPT | Mod: S$PBB,,, | Performed by: NURSE PRACTITIONER

## 2019-06-18 PROCEDURE — 99214 PR OFFICE/OUTPT VISIT, EST, LEVL IV, 30-39 MIN: ICD-10-PCS | Mod: S$PBB,,, | Performed by: NURSE PRACTITIONER

## 2019-06-18 RX ORDER — LOSARTAN POTASSIUM 25 MG/1
12.5 TABLET ORAL DAILY
Qty: 45 TABLET | Refills: 3 | Status: SHIPPED | OUTPATIENT
Start: 2019-06-18 | End: 2019-09-16 | Stop reason: SDUPTHER

## 2019-06-18 RX ORDER — SILDENAFIL 50 MG/1
50 TABLET, FILM COATED ORAL DAILY PRN
Qty: 10 TABLET | Refills: 0 | Status: SHIPPED | OUTPATIENT
Start: 2019-06-18 | End: 2022-06-17

## 2019-06-18 NOTE — PATIENT INSTRUCTIONS
-Start Losartan 12.5mg by mouth daily    -Continue Aspirin, Carvedilol and Furosemide    -Repeat ultrasound of your heart in late August to check the function to see if your heart is stronger    -Viagra 25mg by mouth as needed; if no response can increase to 50mg

## 2019-06-18 NOTE — PROGRESS NOTES
"Subjective:    Patient ID:  Dima Quintanilla is a 63 y.o. male who presents to clinic for follow up after hospital discharge     64yo male with history of chronic combined systolic and diastolic heart failure, COPD, asthma and anemia who presents to clinic for follow up after hospital discharge.     Today in clinic, he denies any complaints of chest pain or SOB. He reports sleeping 2-3 hours per night and feels he wakes up because of his asthma. He denies chest pain,palpitations, PND or edema. He denies any abdominal distension. He reports normal appetite but states "I cannot eat anything". He reports his wife does the cooking and does not prepare his foods with salt. On the other hand, he reports eating [employer] with no chips             5/25/2019-5/26/2019 Presented with SOB and LE edema. Aggressively diuresed with IV Lasix with good response. Transitioned to oral Lasix. Concerned about CHF-systolic etiology therefore echocardiogram orderd  5/27/2019 On Coreg BID along with daily Lisinopril with transition to oral Lasix BID. 650cc out overnight and negative 4.1 liters since admission. Echocardiogram today with EF 10%   Discussion in regards to concern of severely depressed LVEF and recommend LHC for further evaluation-will plan to proceed tomorrow   5/28/2019 NPO for LHC today  5/29/2019 Underwent LHC yesterday via right radial access with normal coronaries and LVEF 10-15% with LVEDP 8 mmHg. Continued on GDMT. On oral Lasix with 550cc out overnight negative 6.0 liters since admission. Right radial access stable. Will ambulate in hallway             Review of Systems   Constitution: Negative for chills, decreased appetite, diaphoresis and fever.   Cardiovascular: Negative for chest pain, claudication, cyanosis, dyspnea on exertion, irregular heartbeat, leg swelling, near-syncope, orthopnea, palpitations, paroxysmal nocturnal dyspnea and syncope.   Respiratory: Negative for cough, hemoptysis, shortness of breath and " wheezing.    Gastrointestinal: Negative for bloating, abdominal pain, constipation, diarrhea, melena, nausea and vomiting.   Neurological: Negative for dizziness and weakness.        Objective:    Physical Exam   Constitutional: He is oriented to person, place, and time. He appears well-developed and well-nourished. No distress.   Cardiovascular: Normal rate and regular rhythm. Exam reveals no gallop.   No murmur heard.  Pulmonary/Chest: Effort normal and breath sounds normal. No respiratory distress. He has no wheezes.   Abdominal: Soft. Bowel sounds are normal. He exhibits no distension. There is no tenderness.   Neurological: He is alert and oriented to person, place, and time.   Skin: Skin is warm and dry.         LHC 5/28/2019    · S/p right transradial LHC  · Normal coronary arteries.  · EF 10-15% with LVEDP 8 mmHg      Plan:                   Medical therapy for CHF                Low salt diet                Referral to heart failure clinic                Evaluate for ICD after 3 months of maximal medical therapy        TTE 5/27/2019  · Severely decreased left ventricular systolic function. The estimated ejection fraction is 10%  · Grade III (severe) left ventricular diastolic dysfunction consistent with restrictive physiology.  · Moderate left atrial enlargement.  · Moderate mitral regurgitation.  · Septal wall has abnormal motion consistent with left bundle branch block.  · Severe global hypokinetic wall motion.  · Low normal right ventricular systolic function.  · Normal central venous pressure (3 mm Hg).  · The estimated PA systolic pressure is 27 mm Hg    Current Cardiac Medications:     aspirin (ECOTRIN) 81 MG EC tablet, Take 1 tablet (81 mg total) by mouth once daily., Disp: 90 tablet, Rfl: 3    carvedilol (COREG) 3.125 MG tablet, Take 1 tablet (3.125 mg total) by mouth 2 (two) times daily., Disp: 60 tablet, Rfl: 11    furosemide (LASIX) 20 MG tablet, Take 1 tablet (20 mg total) by mouth once  daily., Disp: 30 tablet, Rfl: 11    lisinopril (PRINIVIL,ZESTRIL) 20 MG tablet, Take 1 tablet (20 mg total) by mouth once daily., Disp: 30 tablet, Rfl: 11    Vitals:    06/18/19 1500   BP: 128/73   Pulse: (!) 55       Assessment:       1. Chronic combined systolic and diastolic congestive heart failure    2. COPD         Plan:             -On Coreg and Lasix along with ASA; Lisinopril listed but not currently taking due to ED  -Will try Losartan 12.5mg po daily for optimal control of CHF  -Repeat echo after 8/27/2019 for re evaluation of LVEF  -If LVEF remains less than 35% will need to discuss referral for AICD; if LVEF greater than 35% then will continue current medication regimen  -Continue with strict adherence to low salt diet as well as medication regimen  -RTC after echocardiogram

## 2019-06-24 ENCOUNTER — OFFICE VISIT (OUTPATIENT)
Dept: PRIMARY CARE CLINIC | Facility: CLINIC | Age: 64
End: 2019-06-24
Payer: MEDICARE

## 2019-06-24 VITALS
DIASTOLIC BLOOD PRESSURE: 80 MMHG | WEIGHT: 150.25 LBS | BODY MASS INDEX: 24.25 KG/M2 | HEART RATE: 66 BPM | OXYGEN SATURATION: 96 % | SYSTOLIC BLOOD PRESSURE: 131 MMHG | TEMPERATURE: 97 F

## 2019-06-24 DIAGNOSIS — I50.42 CHRONIC COMBINED SYSTOLIC AND DIASTOLIC CONGESTIVE HEART FAILURE: Primary | ICD-10-CM

## 2019-06-24 PROCEDURE — 99213 OFFICE O/P EST LOW 20 MIN: CPT | Mod: PBBFAC,PO | Performed by: INTERNAL MEDICINE

## 2019-06-24 PROCEDURE — 99213 PR OFFICE/OUTPT VISIT, EST, LEVL III, 20-29 MIN: ICD-10-PCS | Mod: S$PBB,,, | Performed by: INTERNAL MEDICINE

## 2019-06-24 PROCEDURE — 99999 PR PBB SHADOW E&M-EST. PATIENT-LVL III: ICD-10-PCS | Mod: PBBFAC,,, | Performed by: INTERNAL MEDICINE

## 2019-06-24 PROCEDURE — 99999 PR PBB SHADOW E&M-EST. PATIENT-LVL III: CPT | Mod: PBBFAC,,, | Performed by: INTERNAL MEDICINE

## 2019-06-24 PROCEDURE — 99213 OFFICE O/P EST LOW 20 MIN: CPT | Mod: S$PBB,,, | Performed by: INTERNAL MEDICINE

## 2019-06-24 NOTE — PROGRESS NOTES
Subjective:       Patient ID: Dima Quintanilla is a 63 y.o. male.    Chief Complaint: Hospital Follow Up    HPI:  Presents to [facility] for hospital follow up.  Recently hospitalized for decompensated heart failure.   Heart failure is a new diagnosis.   Underwent LHC on 5/28/19- significant for LVEF 10-15%, normal coronary arteries.   Medical management maximized.   Patient discharged to home.    Seen by cardiology, Xin Flores, on 6/18.19.  Lisinopril discontinued, Losartan initiated.  Viagra prescribed prn for erectile dysfunction.  Repeat TTE scheduled for 8/16/19; consideration for AICD once repeat Echo performed.   Patient unaccompanied today.  Ambulatory and independent with ADL's.  He is confused regarding medications.  Did not  the Losartan prescribed by cardiology team.  Tells me he is taking Lisinopril and plans to  Losartan today.  Medications reviewed in detail today and patient counseled extensively.   He continues to have some barriers to following a sodium restricted diet.   Counseling and education provided today.       Review of Systems   Constitutional: Negative for chills and fever.   HENT: Negative for hearing loss.    Respiratory: Negative for cough, shortness of breath and wheezing.    Cardiovascular: Negative for chest pain, claudication, leg swelling and PND.   Gastrointestinal: Negative for abdominal pain, heartburn, nausea and vomiting.   Genitourinary: Negative for dysuria.        + erectile dysfunction     Musculoskeletal: Negative for falls.   Skin: Negative for rash.   Neurological: Negative for dizziness.   Endo/Heme/Allergies: Does not bruise/bleed easily.   Psychiatric/Behavioral: Positive for memory loss. Negative for depression. The patient is not nervous/anxious and does not have insomnia.            Objective:      Vital Signs:  Vitals:    06/24/19 0914   BP: 131/80   Pulse: 66   Temp: 97.1 °F (36.2 °C)     Wt Readings from Last 1 Encounters:   06/18/19 1500  67.3 kg (148 lb 5.9 oz)     Body mass index is 24.25 kg/m².   SpO2 Readings from Last 1 Encounters:   06/18/19 97%       Physical Exam   Constitutional: He is oriented to person, place, and time. He appears well-developed and well-nourished. No distress.   HENT:   Head: Normocephalic.   Eyes: Pupils are equal, round, and reactive to light. EOM are normal.   Neck: Normal range of motion. No JVD present.   Cardiovascular: Normal rate and regular rhythm.   No murmur heard.  Pulmonary/Chest: Effort normal and breath sounds normal. No stridor. No respiratory distress. He has no wheezes.   Abdominal: Soft. Bowel sounds are normal.   Musculoskeletal: Normal range of motion. He exhibits no edema.   Neurological: He is alert and oriented to person, place, and time.   Skin: Skin is warm and dry. No rash noted.   Psychiatric: He has a normal mood and affect.     Results for NATHALIE DE LA CRUZ (MRN [id number]) as of 6/24/2019 09:32   Ref. Range 6/3/2019 11:56   TSH Latest Ref Range: 0.400 - 4.000 uIU/mL 2.383       Results for NATHALIE DE LA CRUZ (MRN [id number]) as of 6/24/2019 09:32   Ref. Range 6/3/2019 11:56   HIV 1/2 Ag/Ab Latest Ref Range: Negative  Negative   Results for NATHALIE DE LA CRUZ (MRN [id number]) as of 6/24/2019 09:32   Ref. Range 5/27/2019 04:40   Hemoglobin A1C External Latest Ref Range: 4.0 - 5.6 % 5.9 (H)     Assessment:       1. Chronic combined systolic and diastolic congestive heart failure        Plan:       Diagnoses and all orders for this visit:    Chronic combined systolic and diastolic congestive heart failure  - new diagnosis heart failure  - [facility] [place] without evidence of coronary artery disease  - heart failure education and medication management education provided today  - seen by Cardiology team 6/18/19, repeat TTE planned for 8/16/19 with further discussion of AICD placement based on results of repeat Echo        Patient will be released from [facility].  He will resume care with his PCP, Dr Riojas.  His is  instructed to see Dr Riojas in 6-8 weeks.     Scheduled Follow-up :  Future Appointments   Date Time Provider Department Center   8/16/2019 10:00 AM ECHO/[facility]       Post Visit Medication List:     Medication List           Accurate as of 6/24/19  9:34 AM. If you have any questions, ask your nurse or doctor.               CONTINUE taking these medications    * albuterol 0.63 mg/3 mL Nebu  Commonly known as:  ACCUNEB  Take 3 mLs (0.63 mg total) by nebulization every 6 (six) hours as needed.     * albuterol 90 mcg/actuation inhaler  Commonly known as:  PROAIR HFA  Inhale 2 puffs into the lungs every 6 (six) hours as needed for Wheezing or Shortness of Breath (or cough).     aspirin 81 MG EC tablet  Commonly known as:  ECOTRIN  Take 1 tablet (81 mg total) by mouth once daily.     budesonide-formoterol 160-4.5 mcg 160-4.5 mcg/actuation Hfaa  Commonly known as:  SYMBICORT  Inhale 2 puffs into the lungs every 12 (twelve) hours.     carvedilol 3.125 MG tablet  Commonly known as:  COREG  Take 1 tablet (3.125 mg total) by mouth 2 (two) times daily.     fluticasone propionate 50 mcg/actuation nasal spray  Commonly known as:  FLONASE  spray 2 sprays (100 mcg total) by Each Nare route once daily.     furosemide 20 MG tablet  Commonly known as:  LASIX  Take 1 tablet (20 mg total) by mouth once daily.     losartan 25 MG tablet  Commonly known as:  COZAAR  Take 0.5 tablets (12.5 mg total) by mouth once daily.     sildenafil 50 MG tablet  Commonly known as:  VIAGRA  Take 1 tablet (50 mg total) by mouth daily as needed for Erectile Dysfunction.         * This list has 2 medication(s) that are the same as other medications prescribed for you. Read the directions carefully, and ask your doctor or other care provider to review them with you.

## 2019-08-08 ENCOUNTER — HOSPITAL ENCOUNTER (EMERGENCY)
Facility: HOSPITAL | Age: 64
Discharge: HOME | End: 2019-08-08
Attending: EMERGENCY MEDICINE
Payer: MEDICARE

## 2019-08-08 VITALS
RESPIRATION RATE: 16 BRPM | OXYGEN SATURATION: 97 % | HEIGHT: 66 IN | WEIGHT: 148.38 LBS | HEART RATE: 94 BPM | BODY MASS INDEX: 23.84 KG/M2 | SYSTOLIC BLOOD PRESSURE: 140 MMHG | TEMPERATURE: 98 F | DIASTOLIC BLOOD PRESSURE: 90 MMHG

## 2019-08-08 DIAGNOSIS — R06.02 SOB (SHORTNESS OF BREATH): ICD-10-CM

## 2019-08-08 DIAGNOSIS — E87.70 HYPERVOLEMIA, UNSPECIFIED HYPERVOLEMIA TYPE: Primary | ICD-10-CM

## 2019-08-08 DIAGNOSIS — R06.02 SHORTNESS OF BREATH: ICD-10-CM

## 2019-08-08 LAB
ALBUMIN SERPL BCP-MCNC: 3.8 G/DL (ref 3.5–5.2)
ALP SERPL-CCNC: 88 U/L (ref 55–135)
ALT SERPL W/O P-5'-P-CCNC: 31 U/L (ref 10–44)
ANION GAP SERPL CALC-SCNC: 9 MMOL/L (ref 8–16)
AST SERPL-CCNC: 29 U/L (ref 10–40)
BASOPHILS # BLD AUTO: 0.02 K/UL (ref 0–0.2)
BASOPHILS NFR BLD: 0.3 % (ref 0–1.9)
BILIRUB SERPL-MCNC: 0.7 MG/DL (ref 0.1–1)
BNP SERPL-MCNC: 1747 PG/ML (ref 0–99)
BUN SERPL-MCNC: 18 MG/DL (ref 8–23)
CALCIUM SERPL-MCNC: 9.6 MG/DL (ref 8.7–10.5)
CHLORIDE SERPL-SCNC: 105 MMOL/L (ref 95–110)
CK SERPL-CCNC: 99 U/L (ref 20–200)
CO2 SERPL-SCNC: 28 MMOL/L (ref 23–29)
CREAT SERPL-MCNC: 1.5 MG/DL (ref 0.5–1.4)
DIFFERENTIAL METHOD: ABNORMAL
EOSINOPHIL # BLD AUTO: 0.8 K/UL (ref 0–0.5)
EOSINOPHIL NFR BLD: 14 % (ref 0–8)
ERYTHROCYTE [DISTWIDTH] IN BLOOD BY AUTOMATED COUNT: 16.4 % (ref 11.5–14.5)
EST. GFR  (AFRICAN AMERICAN): 57 ML/MIN/1.73 M^2
EST. GFR  (NON AFRICAN AMERICAN): 49 ML/MIN/1.73 M^2
GLUCOSE SERPL-MCNC: 84 MG/DL (ref 70–110)
HCT VFR BLD AUTO: 50.1 % (ref 40–54)
HGB BLD-MCNC: 15.8 G/DL (ref 14–18)
INR PPP: 1 (ref 0.8–1.2)
LYMPHOCYTES # BLD AUTO: 1.6 K/UL (ref 1–4.8)
LYMPHOCYTES NFR BLD: 26.9 % (ref 18–48)
MAGNESIUM SERPL-MCNC: 2.3 MG/DL (ref 1.6–2.6)
MCH RBC QN AUTO: 26.7 PG (ref 27–31)
MCHC RBC AUTO-ENTMCNC: 31.5 G/DL (ref 32–36)
MCV RBC AUTO: 85 FL (ref 82–98)
MONOCYTES # BLD AUTO: 0.3 K/UL (ref 0.3–1)
MONOCYTES NFR BLD: 5.3 % (ref 4–15)
NEUTROPHILS # BLD AUTO: 3.1 K/UL (ref 1.8–7.7)
NEUTROPHILS NFR BLD: 53.5 % (ref 38–73)
PLATELET # BLD AUTO: 173 K/UL (ref 150–350)
PLATELET BLD QL SMEAR: ABNORMAL
PMV BLD AUTO: ABNORMAL FL (ref 9.2–12.9)
POTASSIUM SERPL-SCNC: 4.6 MMOL/L (ref 3.5–5.1)
PROT SERPL-MCNC: 7.1 G/DL (ref 6–8.4)
PROTHROMBIN TIME: 10.5 SEC (ref 9–12.5)
RBC # BLD AUTO: 5.92 M/UL (ref 4.6–6.2)
SODIUM SERPL-SCNC: 142 MMOL/L (ref 136–145)
TROPONIN I SERPL DL<=0.01 NG/ML-MCNC: 0.02 NG/ML (ref 0–0.03)
WBC # BLD AUTO: 5.8 K/UL (ref 3.9–12.7)

## 2019-08-08 PROCEDURE — 93010 ELECTROCARDIOGRAM REPORT: CPT | Mod: ,,, | Performed by: INTERNAL MEDICINE

## 2019-08-08 PROCEDURE — 96374 THER/PROPH/DIAG INJ IV PUSH: CPT

## 2019-08-08 PROCEDURE — 93005 ELECTROCARDIOGRAM TRACING: CPT

## 2019-08-08 PROCEDURE — 63600175 PHARM REV CODE 636 W HCPCS: Performed by: PHYSICIAN ASSISTANT

## 2019-08-08 PROCEDURE — 83880 ASSAY OF NATRIURETIC PEPTIDE: CPT

## 2019-08-08 PROCEDURE — 93010 EKG 12-LEAD: ICD-10-PCS | Mod: ,,, | Performed by: INTERNAL MEDICINE

## 2019-08-08 PROCEDURE — 85610 PROTHROMBIN TIME: CPT

## 2019-08-08 PROCEDURE — 83735 ASSAY OF MAGNESIUM: CPT

## 2019-08-08 PROCEDURE — 80053 COMPREHEN METABOLIC PANEL: CPT

## 2019-08-08 PROCEDURE — 85025 COMPLETE CBC W/AUTO DIFF WBC: CPT

## 2019-08-08 PROCEDURE — 99285 EMERGENCY DEPT VISIT HI MDM: CPT | Mod: 25

## 2019-08-08 PROCEDURE — 84484 ASSAY OF TROPONIN QUANT: CPT

## 2019-08-08 PROCEDURE — 82550 ASSAY OF CK (CPK): CPT

## 2019-08-08 RX ORDER — FUROSEMIDE 10 MG/ML
40 INJECTION INTRAMUSCULAR; INTRAVENOUS
Status: COMPLETED | OUTPATIENT
Start: 2019-08-08 | End: 2019-08-08

## 2019-08-08 RX ADMIN — FUROSEMIDE 40 MG: 10 INJECTION, SOLUTION INTRAVENOUS at 01:08

## 2019-08-08 NOTE — ED PROVIDER NOTES
Encounter Date: 8/8/2019       History     Chief Complaint   Patient presents with    Shortness of Breath     Reports over the past 2 days has been having pain to david legs and increased SOB.      Afebrile 63-year-old male with PMH of asthma, COPD and CHF presents the ED for evaluation of shortness of breath. Patient states over the past 2 days he has had worsening bilateral lower leg edema and shortness of breath. He states that this is exacerbated by exertion.  He states that is relieved with rest.  He does state that he sleeps in a recliner however this is not new for him.  He does also report some generalized arthralgias and myalgias. He denies any other symptoms at this time including fever, chills, cough, hemoptysis, nausea, vomiting, chest pain, palpitations, dizziness, numbness or tingling.  He states that he is take his his regular medications with modest improvement symptoms. He does state that he does daily duo nebs and completed this with no significant change in symptoms today.  He does report some increased nasal congestion however states he did not use is typical nasal spray today.     The history is provided by the patient.     Review of patient's allergies indicates:  No Known Allergies  Past Medical History:   Diagnosis Date    Asthma     COPD (chronic obstructive pulmonary disease)      Past Surgical History:   Procedure Laterality Date    ANGIOGRAM, CORONARY ARTERY N/A 5/28/2019    Performed by [facility]unj [facility] at [facility] CATH LAB/EP    Left heart cath N/A 5/28/2019    Performed by [facility] at [facility] CATH LAB/EP    Ventriculogram, Left  5/28/2019    Performed by Nikunj [facility]abon MD at [facility] CATH LAB/EP     History reviewed. No pertinent family history.  Social History     Tobacco Use    Smoking status: Never Smoker    Smokeless tobacco: Never Used   Substance Use Topics    Alcohol use: Yes     Alcohol/week: 5.4 oz     Types: 9 Cans of beer per week     Comment: Stated he drinks on  Friday, Saturday, & Sunday.     Drug use: No     Review of Systems   Constitutional: Negative for chills and fever.   HENT: Positive for congestion. Negative for sore throat.    Respiratory: Positive for shortness of breath. Negative for cough and chest tightness.    Cardiovascular: Positive for leg swelling. Negative for chest pain and palpitations.   Gastrointestinal: Negative for abdominal pain, nausea and vomiting.   Genitourinary: Negative for dysuria.   Musculoskeletal: Positive for arthralgias and myalgias. Negative for back pain.   Skin: Negative for rash.   Neurological: Negative for dizziness, weakness, light-headedness and headaches.   Hematological: Does not bruise/bleed easily.       Physical Exam     Initial Vitals [08/08/19 0952]   BP Pulse Resp Temp SpO2   (!) 136/91 86 (!) 22 97.9 °F (36.6 °C) 95 %      MAP       --         Vitals:    08/08/19 1408   BP: (!) 140/90   Pulse: 94   Resp: 16   Temp: 98.3 °F (36.8 °C)       Physical Exam    Nursing note and vitals reviewed.  Constitutional: He appears well-developed and well-nourished. He is cooperative.  Non-toxic appearance. He does not appear ill. No distress.   HENT:   Head: Normocephalic and atraumatic.   Nose: Mucosal edema present.   Eyes: Conjunctivae and lids are normal.   Neck: Trachea normal and normal range of motion. Neck supple. No stridor present. No tracheal deviation present.   Cardiovascular: Normal rate and regular rhythm.   Pulmonary/Chest: No accessory muscle usage. No tachypnea. No respiratory distress. He has no decreased breath sounds. He has no wheezes. He has no rales.   Abdominal: Soft. Normal appearance.   Neurological: He is alert and oriented to person, place, and time. GCS eye subscore is 4. GCS verbal subscore is 5. GCS motor subscore is 6.   Skin: Skin is warm, dry and intact. No rash noted.   Psychiatric: He has a normal mood and affect. His speech is normal and behavior is normal. Thought content normal.         ED  Course   Procedures  Labs Reviewed   CBC W/ AUTO DIFFERENTIAL - Abnormal; Notable for the following components:       Result Value    Mean Corpuscular Hemoglobin 26.7 (*)     Mean Corpuscular Hemoglobin Conc 31.5 (*)     RDW 16.4 (*)     Eos # 0.8 (*)     Eosinophil% 14.0 (*)     All other components within normal limits   COMPREHENSIVE METABOLIC PANEL - Abnormal; Notable for the following components:    Creatinine 1.5 (*)     eGFR if  57 (*)     eGFR if non  49 (*)     All other components within normal limits   B-TYPE NATRIURETIC PEPTIDE - Abnormal; Notable for the following components:    BNP 1,747 (*)     All other components within normal limits   TROPONIN I   PROTIME-INR   MAGNESIUM   CK          Imaging Results          X-Ray Chest AP Portable (Final result)  Result time 08/08/19 12:05:05    Final result by Mariano Brown MD (08/08/19 12:05:05)                 Impression:      No active process or acute change.      Electronically signed by: Mariano Brown MD  Date:    08/08/2019  Time:    12:05             Narrative:    EXAMINATION:  XR CHEST AP PORTABLE    CLINICAL HISTORY:  sob;    TECHNIQUE:  Single frontal view of the chest was performed.    COMPARISON:  05/25/2019    FINDINGS:  Mild cardiomegaly.  Lungs clear.  Small bilateral cervical ribs.                                 Medical Decision Making:   Initial Assessment:   63-year-old male presents the ED for evaluation of worsening shortness of breath. He states that symptoms are similar to previous CHF exacerbation.  He does report medication compliance.  He denies any recent intake of salty foods.  He denies any fever chills. Patient is well-appearing in no significant distress.  He is able to speak in long complete sentences with no signs of respiratory distress. Mucous membranes are moist; nasal congestion appreciated.  Oropharynx is clear.  Lungs CTA; heart regular rate and rhythm. Abdomen is soft and  nontender. 1+ edema to bilateral lower extremities; no calf tenderness, erythema or warmth.  Neurovascularly intact.  Differential Diagnosis:   Differential Diagnosis includes, but is not limited to:  PE, MI/ACS, pneumothorax, pericardial effusion/tamonade, pneumonia, lung abscess, pericarditis/myocarditis, pleural effusion, lung mass, CHF exacerbation, asthma exacerbation, COPD exacerbation, aspirated/ingested foreign body, airway obstruction, CO poisoning, anemia, metabolic derangement, allergy/atopy, influenza, viral URI, viral syndrome.    Clinical Tests:   Lab Tests: Ordered and Reviewed  Radiological Study: Ordered and Reviewed  Medical Tests: Ordered and Reviewed  ED Management:  EKG with no acute STEMI or abnormalities at this time.  Chest x-ray reveals some mild cardiomegaly; no infiltrate appreciated.  Lab significant for BNP elevated at 1747 this in combination with mild cardiomegaly concerning for hypervolemia.  Patient is diuresing well and was given 1 dose of IV Lasix in the ED.  Remaining labs grossly unremarkable for any acute abnormalities.  Do believe patient would benefit from a increase in Lasix over the next few days with stricture dietary intake.  Patient did complete walking trial in the ED with no complaints of shortness of breath and no hypoxia. Strict instructions to follow up with primary care physician or reference provided for further assessment and evaluation. Given instructions to return for any acute symptoms and verbalized understanding of this medical plan.\                        Clinical Impression:       ICD-10-CM ICD-9-CM   1. Hypervolemia, unspecified hypervolemia type E87.70 276.69   2. SOB (shortness of breath) R06.02 786.05   3. Shortness of breath R06.02 786.05                                KARINA Barajas  08/08/19 2005     n/a

## 2019-08-08 NOTE — ED TRIAGE NOTES
Pt presents to ED complaining of pain to bilateral extremities for the past 2 days. Pt also endorses some slight increased SOB on exertion. Pt states he has been taking his lasix as prescribed. Nadn. Pt rr even and unlabored on ra. Pt denies cp, or any other complaints at this time

## 2019-08-08 NOTE — DISCHARGE INSTRUCTIONS
You should increase her Lasix to 20 mg twice a day for the next 3 days.  You should follow up with your cardiologist or primary care physician for further evaluation.  Please return to the ED should any symptoms worsen or new symptoms began.

## 2019-08-16 ENCOUNTER — HOSPITAL ENCOUNTER (OUTPATIENT)
Dept: CARDIOLOGY | Facility: HOSPITAL | Age: 64
Discharge: HOME | End: 2019-08-16
Attending: NURSE PRACTITIONER
Payer: MEDICARE

## 2019-08-16 DIAGNOSIS — I50.42 CHRONIC COMBINED SYSTOLIC AND DIASTOLIC CONGESTIVE HEART FAILURE: ICD-10-CM

## 2019-08-16 LAB
AORTIC ROOT ANNULUS: 2.81 CM
AORTIC VALVE CUSP SEPERATION: 1.98 CM
AV INDEX (PROSTH): 0.92
AV MEAN GRADIENT: 2 MMHG
AV PEAK GRADIENT: 3 MMHG
AV VALVE AREA: 2.78 CM2
AV VELOCITY RATIO: 0.89
CV ECHO LV RWT: 0.38 CM
DOP CALC AO PEAK VEL: 0.89 M/S
DOP CALC AO VTI: 13.19 CM
DOP CALC LVOT AREA: 3 CM2
DOP CALC LVOT DIAMETER: 1.96 CM
DOP CALC LVOT PEAK VEL: 0.79 M/S
DOP CALC LVOT STROKE VOLUME: 36.7 CM3
DOP CALC MV VTI: 19 CM
DOP CALCLVOT PEAK VEL VTI: 12.17 CM
ECHO LV POSTERIOR WALL: 1.08 CM (ref 0.6–1.1)
FRACTIONAL SHORTENING: 6 % (ref 28–44)
INTERVENTRICULAR SEPTUM: 1.02 CM (ref 0.6–1.1)
IVC PROX: 1.6 CM
LA MAJOR: 5.67 CM
LA MINOR: 5.95 CM
LA WIDTH: 5 CM
LEFT ATRIUM SIZE: 3.3 CM
LEFT ATRIUM VOLUME: 81.44 CM3
LEFT INTERNAL DIMENSION IN SYSTOLE: 5.32 CM (ref 2.1–4)
LEFT VENTRICLE DIASTOLIC VOLUME: 156.68 ML
LEFT VENTRICLE SYSTOLIC VOLUME: 136.6 ML
LEFT VENTRICULAR INTERNAL DIMENSION IN DIASTOLE: 5.65 CM (ref 3.5–6)
LEFT VENTRICULAR MASS: 237.82 G
LV LATERAL E/E' RATIO: 11.63 M/S
MV PEAK E VEL: 0.93 M/S
MV VALVE AREA BY CONTINUITY EQUATION: 1.93 CM2
PISA TR MAX VEL: 2.94 M/S
PULM VEIN S/D RATIO: 0.5
PV PEAK D VEL: 0.64 M/S
PV PEAK S VEL: 0.32 M/S
PV PEAK VELOCITY: 0.74 CM/S
RA MAJOR: 4.83 CM
RA PRESSURE: 3 MMHG
RA WIDTH: 5.41 CM
RIGHT VENTRICULAR END-DIASTOLIC DIMENSION: 2.36 CM
SINUS: 2.49 CM
TDI LATERAL: 0.08 M/S
TR MAX PG: 35 MMHG
TRICUSPID ANNULAR PLANE SYSTOLIC EXCURSION: 1.95 CM
TV REST PULMONARY ARTERY PRESSURE: 38 MMHG

## 2019-08-16 PROCEDURE — 93306 TTE W/DOPPLER COMPLETE: CPT | Mod: PO

## 2019-08-16 PROCEDURE — 93306 TTE W/DOPPLER COMPLETE: CPT | Mod: 26,,, | Performed by: INTERNAL MEDICINE

## 2019-08-16 PROCEDURE — 93306 TRANSTHORACIC ECHO (TTE) COMPLETE (CUPID ONLY): ICD-10-PCS | Mod: 26,,, | Performed by: INTERNAL MEDICINE

## 2019-08-21 ENCOUNTER — TELEPHONE (OUTPATIENT)
Dept: CARDIOLOGY | Facility: CLINIC | Age: 64
End: 2019-08-21

## 2019-08-21 NOTE — TELEPHONE ENCOUNTER
----- Message from FERNANDA Randle, PAZ sent at 8/19/2019 12:18 PM CDT -----  Ultrasound of his heart still shows his heart muscle is weak.He should continue his current medication regimen. He needs to see EP either Dr. Gutierrez or Dr. Townsend for discussion for AICD placement    Thanks  BH

## 2019-08-23 ENCOUNTER — OFFICE VISIT (OUTPATIENT)
Dept: CARDIOLOGY | Facility: CLINIC | Age: 64
End: 2019-08-23
Payer: MEDICARE

## 2019-08-23 DIAGNOSIS — I44.7 LBBB (LEFT BUNDLE BRANCH BLOCK): Chronic | ICD-10-CM

## 2019-08-23 DIAGNOSIS — I49.8 OTHER SPECIFIED CARDIAC ARRHYTHMIAS: ICD-10-CM

## 2019-08-23 DIAGNOSIS — I50.42 CHRONIC COMBINED SYSTOLIC AND DIASTOLIC CONGESTIVE HEART FAILURE: Primary | ICD-10-CM

## 2019-08-23 DIAGNOSIS — I10 ESSENTIAL HYPERTENSION: ICD-10-CM

## 2019-08-23 PROCEDURE — 99999 PR PBB SHADOW E&M-EST. PATIENT-LVL I: CPT | Mod: PBBFAC,,, | Performed by: INTERNAL MEDICINE

## 2019-08-23 PROCEDURE — 99211 OFF/OP EST MAY X REQ PHY/QHP: CPT | Mod: PBBFAC,PO | Performed by: INTERNAL MEDICINE

## 2019-08-23 PROCEDURE — 93005 ELECTROCARDIOGRAM TRACING: CPT | Mod: PBBFAC,PO | Performed by: STUDENT IN AN ORGANIZED HEALTH CARE EDUCATION/TRAINING PROGRAM

## 2019-08-23 PROCEDURE — 93010 ELECTROCARDIOGRAM REPORT: CPT | Mod: S$PBB,,, | Performed by: STUDENT IN AN ORGANIZED HEALTH CARE EDUCATION/TRAINING PROGRAM

## 2019-08-23 PROCEDURE — 99205 OFFICE O/P NEW HI 60 MIN: CPT | Mod: S$PBB,,, | Performed by: INTERNAL MEDICINE

## 2019-08-23 PROCEDURE — 93010 RHYTHM STRIP: ICD-10-PCS | Mod: S$PBB,,, | Performed by: STUDENT IN AN ORGANIZED HEALTH CARE EDUCATION/TRAINING PROGRAM

## 2019-08-23 PROCEDURE — 99205 PR OFFICE/OUTPT VISIT, NEW, LEVL V, 60-74 MIN: ICD-10-PCS | Mod: S$PBB,,, | Performed by: INTERNAL MEDICINE

## 2019-08-23 PROCEDURE — 99999 PR PBB SHADOW E&M-EST. PATIENT-LVL I: ICD-10-PCS | Mod: PBBFAC,,, | Performed by: INTERNAL MEDICINE

## 2019-08-23 NOTE — PROGRESS NOTES
Subjective:    Patient ID:  Dima Quintanilla is a 63 y.o. male who presents for evaluation of Congestive Heart Failure and LBBB    Referring Cardiac Practitioner: Xni Flores NP  Primary Care Physician: Sweetie Riojas MD    HPI  I had the pleasure of seeing Mr. Quintanilla today in our electrophysiology clinic in consultation for his sudden cardiac death risk from his systolic cardiomyopathy. As you are aware he is a pleasant 63 year-old man nonischemic dilated systolic cardiomyopathy (LVEF 10% 5/2019, normal coronary arteries on angiography), LBBB, hypertension and COPD. He presented to [facility] on 5/25/2019 with symptoms of congestive heart failure. He was initiated on diuretic and beta-blocker therapy. He was chronically taking lisinopril. He presented again to the ER on 8/8/2019 with shortness of breath and was given IV lasix with symptomatic improvement. A repeat ECHO on 8/16/2019 noted LVEF of 15%.     I reviewed available ECGs in Epic which note sinus rhythm with LBBB (QRS ~150ms)    My interpretation of today's in clinic ECG is sinus rhythm with LBBB    Review of Systems   Constitution: Negative for fever and malaise/fatigue.   HENT: Negative for congestion and sore throat.    Eyes: Negative for blurred vision and visual disturbance.   Cardiovascular: Positive for dyspnea on exertion and leg swelling. Negative for chest pain, irregular heartbeat, near-syncope, orthopnea, palpitations, paroxysmal nocturnal dyspnea and syncope.   Respiratory: Negative for cough and shortness of breath.    Hematologic/Lymphatic: Negative for bleeding problem. Does not bruise/bleed easily.   Skin: Negative.    Musculoskeletal: Negative.    Neurological: Negative for dizziness and focal weakness.        Objective:    Physical Exam   Constitutional: He is oriented to person, place, and time. He appears well-developed and well-nourished. No distress.   HENT:   Head: Normocephalic and atraumatic.   Eyes: Conjunctivae are normal.  Right eye exhibits no discharge. Left eye exhibits no discharge.   Neck: Neck supple. No JVD present.   Cardiovascular: Normal rate and regular rhythm. Exam reveals no gallop and no friction rub.   No murmur heard.  Pulmonary/Chest: Effort normal and breath sounds normal. No respiratory distress. He has no wheezes. He has no rales.   Abdominal: Soft. Bowel sounds are normal. He exhibits no distension. There is no tenderness. There is no rebound.   Musculoskeletal: He exhibits no edema.   Neurological: He is alert and oriented to person, place, and time.   Skin: Skin is warm and dry. He is not diaphoretic.   Psychiatric: He has a normal mood and affect. His behavior is normal. Judgment and thought content normal.   Vitals reviewed.        Assessment:       1. Chronic combined systolic and diastolic congestive heart failure    2. LBBB (left bundle branch block)    3. Essential hypertension         Plan:     In summary, Mr. Quintanilla is a pleasant 63 year-old man nonischemic dilated systolic cardiomyopathy (LVEF 10% 5/2019, normal coronary arteries on angiography), LBBB, hypertension and COPD. His EF remains severely depressed despite 3 months of goal directed medical therapy (ECHO done day 83 post initiation of beta-blocker therapy). Discussed the etiology and pathophysiology of sudden cardiac death in a patient population such as his and how an ICD may provide mortality benefit. I also discussed how a LBBB may impact LV systolic function and how cardiac resynchronization therapy may provide both morbidity and mortality benefit. We discussed the long-term implications of having an implanted cardiac electronic device including infection, inappropriate shocks, venous occlusion, and device malfunction requiring further procedures. We discussed the alternatives, benefits and risks of the procedure including pain, infection, bleeding, injury to lung causing pneumothorax requiring tube placement, injury to heart valves, puncture  of the heart leading to pericardial effusion or tamponade requiring tube drainage, heart attack, stroke and death. I recommended CRT-D implantation. The patient understood and desires to proceed. He had no further questions regarding the planned procedure.     Will unfortunately need an updated ECHO past 8/23/2019 for insurance approval (8/23/2019 will be 90 days from initiation of GDMT).    Plan  Limited ECHO  CRT-D implantation, SJM  Can be done at [place] [facility] or [facility]  Anesthesia for sedation  Hold lisinopril day of procedure    Thank you for allowing me to participate in the care of this patient. Please do not hesitate to call me with any questions or concerns.    .Yaniv Townsend MD, PhD  Cardiac Electrophysiology

## 2019-08-23 NOTE — LETTER
August 23, 2019      FERNANDA Randle, ANP  [address]  [address]  [facility] [address]           [place] - Arrhythmia  [address]  [facility] [address]  Phone: [phone]          Patient: Dima Quintanilla   MR Number: [id number]   YOB: 1955   Date of Visit: 8/23/2019       Dear iXn Flores:    Thank you for referring Dima Quintanilla to me for evaluation. Attached you will find relevant portions of my assessment and plan of care.    If you have questions, please do not hesitate to call me. I look forward to following Dima Quintanilla along with you.    Sincerely,    Yaniv Townsend MD    Enclosure  CC:  No Recipients    If you would like to receive this communication electronically, please contact [email or web address] or [phone] to request more information on Hotreader Link access.    For providers and/or their staff who would like to refer a patient to [facility], please contact us through our one-stop-shop provider referral line, [facility] , at [phone].    If you feel you have received this communication in error or would no longer like to receive these types of communications, please e-mail [email or web address]

## 2019-08-23 NOTE — PATIENT INSTRUCTIONS
Biventricular Pacemaker and ICD (Biventricular ICD)     You have a condition called heart failure. It is also known as congestive heart failure (CHF). This condition causes symptoms such as getting tired very quickly and being short of breath. To help treat these symptoms, your healthcare provider is recommending a biventricular pacemaker and implantable cardioverter defibrillator (ICD). This is sometimes called a biventricular ICD. Or it is called cardiac resynchronization pacing with an ICD (CRT-D).  A biventricular pacemaker and ICD is a small, lightweight device powered by batteries. This device helps keep your heart pumping normally. It also protects you from dangerous heart rhythms. Read on to learn more about this device and how it works.  Understanding the heart  The heart is made up of 4 sections (chambers) that pump to move blood through the heart. The top 2 chambers are the left atrium and right atrium. These are filling chambers of the heart. The bottom chambers are the left ventricle and right ventricle. These are the pumping chambers of the heart. The heart has an electrical system. This system sends signals that make the atria and ventricles work together. This causes the heart to beat and move blood through the heart and lungs and out to the body.  How the device works  Heart failure weakens your heart muscle. As a result, the ventricles dont pump as strongly as they should.  The pathways that carry the heart's electrical signals are located in the heart muscle. They can also be damaged by CHF. This can cause a bundle branch block. A bundle branch block can throw off the timing of the heart's contraction. This can make the heart's squeezing contraction even weaker.  A biventricular pacemaker and ICD help keep the heart pumping in a more normal way. The pacemaker device keeps the heart from beating too slowly. It tries to restore the normal squeezing pattern of the heart. This is called  "resynchronization pacing. This can lead to more efficient and stronger heart contraction. The ICD part of the device detects dangerously fast heart rhythms and stops them. If the device detects an abnormally fast heartbeat that can cause cardiac arrest, it will send a "shock" to the heart. The shock stops this dangerous heart rhythm and restores a normal heartbeat.  Before the procedure  Make sure to:  · Not eat or drink after midnight or 8 hours before your surgery  · Follow instructions from your doctor about bathing the night before and the morning of your procedure. You may need to use a special cleaning solution.  · Tell your doctor what medicines you take. This includes over-the-counter medicines such as ibuprofen. It also includes vitamins, herbs, and other supplements. You may need to stop taking some medicines before the procedure, such as blood thinners and aspirin.  · Tell your doctor if you are sensitive or allergic to anything. This includes medicines, latex, tape, and anesthetic medicines.  · Ask a family member or friend to take you home from the hospital  Tests before your procedure  Before your procedure you may need tests such as:  · Chest X-ray  · Blood tests, to test your general health  During the procedure  · You will get medicine (anesthesia) so you wont feel pain. Most likely, you will get medicine (sedation) that will make you drowsy or lightly asleep. The doctor will inject local pain medicine to numb the skin on your chest where the cut (incision) will be made.  · The doctor will make an incision where the device will be implanted. This is most often on the left side of the chest just below the collarbone (clavicle).  · The doctor makes a small pocket for the generator under the skin.  · The doctor will put a thin, flexible tube (catheter) into a vein leading to the right atrium. He or she will guide the devices wires (leads) through the catheter to the heart. The doctor will use an " X-ray monitor to move the leads into the right ventricle. He or she will usually also put a lead in the right atrium.  · The doctor will put the lead for the left ventricle into a vein that runs along the outside of this chamber. He or she will also use the X-ray monitor to put this lead in the correct spot. The device will stimulate the left ventricle from the outside. The other leads will stimulate the heart from inside the chambers.  · The doctor will attach the generator to the leads. He or she will send pulses through the leads to test the generator. This testing may cause your heart to race.  · The doctor will then put the generator into the pocket under your skin.  · The doctor will program the device based on the heart rate thats best for you. He or she will check the device to see that it is working.  · The doctor will close the skin with stitches (sutures), surgical glue, or staples. Any stitches used will dissolve over time. If glue is used, it will seal the cut and prevent infection. A bandage will be put on the area.  After the procedure  You will spend several hours in a recovery room. Once you are stable and awake, you will be put in a room that can monitor your heart rhythm. Your healthcare team will also watch your breathing and other vital signs. Youll be given pain medicine if you need it.  The team may place the arm on the side of the device in a sling. This is just for a short time, to keep the arm and shoulder still.  You will have a chest X-ray to make sure the leads are where they should be. The doctor will also check that your lungs were not injured during the procedure.  You can resume a normal diet as soon as you are able. You will be watched overnight. The team will check the device the next morning. You will likely go home the day after your procedure. You may need to take antibiotics for several days to prevent infection.  Recovering at home  Follow all the instructions your  healthcare provider gives you for medicines, bathing, exercise, diet, and wound care. Ask your doctor when you can go back to work or start driving again.  Dont raise your arm above your shoulder on the side of your incision until your doctor says its OK to do so. This gives the leads a chance to secure themselves inside your heart.  Follow-up care  Make sure to keep all your follow-up appointments. This is so your doctor can download information from your device and check its settings. Be sure to tell your doctor how the device is working for you.  Most devices can now be connected to a wireless home monitoring system via the internet. The monitor can download information from your device and send it to your doctor. This lets your doctor make sure the device is working as it should.  Life with a biventricular pacemaker and ICD  · Carry an ID card. When you first get your pacemaker, youll be given an ID card to carry. This card contains important information about the device. Show it to any doctor, dentist, or other provider you visit. Pacemakers may set off metal detectors. So you may need to show your card to security personnel.  · Be careful when using a cell phone. Hold it to the ear farthest from your pacemaker. Dont carry the phone in your breast pocket, even when its turned off.  · Avoid very strong magnets. These include those used for an MRI or in hand-held security wands. Show your ID card when you go through security.  · Avoid strong electrical fields. These are made by radio transmitting towers and ham radios. They are also made by heavy-duty electrical equipment. A running engine makes an electrical field. Don't lean over the open hester of a running car. Most household and yard appliances will not cause any problems. If you use any large power tools, such as an industrial , talk to your doctor.   · Keep an eye on the battery. The battery inside the device is checked regularly. It will last  5 to 7 years, depending on how often it paces or has to stop dangerous heart rhythms. Once it starts to run down, you will need to have it changed. This is like the implantation surgery, but it takes less time. This is because the battery/generator is the only part that needs to be replaced.  · Be careful when driving. Your device has a defibrillator built in. You may not be allowed to drive for the first 6 months after you get the device. You may also not be allowed to drive for 6 months after the defibrillator delivers a shock. You will not be allowed to work as a  if you have an ICD.     When should I call my healthcare provider?  Call 911 if you have chest pain or trouble breathing.  Call your healthcare provider if you have any of the following:  · Redness, severe swelling, severe pain, drainage, bleeding, or warmth at the incision site  · Incision site or opens up or does not heal well  · A fever of 100.4°F (38.0°C) or higher, or as directed by your healthcare provider  · Pain around the generator that gets worse  · Swelling in the arms or hands on the side of the incision  · Sudden weight gain  · Twitching chest or abdominal muscles  · Frequent or constant hiccups  · A shock from your device  · Very fast heartbeat that doesnt stop  · Generator feels loose or like it is wiggling in the pocket under the skin    Date Last Reviewed: 3/17/2016  © 6657-3954 [employer]. [address] [place] [address]. All rights reserved. This information is not intended as a substitute for professional medical care. Always follow your healthcare professional's instructions.        Living with an Implantable Cardioverter Defibrillator (ICD)  Your ICD is a device that monitors the electrical signals in your heart. Most ICDs are well protected from other electrical device interference. Microwave ovens and most common household and yard appliances will not cause problems. Signals from some  "large electric or magnetic fields can make interference "noise" on your ICD. This can cause problems. Possible sources of interference include certain heavy equipment, strong magnets, running motors, and large tools like commercial arc welders. You shouldn't work on your car with the motor running, but it's safe to drive. Check with your doctor about any large, unusual power tools you use.        Cell phones dont usually cause problems, but don't carry your phone in your shirt pocket right over the ICD.   Signals that cause problems  To protect your ICD, take special precautions around:  · Cell phones. Always carry a cell phone on the side opposite your ICD and at least 6 inches away from it. While using a cell phone, wear a headset or hold the phone to the ear opposite your ICD.  · Electromagnetic anti-theft systems. These are often near entrances or exits in stores. Walking through one is OK, but avoid standing near or leaning against one.  · Strong electrical fields. These can be caused by radio transmitting towers and heavy-duty electrical equipment (such as arc welders). A running engine also produces an electrical field. Its OK to ride in a car, but avoid leaning over the open hester of a running car.  · Very strong magnets. Talk with your heart doctor if another doctor tells you to have an MRI (a medical test that uses magnets). Some ICD devices are considered safe for having an MRI (called MR-conditional devices), but safety precautions must still be used. Magnets in big speakers (such as on a stereo or at a concert) and in hand-held security wands (such as those used at airports) can cause problems if they come too close to the ICD.  If a signal interferes  If its near one of the signals described above, the ICD could turn off or its settings could reset. You could even get a shock. If you think you were exposed to a signal like this, call your doctor and explain what happened.  Carry an ID card  Youll be " given a temporary ID card when you get your ICD. The permanent card will be mailed to you in about 6 weeks. Show this card to any doctor, dentist, or other medical professional you visit. Also show it to guards at the airport. This way, they know to follow special procedures that prevent the security wand from interfering with your ICD.  Driving safety with an ICD  ICD devices are implanted to shock the heart out of a life threatening heart rhythm. These heart rhythms can cause loss of consciousness (syncope). Your healthcare provider will give you directions on if and when it's safe to drive after you have had once of these devices implanted. Generally, you should not drive for 6 months after you have had this device implanted, or after the device has delivered a shock. You should never drive for commercial purposes after you have an ICD implanted. This is often restricted by state laws because of the high risk of passing out and the dangers that poses while driving.  Follow up  Plan on having periodic checkups with your healthcare provider to evaluate the battery life of your ICD. Depending on your device and how much your body uses the pacing functions of the ICD, you will need a new device generator implanted at some point, usually about every 5 to 7 years. On average, this monitoring should happen every 6 months, or as advised by your healthcare provider.  For some devices, the monitoring of the device function and battery life can be done with a remote monitor that can be set up in your home. Remote monitoring systems use the internet or telephone to communicate the information from your device to your healthcare provider.  Date Last Reviewed: 5/1/2016  © 2125-5770 [employer]. [address] [place] [address], [place] [address]. All rights reserved. This information is not intended as a substitute for professional medical care. Always follow your healthcare professional's instructions.

## 2019-08-26 ENCOUNTER — TELEPHONE (OUTPATIENT)
Dept: ELECTROPHYSIOLOGY | Facility: CLINIC | Age: 64
End: 2019-08-26

## 2019-08-26 NOTE — TELEPHONE ENCOUNTER
Spoke to Mr. Quintanilla.  CRT-D scheduled for 9/30 at [facility].  Offered procedure to be done on 9/13 at [facility], however pt prefers [facility] location.      Confirmed echo is scheduled for tomorrow for 3 pm at [facility].      Instructions reviewed verbally including:    -Directions to check in desk at [facility]  -NPO after midnight night prior to procedure  -Pre-procedure LABS scheduled for 9/23 at [facility]  -Bath night prior and morning prior to procedure with Hibiclens solution or an antibacterial soap     Mr. Quintanilla verbalizes understanding of above and appreciates call.

## 2019-08-26 NOTE — TELEPHONE ENCOUNTER
----- Message from Yaniv Townsend MD sent at 8/23/2019 11:32 AM CDT -----  Franklyn Mo    Can you help me get Mr. Quintanilla scheduled for CRT-D? He needs a limited echo first. [facility] is helping schedule that.    Limited ECHO  CRT-D implantation, SJM  Can be done at [facility] or [facility]  Anesthesia for sedation  Hold lisinopril day of procedure

## 2019-08-27 ENCOUNTER — HOSPITAL ENCOUNTER (OUTPATIENT)
Dept: CARDIOLOGY | Facility: HOSPITAL | Age: 64
Discharge: HOME | End: 2019-08-27
Attending: INTERNAL MEDICINE
Payer: MEDICARE

## 2019-08-27 DIAGNOSIS — I50.42 CHRONIC COMBINED SYSTOLIC AND DIASTOLIC CONGESTIVE HEART FAILURE: ICD-10-CM

## 2019-08-27 LAB
AORTIC ROOT ANNULUS: 2.9 CM
CV ECHO LV RWT: 0.33 CM
ECHO LV POSTERIOR WALL: 1 CM (ref 0.6–1.1)
FRACTIONAL SHORTENING: 17 % (ref 28–44)
INTERVENTRICULAR SEPTUM: 1 CM (ref 0.6–1.1)
LA MAJOR: 6 CM
LA MINOR: 4.9 CM
LA WIDTH: 4.2 CM
LEFT ATRIUM SIZE: 4.7 CM
LEFT ATRIUM VOLUME: 90.51 CM3
LEFT INTERNAL DIMENSION IN SYSTOLE: 5 CM (ref 2.1–4)
LEFT VENTRICLE DIASTOLIC VOLUME: 141.57 ML
LEFT VENTRICLE SYSTOLIC VOLUME: 120.57 ML
LEFT VENTRICULAR INTERNAL DIMENSION IN DIASTOLE: 6 CM (ref 3.5–6)
LEFT VENTRICULAR MASS: 246.87 G
RA MAJOR: 4.7 CM
RA PRESSURE: 8 MMHG
RA WIDTH: 4.9 CM
RIGHT VENTRICLE FREE WALL THICKNESS: 5.6
RIGHT VENTRICULAR END-DIASTOLIC DIMENSION: 3.2 CM
RIGHT VENTRICULAR LENGTH IN DIASTOLE (APICAL 4-CHAMBER VIEW): 1.7 CM

## 2019-08-27 PROCEDURE — 93308 TTE F-UP OR LMTD: CPT

## 2019-08-27 PROCEDURE — 93308 TTE F-UP OR LMTD: CPT | Mod: 26,,, | Performed by: INTERNAL MEDICINE

## 2019-08-27 PROCEDURE — 93308 TRANSTHORACIC ECHO (TTE) LIMITED (CUPID ONLY): ICD-10-PCS | Mod: 26,,, | Performed by: INTERNAL MEDICINE

## 2019-08-27 NOTE — PROGRESS NOTES
Please notify the patient that EF remains depressed as discussed and will proceed with CRT-D implant as planned.

## 2019-08-28 ENCOUNTER — TELEPHONE (OUTPATIENT)
Dept: ELECTROPHYSIOLOGY | Facility: CLINIC | Age: 64
End: 2019-08-28

## 2019-08-28 DIAGNOSIS — I49.8 OTHER SPECIFIED CARDIAC ARRHYTHMIAS: Primary | ICD-10-CM

## 2019-08-28 NOTE — TELEPHONE ENCOUNTER
Spoke to Mr. Quintanilla.  Results and recommendations given.  Also discussed Dr. Townsend and I spoke about which medication to hold morning of procedure, let him know Dr. Townsend would like for him to hold his Losartan morning of procedure.      Mr. Quintanilla verbalizes understanding of above and appreciates call.

## 2019-09-03 DIAGNOSIS — I10 ESSENTIAL HYPERTENSION: ICD-10-CM

## 2019-09-03 DIAGNOSIS — I44.7 LBBB (LEFT BUNDLE BRANCH BLOCK): ICD-10-CM

## 2019-09-03 DIAGNOSIS — I50.42 CHRONIC COMBINED SYSTOLIC AND DIASTOLIC CONGESTIVE HEART FAILURE: Primary | ICD-10-CM

## 2019-09-16 RX ORDER — FUROSEMIDE 20 MG/1
20 TABLET ORAL DAILY
Qty: 30 TABLET | Refills: 11 | Status: ON HOLD | OUTPATIENT
Start: 2019-09-16 | End: 2020-02-13 | Stop reason: HOSPADM

## 2019-09-16 RX ORDER — CARVEDILOL 3.12 MG/1
3.12 TABLET ORAL 2 TIMES DAILY
Qty: 60 TABLET | Refills: 11 | Status: SHIPPED | OUTPATIENT
Start: 2019-09-16 | End: 2020-01-13

## 2019-09-16 RX ORDER — LOSARTAN POTASSIUM 25 MG/1
12.5 TABLET ORAL DAILY
Qty: 45 TABLET | Refills: 3 | Status: ON HOLD | OUTPATIENT
Start: 2019-09-16 | End: 2020-02-13 | Stop reason: HOSPADM

## 2019-09-16 NOTE — TELEPHONE ENCOUNTER
Patient requested refills on     Carvedilol 3.125mg    Losartan 25 mg    Furosemide 20 mg    Sent to [facility]

## 2019-09-16 NOTE — TELEPHONE ENCOUNTER
----- Message from Raysa Barboza sent at 9/16/2019  2:56 PM CDT -----  Contact: [phone]/self  Patient is requesting to speak with you concerning medication  Please call back to assist at [phone]

## 2019-09-19 ENCOUNTER — TELEPHONE (OUTPATIENT)
Dept: ELECTROPHYSIOLOGY | Facility: CLINIC | Age: 64
End: 2019-09-19

## 2019-09-19 DIAGNOSIS — I50.42 CHRONIC COMBINED SYSTOLIC AND DIASTOLIC CONGESTIVE HEART FAILURE: Primary | ICD-10-CM

## 2019-09-19 DIAGNOSIS — I10 ESSENTIAL HYPERTENSION: ICD-10-CM

## 2019-09-19 DIAGNOSIS — I44.7 LBBB (LEFT BUNDLE BRANCH BLOCK): ICD-10-CM

## 2019-09-19 NOTE — TELEPHONE ENCOUNTER
Dwight Davis Gregory, [id number], is scheduled for a CRT-D ([facility]) with Dr Townsend on 9/30/19.

## 2019-09-25 ENCOUNTER — HOSPITAL ENCOUNTER (OUTPATIENT)
Facility: HOSPITAL | Age: 64
Discharge: HOME | End: 2019-09-26
Attending: INTERNAL MEDICINE | Admitting: INTERNAL MEDICINE
Payer: MEDICARE

## 2019-09-25 ENCOUNTER — TELEPHONE (OUTPATIENT)
Dept: ELECTROPHYSIOLOGY | Facility: CLINIC | Age: 64
End: 2019-09-25

## 2019-09-25 ENCOUNTER — ANESTHESIA (OUTPATIENT)
Dept: MEDSURG UNIT | Facility: HOSPITAL | Age: 64
End: 2019-09-25
Payer: MEDICARE

## 2019-09-25 ENCOUNTER — ANESTHESIA EVENT (OUTPATIENT)
Dept: MEDSURG UNIT | Facility: HOSPITAL | Age: 64
End: 2019-09-25
Payer: MEDICARE

## 2019-09-25 DIAGNOSIS — I50.42 CHRONIC COMBINED SYSTOLIC AND DIASTOLIC CONGESTIVE HEART FAILURE: Primary | ICD-10-CM

## 2019-09-25 DIAGNOSIS — I50.40 COMBINED SYSTOLIC AND DIASTOLIC CONGESTIVE HEART FAILURE, NYHA CLASS 2, UNSPECIFIED CONGESTIVE HEART FAILURE CHRONICITY: ICD-10-CM

## 2019-09-25 DIAGNOSIS — Z95.0 PACEMAKER: ICD-10-CM

## 2019-09-25 DIAGNOSIS — I49.9 ARRHYTHMIA: ICD-10-CM

## 2019-09-25 DIAGNOSIS — Z95.810 ICD (IMPLANTABLE CARDIOVERTER-DEFIBRILLATOR) IN PLACE: ICD-10-CM

## 2019-09-25 DIAGNOSIS — I44.7 LBBB (LEFT BUNDLE BRANCH BLOCK): ICD-10-CM

## 2019-09-25 LAB
ANION GAP SERPL CALC-SCNC: 6 MMOL/L (ref 8–16)
APTT BLDCRRT: 24.7 SEC (ref 21–32)
BASOPHILS # BLD AUTO: 0.01 K/UL (ref 0–0.2)
BASOPHILS NFR BLD: 0.2 % (ref 0–1.9)
BUN SERPL-MCNC: 12 MG/DL (ref 8–23)
CALCIUM SERPL-MCNC: 8.6 MG/DL (ref 8.7–10.5)
CHLORIDE SERPL-SCNC: 106 MMOL/L (ref 95–110)
CO2 SERPL-SCNC: 30 MMOL/L (ref 23–29)
CREAT SERPL-MCNC: 1.3 MG/DL (ref 0.5–1.4)
DIFFERENTIAL METHOD: ABNORMAL
EOSINOPHIL # BLD AUTO: 0 K/UL (ref 0–0.5)
EOSINOPHIL NFR BLD: 0.4 % (ref 0–8)
ERYTHROCYTE [DISTWIDTH] IN BLOOD BY AUTOMATED COUNT: 15.4 % (ref 11.5–14.5)
EST. GFR  (AFRICAN AMERICAN): >60 ML/MIN/1.73 M^2
EST. GFR  (NON AFRICAN AMERICAN): 58.5 ML/MIN/1.73 M^2
GLUCOSE SERPL-MCNC: 122 MG/DL (ref 70–110)
HCT VFR BLD AUTO: 44.3 % (ref 40–54)
HGB BLD-MCNC: 13.6 G/DL (ref 14–18)
IMM GRANULOCYTES # BLD AUTO: 0.01 K/UL (ref 0–0.04)
IMM GRANULOCYTES NFR BLD AUTO: 0.2 % (ref 0–0.5)
INR PPP: 1 (ref 0.8–1.2)
LYMPHOCYTES # BLD AUTO: 0.5 K/UL (ref 1–4.8)
LYMPHOCYTES NFR BLD: 8.6 % (ref 18–48)
MCH RBC QN AUTO: 26.6 PG (ref 27–31)
MCHC RBC AUTO-ENTMCNC: 30.7 G/DL (ref 32–36)
MCV RBC AUTO: 87 FL (ref 82–98)
MONOCYTES # BLD AUTO: 0.1 K/UL (ref 0.3–1)
MONOCYTES NFR BLD: 1.9 % (ref 4–15)
NEUTROPHILS # BLD AUTO: 4.7 K/UL (ref 1.8–7.7)
NEUTROPHILS NFR BLD: 88.7 % (ref 38–73)
NRBC BLD-RTO: 0 /100 WBC
PLATELET # BLD AUTO: 141 K/UL (ref 150–350)
PMV BLD AUTO: 14.1 FL (ref 9.2–12.9)
POTASSIUM SERPL-SCNC: 4.4 MMOL/L (ref 3.5–5.1)
PROTHROMBIN TIME: 10.6 SEC (ref 9–12.5)
RBC # BLD AUTO: 5.12 M/UL (ref 4.6–6.2)
SODIUM SERPL-SCNC: 142 MMOL/L (ref 136–145)
WBC # BLD AUTO: 5.26 K/UL (ref 3.9–12.7)

## 2019-09-25 PROCEDURE — 85610 PROTHROMBIN TIME: CPT

## 2019-09-25 PROCEDURE — 33249 INSJ/RPLCMT DEFIB W/LEAD(S): CPT | Mod: ,,, | Performed by: INTERNAL MEDICINE

## 2019-09-25 PROCEDURE — C1894 INTRO/SHEATH, NON-LASER: HCPCS | Performed by: INTERNAL MEDICINE

## 2019-09-25 PROCEDURE — 80048 BASIC METABOLIC PNL TOTAL CA: CPT

## 2019-09-25 PROCEDURE — 37000009 HC ANESTHESIA EA ADD 15 MINS: Performed by: INTERNAL MEDICINE

## 2019-09-25 PROCEDURE — 94761 N-INVAS EAR/PLS OXIMETRY MLT: CPT

## 2019-09-25 PROCEDURE — C1887 CATHETER, GUIDING: HCPCS | Performed by: INTERNAL MEDICINE

## 2019-09-25 PROCEDURE — C1882 AICD, OTHER THAN SING/DUAL: HCPCS | Performed by: INTERNAL MEDICINE

## 2019-09-25 PROCEDURE — 93005 ELECTROCARDIOGRAM TRACING: CPT | Mod: 59

## 2019-09-25 PROCEDURE — 93010 EKG 12-LEAD: ICD-10-PCS | Mod: ,,, | Performed by: INTERNAL MEDICINE

## 2019-09-25 PROCEDURE — 63600175 PHARM REV CODE 636 W HCPCS: Performed by: NURSE ANESTHETIST, CERTIFIED REGISTERED

## 2019-09-25 PROCEDURE — 25000003 PHARM REV CODE 250: Performed by: NURSE ANESTHETIST, CERTIFIED REGISTERED

## 2019-09-25 PROCEDURE — C1900 LEAD, CORONARY VENOUS: HCPCS | Performed by: INTERNAL MEDICINE

## 2019-09-25 PROCEDURE — 85025 COMPLETE CBC W/AUTO DIFF WBC: CPT

## 2019-09-25 PROCEDURE — 37000008 HC ANESTHESIA 1ST 15 MINUTES: Performed by: INTERNAL MEDICINE

## 2019-09-25 PROCEDURE — C1898 LEAD, PMKR, OTHER THAN TRANS: HCPCS | Performed by: INTERNAL MEDICINE

## 2019-09-25 PROCEDURE — 85730 THROMBOPLASTIN TIME PARTIAL: CPT

## 2019-09-25 PROCEDURE — D9220A PRA ANESTHESIA: ICD-10-PCS | Mod: CRNA,,, | Performed by: NURSE ANESTHETIST, CERTIFIED REGISTERED

## 2019-09-25 PROCEDURE — 33249 INSJ/RPLCMT DEFIB W/LEAD(S): CPT | Performed by: INTERNAL MEDICINE

## 2019-09-25 PROCEDURE — 36415 COLL VENOUS BLD VENIPUNCTURE: CPT

## 2019-09-25 PROCEDURE — 25000003 PHARM REV CODE 250: Performed by: INTERNAL MEDICINE

## 2019-09-25 PROCEDURE — 33249 PR ICD INSERT SNGL/DUAL W/LEADS: ICD-10-PCS | Mod: ,,, | Performed by: INTERNAL MEDICINE

## 2019-09-25 PROCEDURE — D9220A PRA ANESTHESIA: Mod: ANES,,, | Performed by: ANESTHESIOLOGY

## 2019-09-25 PROCEDURE — 33225 L VENTRIC PACING LEAD ADD-ON: CPT | Mod: ,,, | Performed by: INTERNAL MEDICINE

## 2019-09-25 PROCEDURE — 63600175 PHARM REV CODE 636 W HCPCS: Performed by: INTERNAL MEDICINE

## 2019-09-25 PROCEDURE — D9220A PRA ANESTHESIA: Mod: CRNA,,, | Performed by: NURSE ANESTHETIST, CERTIFIED REGISTERED

## 2019-09-25 PROCEDURE — 33225 PR INSRT PACING ELECT,AT INSERT NEW DEVICE: ICD-10-PCS | Mod: ,,, | Performed by: INTERNAL MEDICINE

## 2019-09-25 PROCEDURE — C1769 GUIDE WIRE: HCPCS | Performed by: INTERNAL MEDICINE

## 2019-09-25 PROCEDURE — 93010 ELECTROCARDIOGRAM REPORT: CPT | Mod: ,,, | Performed by: INTERNAL MEDICINE

## 2019-09-25 PROCEDURE — C1777 LEAD, AICD, ENDO SINGLE COIL: HCPCS | Performed by: INTERNAL MEDICINE

## 2019-09-25 PROCEDURE — D9220A PRA ANESTHESIA: ICD-10-PCS | Mod: ANES,,, | Performed by: ANESTHESIOLOGY

## 2019-09-25 PROCEDURE — 27201423 OPTIME MED/SURG SUP & DEVICES STERILE SUPPLY: Performed by: INTERNAL MEDICINE

## 2019-09-25 PROCEDURE — 93010 ELECTROCARDIOGRAM REPORT: CPT | Mod: 76,,, | Performed by: INTERNAL MEDICINE

## 2019-09-25 PROCEDURE — 33225 L VENTRIC PACING LEAD ADD-ON: CPT | Performed by: INTERNAL MEDICINE

## 2019-09-25 PROCEDURE — C1893 INTRO/SHEATH, FIXED,NON-PEEL: HCPCS | Performed by: INTERNAL MEDICINE

## 2019-09-25 DEVICE — DEFIBRILLATION LEAD
Type: IMPLANTABLE DEVICE | Site: HEART | Status: FUNCTIONAL
Brand: DURATA™

## 2019-09-25 DEVICE — CARDIAC RESYNCHRONIZATION DEVICE, TIERED-THERAPY CARDIOVERTER/DEFIBRILLATOR VVED DDDRV
Type: IMPLANTABLE DEVICE | Site: HEART | Status: FUNCTIONAL
Brand: QUADRA ASSURA MP™

## 2019-09-25 DEVICE — LEFT HEART LEAD
Type: IMPLANTABLE DEVICE | Site: HEART | Status: FUNCTIONAL
Brand: QUARTET™

## 2019-09-25 DEVICE — PACING LEAD
Type: IMPLANTABLE DEVICE | Site: HEART | Status: FUNCTIONAL
Brand: TENDRIL™

## 2019-09-25 RX ORDER — ASPIRIN 81 MG/1
81 TABLET ORAL DAILY
Status: DISCONTINUED | OUTPATIENT
Start: 2019-09-26 | End: 2019-09-26 | Stop reason: HOSPADM

## 2019-09-25 RX ORDER — ETOMIDATE 2 MG/ML
INJECTION INTRAVENOUS
Status: DISCONTINUED | OUTPATIENT
Start: 2019-09-25 | End: 2019-09-25

## 2019-09-25 RX ORDER — KETAMINE HCL IN 0.9 % NACL 50 MG/5 ML
SYRINGE (ML) INTRAVENOUS
Status: DISCONTINUED | OUTPATIENT
Start: 2019-09-25 | End: 2019-09-25

## 2019-09-25 RX ORDER — FENTANYL CITRATE 50 UG/ML
INJECTION, SOLUTION INTRAMUSCULAR; INTRAVENOUS
Status: DISCONTINUED | OUTPATIENT
Start: 2019-09-25 | End: 2019-09-25

## 2019-09-25 RX ORDER — ACETAMINOPHEN 325 MG/1
650 TABLET ORAL EVERY 4 HOURS PRN
Status: DISCONTINUED | OUTPATIENT
Start: 2019-09-25 | End: 2019-09-26 | Stop reason: HOSPADM

## 2019-09-25 RX ORDER — MIDAZOLAM HYDROCHLORIDE 1 MG/ML
INJECTION, SOLUTION INTRAMUSCULAR; INTRAVENOUS
Status: DISCONTINUED | OUTPATIENT
Start: 2019-09-25 | End: 2019-09-25

## 2019-09-25 RX ORDER — CEFAZOLIN SODIUM 1 G/3ML
2 INJECTION, POWDER, FOR SOLUTION INTRAMUSCULAR; INTRAVENOUS
Status: DISCONTINUED | OUTPATIENT
Start: 2019-09-25 | End: 2019-09-25 | Stop reason: HOSPADM

## 2019-09-25 RX ORDER — ALBUTEROL SULFATE 90 UG/1
1 AEROSOL, METERED RESPIRATORY (INHALATION) EVERY 6 HOURS PRN
Status: DISCONTINUED | OUTPATIENT
Start: 2019-09-25 | End: 2019-09-26 | Stop reason: HOSPADM

## 2019-09-25 RX ORDER — FENTANYL CITRATE 50 UG/ML
25 INJECTION, SOLUTION INTRAMUSCULAR; INTRAVENOUS EVERY 5 MIN PRN
Status: DISCONTINUED | OUTPATIENT
Start: 2019-09-25 | End: 2019-09-26 | Stop reason: HOSPADM

## 2019-09-25 RX ORDER — CEPHALEXIN 500 MG/1
500 CAPSULE ORAL 3 TIMES DAILY
Status: DISCONTINUED | OUTPATIENT
Start: 2019-09-26 | End: 2019-09-26 | Stop reason: HOSPADM

## 2019-09-25 RX ORDER — FUROSEMIDE 20 MG/1
20 TABLET ORAL DAILY
Status: DISCONTINUED | OUTPATIENT
Start: 2019-09-26 | End: 2019-09-26 | Stop reason: HOSPADM

## 2019-09-25 RX ORDER — LIDOCAINE HYDROCHLORIDE 20 MG/ML
INJECTION, SOLUTION INFILTRATION; PERINEURAL
Status: DISCONTINUED | OUTPATIENT
Start: 2019-09-25 | End: 2019-09-26 | Stop reason: HOSPADM

## 2019-09-25 RX ORDER — PROPOFOL 10 MG/ML
VIAL (ML) INTRAVENOUS CONTINUOUS PRN
Status: DISCONTINUED | OUTPATIENT
Start: 2019-09-25 | End: 2019-09-25

## 2019-09-25 RX ORDER — CEFAZOLIN SODIUM 1 G/3ML
2 INJECTION, POWDER, FOR SOLUTION INTRAMUSCULAR; INTRAVENOUS
Status: COMPLETED | OUTPATIENT
Start: 2019-09-25 | End: 2019-09-26

## 2019-09-25 RX ORDER — SODIUM CHLORIDE 9 MG/ML
INJECTION, SOLUTION INTRAVENOUS CONTINUOUS PRN
Status: DISCONTINUED | OUTPATIENT
Start: 2019-09-25 | End: 2019-09-25

## 2019-09-25 RX ORDER — CEFAZOLIN SODIUM 1 G/3ML
INJECTION, POWDER, FOR SOLUTION INTRAMUSCULAR; INTRAVENOUS
Status: DISCONTINUED | OUTPATIENT
Start: 2019-09-25 | End: 2019-09-25

## 2019-09-25 RX ORDER — BUPIVACAINE HYDROCHLORIDE 2.5 MG/ML
INJECTION, SOLUTION EPIDURAL; INFILTRATION; INTRACAUDAL
Status: DISCONTINUED | OUTPATIENT
Start: 2019-09-25 | End: 2019-09-26 | Stop reason: HOSPADM

## 2019-09-25 RX ORDER — CARVEDILOL 3.12 MG/1
3.12 TABLET ORAL 2 TIMES DAILY
Status: DISCONTINUED | OUTPATIENT
Start: 2019-09-25 | End: 2019-09-26 | Stop reason: HOSPADM

## 2019-09-25 RX ORDER — LIDOCAINE HCL/PF 100 MG/5ML
SYRINGE (ML) INTRAVENOUS
Status: DISCONTINUED | OUTPATIENT
Start: 2019-09-25 | End: 2019-09-25

## 2019-09-25 RX ORDER — SODIUM CHLORIDE 0.9 % (FLUSH) 0.9 %
10 SYRINGE (ML) INJECTION
Status: DISCONTINUED | OUTPATIENT
Start: 2019-09-25 | End: 2019-09-26 | Stop reason: HOSPADM

## 2019-09-25 RX ORDER — ONDANSETRON 2 MG/ML
4 INJECTION INTRAMUSCULAR; INTRAVENOUS DAILY PRN
Status: DISCONTINUED | OUTPATIENT
Start: 2019-09-25 | End: 2019-09-26 | Stop reason: HOSPADM

## 2019-09-25 RX ADMIN — SODIUM CHLORIDE: 0.9 INJECTION, SOLUTION INTRAVENOUS at 01:09

## 2019-09-25 RX ADMIN — FENTANYL CITRATE 50 MCG: 50 INJECTION, SOLUTION INTRAMUSCULAR; INTRAVENOUS at 02:09

## 2019-09-25 RX ADMIN — FENTANYL CITRATE 25 MCG: 50 INJECTION, SOLUTION INTRAMUSCULAR; INTRAVENOUS at 04:09

## 2019-09-25 RX ADMIN — PROPOFOL 50 MCG/KG/MIN: 10 INJECTION, EMULSION INTRAVENOUS at 01:09

## 2019-09-25 RX ADMIN — MIDAZOLAM HYDROCHLORIDE 1 MG: 1 INJECTION, SOLUTION INTRAMUSCULAR; INTRAVENOUS at 01:09

## 2019-09-25 RX ADMIN — CEFAZOLIN 2 G: 1 INJECTION, POWDER, FOR SOLUTION INTRAMUSCULAR; INTRAVENOUS at 10:09

## 2019-09-25 RX ADMIN — CEFAZOLIN 2 G: 330 INJECTION, POWDER, FOR SOLUTION INTRAMUSCULAR; INTRAVENOUS at 01:09

## 2019-09-25 RX ADMIN — Medication 20 MG: at 01:09

## 2019-09-25 RX ADMIN — CARVEDILOL 3.12 MG: 3.12 TABLET, FILM COATED ORAL at 10:09

## 2019-09-25 RX ADMIN — FENTANYL CITRATE 25 MCG: 50 INJECTION, SOLUTION INTRAMUSCULAR; INTRAVENOUS at 03:09

## 2019-09-25 RX ADMIN — LIDOCAINE HYDROCHLORIDE 60 MG: 20 INJECTION, SOLUTION INTRAVENOUS at 01:09

## 2019-09-25 RX ADMIN — Medication 10 MG: at 03:09

## 2019-09-25 RX ADMIN — ETOMIDATE 8 MG: 2 INJECTION, SOLUTION INTRAVENOUS at 02:09

## 2019-09-25 RX ADMIN — ACETAMINOPHEN 650 MG: 325 TABLET ORAL at 10:09

## 2019-09-25 RX ADMIN — Medication 10 MG: at 02:09

## 2019-09-25 NOTE — Clinical Note
A venogram was performed in the left heart veins. The vessel was injected with hand injection  with 8 mL of contrast.

## 2019-09-25 NOTE — TRANSFER OF CARE
"Anesthesia Transfer of Care Note    Patient: Dima Quintanilla    Procedure(s) Performed: Procedure(s) (LRB):  INSERTION, ICD, BIVENTRICULAR (N/A)    Patient location: PACU    Anesthesia Type: general    Transport from OR: Transported from OR on 6-10 L/min O2 by face mask with adequate spontaneous ventilation    Post pain: adequate analgesia    Post assessment: no apparent anesthetic complications and tolerated procedure well    Post vital signs: stable    Level of consciousness: sedated and responds to stimulation    Nausea/Vomiting: no nausea/vomiting    Complications: none    Transfer of care protocol was followed      Last vitals:   Visit Vitals  BP (!) 143/93 (BP Location: Right arm, Patient Position: Lying)   Pulse 71   Temp 35.9 °C (96.6 °F) (Oral)   Resp 18   Ht 5' 6" (1.676 m)   Wt 67.6 kg (149 lb)   SpO2 96%   BMI 24.05 kg/m²     "

## 2019-09-25 NOTE — PLAN OF CARE
Patient arrived to room. PIV placed. Admit assessment completed. Plan of care discussed with patient. Will monitor

## 2019-09-25 NOTE — ANESTHESIA PREPROCEDURE EVALUATION
09/25/2019  Dima Quintanilla is a 63 y.o., male presenting for ICD placement.  Hx of NICM with LVEF 15%.    Past Medical History:   Diagnosis Date    Asthma     COPD (chronic obstructive pulmonary disease)      Past Surgical History:   Procedure Laterality Date    CORONARY ANGIOGRAPHY N/A 5/28/2019    Procedure: ANGIOGRAM, CORONARY ARTERY;  Surgeon: Nikunj Pabon MD;  Location: [facility] CATH LAB/EP;  Service: Cardiology;  Laterality: N/A;    LEFT HEART CATHETERIZATION N/A 5/28/2019    Procedure: Left heart cath;  Surgeon: Nikunj Pabon MD;  Location: [facility] CATH LAB/EP;  Service: Cardiology;  Laterality: N/A;     Review of patient's allergies indicates:  No Known Allergies  No current facility-administered medications on file prior to encounter.      Current Outpatient Medications on File Prior to Encounter   Medication Sig Dispense Refill    albuterol (ACCUNEB) 0.63 mg/3 mL Nebu Take 3 mLs (0.63 mg total) by nebulization every 6 (six) hours as needed. 1 vial 5    albuterol (PROAIR HFA) 90 mcg/actuation inhaler Inhale 2 puffs into the lungs every 6 (six) hours as needed for Wheezing or Shortness of Breath (or cough). 18 g 11    aspirin (ECOTRIN) 81 MG EC tablet Take 1 tablet (81 mg total) by mouth once daily. 90 tablet 3    budesonide-formoterol 160-4.5 mcg (SYMBICORT) 160-4.5 mcg/actuation HFAA Inhale 2 puffs into the lungs every 12 (twelve) hours. 3 Inhaler 4    fluticasone propionate (FLONASE) 50 mcg/actuation nasal spray spray 2 sprays (100 mcg total) by Each Nare route once daily. 16 g 0    sildenafil (VIAGRA) 50 MG tablet Take 1 tablet (50 mg total) by mouth daily as needed for Erectile Dysfunction. 10 tablet 0     Lab Results   Component Value Date    WBC 5.26 09/25/2019    HGB 13.6 (L) 09/25/2019    HCT 44.3 09/25/2019    MCV 87 09/25/2019     (L) 09/25/2019     BMP  Lab Results    Component Value Date     08/08/2019    K 4.6 08/08/2019     08/08/2019    CO2 28 08/08/2019    BUN 18 08/08/2019    CREATININE 1.5 (H) 08/08/2019    CALCIUM 9.6 08/08/2019    ANIONGAP 9 08/08/2019    ESTGFRAFRICA 57 (A) 08/08/2019    EGFRNONAA 49 (A) 08/08/2019         Anesthesia Evaluation    I have reviewed the Patient Summary Reports.     I have reviewed the Medications.     Review of Systems  Anesthesia Hx:  No problems with previous Anesthesia   Denies Personal Hx of Anesthesia complications.   Cardiovascular:   Exercise tolerance: good Hypertension Dysrhythmias CHF ECG has been reviewed.    Pulmonary:   COPD Sleep Apnea    Renal/:   Chronic Renal Disease, CRI    Hepatic/GI:  Hepatic/GI Normal        Physical Exam  General:  Well nourished    Airway/Jaw/Neck:  Airway Findings: Mouth Opening: Normal Tongue: Normal  General Airway Assessment: Adult  Mallampati: II  TM Distance: Normal, at least 6 cm      Dental:  Dental Findings: Edentulous        Mental Status:  Mental Status Findings:  Cooperative, Alert and Oriented         Anesthesia Plan  Type of Anesthesia, risks & benefits discussed:  Anesthesia Type:  general, MAC  Patient's Preference:   Intra-op Monitoring Plan: standard ASA monitors  Intra-op Monitoring Plan Comments:   Post Op Pain Control Plan: per primary service following discharge from PACU and IV/PO Opioids PRN  Post Op Pain Control Plan Comments:   Induction:   IV  Beta Blocker:  Patient is not currently on a Beta-Blocker (No further documentation required).       Informed Consent: Patient understands risks and agrees with Anesthesia plan.  Questions answered. Anesthesia consent signed with patient.  ASA Score: 4     Day of Surgery Review of History & Physical:    H&P update referred to the surgeon.         Ready For Surgery From Anesthesia Perspective.

## 2019-09-25 NOTE — BRIEF OP NOTE
"Post EP Procedure Note  Referring Physician: Yaniv Townsend MD  Procedure: INSERTION, ICD, BIVENTRICULAR (N/A)       Access: Left subclavian vein    See full report for further details    Intervention:     Successful CRT-D implantation, without complication.    Closure device: Suture    Post Cath Exam:   BP (!) 143/93 (BP Location: Right arm, Patient Position: Lying)   Pulse 71   Temp 96.6 °F (35.9 °C) (Oral)   Resp 18   Ht 5' 6" (1.676 m)   Wt 67.6 kg (149 lb)   SpO2 96%   BMI 24.05 kg/m²   No unusual pain, hematoma, thrill or bruit at vascular access site.  Distal pulse present without signs of ischemia.    Recommendations:     - Place in outpatient, extended recovery  - Resume home medications  - Sling to left arm - wear for 48 hours, then only at night for 6 weeks.  - NO HEPARIN PRODUCTS  - Ancef 2 grams IV Q8h x 2 more doses  - Keflex 500 mg TID for five days   - No lifting left elbow above shoulder height  - No lifting over 5 pounds  - No driving for 1 week and for 4 weeks if patient uses left arm to make turns  - Patient may shower in 48 hours, do not let beam of shower hit site directly and no scrubbing in area  - Follow up in device clinic in 1 week    Syed Olmstead MD  PGY-V  "

## 2019-09-25 NOTE — ANESTHESIA POSTPROCEDURE EVALUATION
Anesthesia Post Evaluation    Patient: Dima Quintanilla    Procedure(s) Performed: Procedure(s) (LRB):  INSERTION, ICD, BIVENTRICULAR (N/A)    Final Anesthesia Type: general  Patient location during evaluation: PACU  Patient participation: Yes- Able to Participate  Level of consciousness: awake and alert  Post-procedure vital signs: reviewed and stable  Pain management: adequate  Airway patency: patent  PONV status at discharge: No PONV  Anesthetic complications: no      Cardiovascular status: blood pressure returned to baseline  Respiratory status: unassisted  Hydration status: euvolemic  Follow-up not needed.          Vitals Value Taken Time   /92 9/25/2019  6:36 PM   Temp 36.5 °C (97.7 °F) 9/25/2019  6:00 PM   Pulse 75 9/25/2019  6:42 PM   Resp 23 9/25/2019  6:42 PM   SpO2 97 % 9/25/2019  6:42 PM   Vitals shown include unvalidated device data.      No case tracking events are documented in the log.      Pain/Cory Score: Pain Rating Prior to Med Admin: 0 (9/25/2019  5:25 PM)  Pain Rating Post Med Admin: 0 (9/25/2019  6:00 PM)  Cory Score: 10 (9/25/2019  6:00 PM)

## 2019-09-25 NOTE — H&P
[facility]  Cardiac Electrophysiology  History and Physical     Admission Date: 9/25/2019  Code Status: Prior   Attending Provider: Yaniv Townsend MD   Principal Problem:<principal problem not specified>    Subjective:     Chief Complaint:  Cardiomyopathy     HPI:  63 year old male here for CRT-D implantation; he has a history of HFrEF/NICM (EF 10%, normal coronary arteries on LHC), sudden cardiac death, LBBB, HTN and COPD. Repeat echocardiography has revealed persistently depressed LVEF. He continues to experience shortness of breath, 2 pillow orthopnea and PND but denies peripheral edema. He reports no symptoms of angina and has not experienced syncope.    Last dose lisinopril 9/24/19    TTE 8/27/19    Conclusion     · Severely decreased left ventricular systolic function. The estimated ejection fraction is 15%  · Mildly reduced right ventricular systolic function.  · Severe left atrial enlargement.  · Moderate right atrial enlargement.  · Intermediate central venous pressure (8 mm Hg).      Past Medical History:   Diagnosis Date    Asthma     COPD (chronic obstructive pulmonary disease)        Past Surgical History:   Procedure Laterality Date    CORONARY ANGIOGRAPHY N/A 5/28/2019    Procedure: ANGIOGRAM, CORONARY ARTERY;  Surgeon: Nikunj Pabon MD;  Location: [facility] CATH LAB/EP;  Service: Cardiology;  Laterality: N/A;    LEFT HEART CATHETERIZATION N/A 5/28/2019    Procedure: Left heart cath;  Surgeon: Nikunj Pabon MD;  Location: [facility] CATH LAB/EP;  Service: Cardiology;  Laterality: N/A;       Review of patient's allergies indicates:  No Known Allergies    No current facility-administered medications on file prior to encounter.      Current Outpatient Medications on File Prior to Encounter   Medication Sig    albuterol (ACCUNEB) 0.63 mg/3 mL Nebu Take 3 mLs (0.63 mg total) by nebulization every 6 (six) hours as needed.    albuterol (PROAIR HFA) 90 mcg/actuation inhaler Inhale 2 puffs  into the lungs every 6 (six) hours as needed for Wheezing or Shortness of Breath (or cough).    aspirin (ECOTRIN) 81 MG EC tablet Take 1 tablet (81 mg total) by mouth once daily.    budesonide-formoterol 160-4.5 mcg (SYMBICORT) 160-4.5 mcg/actuation HFAA Inhale 2 puffs into the lungs every 12 (twelve) hours.    fluticasone propionate (FLONASE) 50 mcg/actuation nasal spray spray 2 sprays (100 mcg total) by Each Nare route once daily.    sildenafil (VIAGRA) 50 MG tablet Take 1 tablet (50 mg total) by mouth daily as needed for Erectile Dysfunction.     Family History     None        Tobacco Use    Smoking status: Never Smoker    Smokeless tobacco: Never Used   Substance and Sexual Activity    Alcohol use: Yes     Alcohol/week: 9.0 standard drinks     Types: 9 Cans of beer per week     Comment: Stated he drinks on Friday, Saturday, & Sunday.     Drug use: No    Sexual activity: Not on file     Review of Systems   All other systems reviewed and are negative.    Objective:     Vital Signs (Most Recent):  Temp: 96.6 °F (35.9 °C) (09/25/19 1027)  Pulse: 71 (09/25/19 1027)  Resp: 18 (09/25/19 1027)  BP: (!) 143/93 (09/25/19 1029)  SpO2: 96 % (09/25/19 1027) Vital Signs (24h Range):  Temp:  [96.6 °F (35.9 °C)] 96.6 °F (35.9 °C)  Pulse:  [71] 71  Resp:  [18] 18  SpO2:  [96 %] 96 %  BP: (143-145)/(93-95) 143/93       Weight: 67.6 kg (149 lb)  Body mass index is 24.05 kg/m².    SpO2: 96 %  O2 Device (Oxygen Therapy): room air    Physical Exam   Constitutional: He is oriented to person, place, and time. He appears well-developed and well-nourished. No distress.   HENT:   Head: Normocephalic and atraumatic.   Neck: No JVD present.   Cardiovascular: Normal rate, regular rhythm, normal heart sounds and intact distal pulses. Exam reveals no gallop and no friction rub.   No murmur heard.  Pulmonary/Chest: Effort normal. No respiratory distress. He has no wheezes.   Bibasilar crackles   Abdominal: Soft. Bowel sounds are  normal. He exhibits no distension. There is no tenderness.   Musculoskeletal: He exhibits no edema.   Neurological: He is alert and oriented to person, place, and time.   Skin: He is not diaphoretic.       Significant Labs:     Recent Results (from the past 24 hour(s))   CBC auto differential    Collection Time: 09/25/19  9:47 AM   Result Value Ref Range    WBC 5.26 3.90 - 12.70 K/uL    RBC 5.12 4.60 - 6.20 M/uL    Hemoglobin 13.6 (L) 14.0 - 18.0 g/dL    Hematocrit 44.3 40.0 - 54.0 %    Mean Corpuscular Volume 87 82 - 98 fL    Mean Corpuscular Hemoglobin 26.6 (L) 27.0 - 31.0 pg    Mean Corpuscular Hemoglobin Conc 30.7 (L) 32.0 - 36.0 g/dL    RDW 15.4 (H) 11.5 - 14.5 %    Platelets 141 (L) 150 - 350 K/uL    MPV 14.1 (H) 9.2 - 12.9 fL    Immature Granulocytes 0.2 0.0 - 0.5 %    Gran # (ANC) 4.7 1.8 - 7.7 K/uL    Immature Grans (Abs) 0.01 0.00 - 0.04 K/uL    Lymph # 0.5 (L) 1.0 - 4.8 K/uL    Mono # 0.1 (L) 0.3 - 1.0 K/uL    Eos # 0.0 0.0 - 0.5 K/uL    Baso # 0.01 0.00 - 0.20 K/uL    nRBC 0 0 /100 WBC    Gran% 88.7 (H) 38.0 - 73.0 %    Lymph% 8.6 (L) 18.0 - 48.0 %    Mono% 1.9 (L) 4.0 - 15.0 %    Eosinophil% 0.4 0.0 - 8.0 %    Basophil% 0.2 0.0 - 1.9 %    Differential Method Automated    APTT    Collection Time: 09/25/19 11:38 AM   Result Value Ref Range    aPTT 24.7 21.0 - 32.0 sec   Protime-INR    Collection Time: 09/25/19 11:38 AM   Result Value Ref Range    Prothrombin Time 10.6 9.0 - 12.5 sec    INR 1.0 0.8 - 1.2         Significant Imaging:     I have reviewed all pertinent imaging studies      Assessment and Plan:     Chronic combined systolic and diastolic congestive heart failure  NYHA III HFrEF (15%),  ms and history of SCD  CRT-D implantation today  The risks and benefits of cardiac device implantation were discussed with the patient and his family and informed consent was obtained. Risks included but were not limited to: scarring, bleeding, infection, pneumothorax, cardiac perforation, pericardial  tamponade, the possible requirement for emergency surgery, MI, stroke and death. The patient understands these risks and wishes to proceed.          Syed Olmstead MD  Cardiac Electrophysiology  [facility]

## 2019-09-25 NOTE — TELEPHONE ENCOUNTER
Franklyn Ramey,  Mr. Quintanilla' CRT-D was moved up to today with Dr. Townsend (he was scheduled on 9/30).  Thanks,  Chely

## 2019-09-25 NOTE — SUBJECTIVE & OBJECTIVE
Past Medical History:   Diagnosis Date    Asthma     COPD (chronic obstructive pulmonary disease)        Past Surgical History:   Procedure Laterality Date    CORONARY ANGIOGRAPHY N/A 5/28/2019    Procedure: ANGIOGRAM, CORONARY ARTERY;  Surgeon: Nikunj Pabon MD;  Location: [facility] CATH LAB/EP;  Service: Cardiology;  Laterality: N/A;    LEFT HEART CATHETERIZATION N/A 5/28/2019    Procedure: Left heart cath;  Surgeon: Nikunj Pabon MD;  Location: [facility] CATH LAB/EP;  Service: Cardiology;  Laterality: N/A;       Review of patient's allergies indicates:  No Known Allergies    No current facility-administered medications on file prior to encounter.      Current Outpatient Medications on File Prior to Encounter   Medication Sig    albuterol (ACCUNEB) 0.63 mg/3 mL Nebu Take 3 mLs (0.63 mg total) by nebulization every 6 (six) hours as needed.    albuterol (PROAIR HFA) 90 mcg/actuation inhaler Inhale 2 puffs into the lungs every 6 (six) hours as needed for Wheezing or Shortness of Breath (or cough).    aspirin (ECOTRIN) 81 MG EC tablet Take 1 tablet (81 mg total) by mouth once daily.    budesonide-formoterol 160-4.5 mcg (SYMBICORT) 160-4.5 mcg/actuation HFAA Inhale 2 puffs into the lungs every 12 (twelve) hours.    fluticasone propionate (FLONASE) 50 mcg/actuation nasal spray spray 2 sprays (100 mcg total) by Each Nare route once daily.    sildenafil (VIAGRA) 50 MG tablet Take 1 tablet (50 mg total) by mouth daily as needed for Erectile Dysfunction.     Family History     None        Tobacco Use    Smoking status: Never Smoker    Smokeless tobacco: Never Used   Substance and Sexual Activity    Alcohol use: Yes     Alcohol/week: 9.0 standard drinks     Types: 9 Cans of beer per week     Comment: Stated he drinks on Friday, Saturday, & Sunday.     Drug use: No    Sexual activity: Not on file     Review of Systems   All other systems reviewed and are negative.    Objective:     Vital Signs (Most Recent):  Temp:  96.6 °F (35.9 °C) (09/25/19 1027)  Pulse: 71 (09/25/19 1027)  Resp: 18 (09/25/19 1027)  BP: (!) 143/93 (09/25/19 1029)  SpO2: 96 % (09/25/19 1027) Vital Signs (24h Range):  Temp:  [96.6 °F (35.9 °C)] 96.6 °F (35.9 °C)  Pulse:  [71] 71  Resp:  [18] 18  SpO2:  [96 %] 96 %  BP: (143-145)/(93-95) 143/93       Weight: 67.6 kg (149 lb)  Body mass index is 24.05 kg/m².    SpO2: 96 %  O2 Device (Oxygen Therapy): room air    Physical Exam   Constitutional: He is oriented to person, place, and time. He appears well-developed and well-nourished. No distress.   HENT:   Head: Normocephalic and atraumatic.   Neck: No JVD present.   Cardiovascular: Normal rate, regular rhythm, normal heart sounds and intact distal pulses. Exam reveals no gallop and no friction rub.   No murmur heard.  Pulmonary/Chest: Effort normal. No respiratory distress. He has no wheezes.   Bibasilar crackles   Abdominal: Soft. Bowel sounds are normal. He exhibits no distension. There is no tenderness.   Musculoskeletal: He exhibits no edema.   Neurological: He is alert and oriented to person, place, and time.   Skin: He is not diaphoretic.       Significant Labs:     Recent Results (from the past 24 hour(s))   CBC auto differential    Collection Time: 09/25/19  9:47 AM   Result Value Ref Range    WBC 5.26 3.90 - 12.70 K/uL    RBC 5.12 4.60 - 6.20 M/uL    Hemoglobin 13.6 (L) 14.0 - 18.0 g/dL    Hematocrit 44.3 40.0 - 54.0 %    Mean Corpuscular Volume 87 82 - 98 fL    Mean Corpuscular Hemoglobin 26.6 (L) 27.0 - 31.0 pg    Mean Corpuscular Hemoglobin Conc 30.7 (L) 32.0 - 36.0 g/dL    RDW 15.4 (H) 11.5 - 14.5 %    Platelets 141 (L) 150 - 350 K/uL    MPV 14.1 (H) 9.2 - 12.9 fL    Immature Granulocytes 0.2 0.0 - 0.5 %    Gran # (ANC) 4.7 1.8 - 7.7 K/uL    Immature Grans (Abs) 0.01 0.00 - 0.04 K/uL    Lymph # 0.5 (L) 1.0 - 4.8 K/uL    Mono # 0.1 (L) 0.3 - 1.0 K/uL    Eos # 0.0 0.0 - 0.5 K/uL    Baso # 0.01 0.00 - 0.20 K/uL    nRBC 0 0 /100 WBC    Gran% 88.7 (H) 38.0  - 73.0 %    Lymph% 8.6 (L) 18.0 - 48.0 %    Mono% 1.9 (L) 4.0 - 15.0 %    Eosinophil% 0.4 0.0 - 8.0 %    Basophil% 0.2 0.0 - 1.9 %    Differential Method Automated    APTT    Collection Time: 09/25/19 11:38 AM   Result Value Ref Range    aPTT 24.7 21.0 - 32.0 sec   Protime-INR    Collection Time: 09/25/19 11:38 AM   Result Value Ref Range    Prothrombin Time 10.6 9.0 - 12.5 sec    INR 1.0 0.8 - 1.2         Significant Imaging:     I have reviewed all pertinent imaging studies

## 2019-09-25 NOTE — Clinical Note
Heels and arms padded. Cushion placed under the patient's knees. Safety strap and warming blanket applied for patient comfort

## 2019-09-25 NOTE — HPI
63 year old male here for CRT-D implantation; he has a history of HFrEF/NICM (EF 10%, normal coronary arteries on LHC), sudden cardiac death, LBBB, HTN and COPD. Repeat echocardiography has revealed persistently depressed LVEF. He continues to experience shortness of breath, 2 pillow orthopnea and PND but denies peripheral edema. He reports no symptoms of angina and has not experienced syncope.    Last dose lisinopril 9/24/19    TTE 8/27/19    Conclusion     · Severely decreased left ventricular systolic function. The estimated ejection fraction is 15%  · Mildly reduced right ventricular systolic function.  · Severe left atrial enlargement.  · Moderate right atrial enlargement.  · Intermediate central venous pressure (8 mm Hg).

## 2019-09-25 NOTE — ASSESSMENT & PLAN NOTE
NYHA III HFrEF (15%),  ms and history of SCD  CRT-D implantation today  The risks and benefits of cardiac device implantation were discussed with the patient and his family and informed consent was obtained. Risks included but were not limited to: scarring, bleeding, infection, pneumothorax, cardiac perforation, pericardial tamponade, the possible requirement for emergency surgery, MI, stroke and death. The patient understands these risks and wishes to proceed.

## 2019-09-26 ENCOUNTER — DOCUMENTATION ONLY (OUTPATIENT)
Dept: CARDIOLOGY | Facility: HOSPITAL | Age: 64
End: 2019-09-26

## 2019-09-26 VITALS
HEART RATE: 74 BPM | OXYGEN SATURATION: 97 % | TEMPERATURE: 98 F | WEIGHT: 149 LBS | RESPIRATION RATE: 18 BRPM | DIASTOLIC BLOOD PRESSURE: 87 MMHG | SYSTOLIC BLOOD PRESSURE: 137 MMHG | HEIGHT: 66 IN | BODY MASS INDEX: 23.95 KG/M2

## 2019-09-26 PROCEDURE — 93010 ELECTROCARDIOGRAM REPORT: CPT | Mod: ,,, | Performed by: INTERNAL MEDICINE

## 2019-09-26 PROCEDURE — 25000003 PHARM REV CODE 250: Performed by: INTERNAL MEDICINE

## 2019-09-26 PROCEDURE — 93010 EKG 12-LEAD: ICD-10-PCS | Mod: ,,, | Performed by: INTERNAL MEDICINE

## 2019-09-26 PROCEDURE — 93005 ELECTROCARDIOGRAM TRACING: CPT

## 2019-09-26 PROCEDURE — 63600175 PHARM REV CODE 636 W HCPCS: Performed by: INTERNAL MEDICINE

## 2019-09-26 RX ORDER — CEPHALEXIN 500 MG/1
500 CAPSULE ORAL 3 TIMES DAILY
Qty: 15 CAPSULE | Refills: 0 | Status: SHIPPED | OUTPATIENT
Start: 2019-09-26 | End: 2019-10-01

## 2019-09-26 RX ADMIN — ASPIRIN 81 MG: 81 TABLET, COATED ORAL at 08:09

## 2019-09-26 RX ADMIN — LOSARTAN POTASSIUM 12.5 MG: 25 TABLET, FILM COATED ORAL at 08:09

## 2019-09-26 RX ADMIN — CEFAZOLIN 2 G: 1 INJECTION, POWDER, FOR SOLUTION INTRAMUSCULAR; INTRAVENOUS at 05:09

## 2019-09-26 RX ADMIN — CARVEDILOL 3.12 MG: 3.12 TABLET, FILM COATED ORAL at 08:09

## 2019-09-26 RX ADMIN — ACETAMINOPHEN 650 MG: 325 TABLET ORAL at 05:09

## 2019-09-26 NOTE — HOSPITAL COURSE
Mr. Quintanilla underwent successful CRT-D implantation, without complication. He was monitored overnight with no acute issues. He was discharged home in stable condition with a 5 day course of prophylactic antibiotics and will follow-up in device clinic in one week.

## 2019-09-26 NOTE — DISCHARGE SUMMARY
[facility]  Cardiac Electrophysiology  Discharge Summary      Patient Name: Dima Quintanilla  MRN: [id number]  Admission Date: 9/25/2019  Hospital Length of Stay: 0 days  Discharge Date and Time:  09/26/2019 6:39 AM  Attending Physician: Yaniv Townsend MD    Discharging Provider: Syed Olmstead MD  Primary Care Physician: Sweetie Riojas MD    HPI:   63 year old male here for CRT-D implantation; he has a history of HFrEF/NICM (EF 10%, normal coronary arteries on [facility] [place]), sudden cardiac death, LBBB, HTN and COPD. Repeat echocardiography has revealed persistently depressed LVEF. He continues to experience shortness of breath, 2 pillow orthopnea and PND but denies peripheral edema. He reports no symptoms of angina and has not experienced syncope.    Last dose lisinopril 9/24/19    TTE 8/27/19    Conclusion     · Severely decreased left ventricular systolic function. The estimated ejection fraction is 15%  · Mildly reduced right ventricular systolic function.  · Severe left atrial enlargement.  · Moderate right atrial enlargement.  · Intermediate central venous pressure (8 mm Hg).      Procedure(s) (LRB):  INSERTION, ICD, BIVENTRICULAR (N/A)     Indwelling Lines/Drains at time of discharge:  Lines/Drains/Airways     None                 Hospital Course:  Mr. Quintanilla underwent successful CRT-D implantation, without complication. He was monitored overnight with no acute issues. He was discharged home in stable condition with a 5 day course of prophylactic antibiotics and will follow-up in device clinic in one week.     Gen: NAD  HEENT: NCAT, PERRLA  CVS: RRR, normal s1/s2, no m/r/g. Pacemaker pocket c/d/i  Resp: CTAB  Abd: soft, NTND  Ext: no edema    - Sling to left arm - wear for 48 hours, then only at night for 6 weeks.  - NO HEPARIN PRODUCTS  - Keflex 500 mg TID for five days   - No lifting left elbow above shoulder height  - No lifting over 5 pounds  - No driving for 1 week and for 4 weeks if patient uses  left arm to make turns  - Patient may shower in 48 hours, do not let beam of shower hit site directly and no scrubbing in area  - Follow up in device clinic in 1 week      Consults:     Significant Diagnostic Studies:   Recent Results (from the past 24 hour(s))   CBC auto differential    Collection Time: 09/25/19  9:47 AM   Result Value Ref Range    WBC 5.26 3.90 - 12.70 K/uL    RBC 5.12 4.60 - 6.20 M/uL    Hemoglobin 13.6 (L) 14.0 - 18.0 g/dL    Hematocrit 44.3 40.0 - 54.0 %    Mean Corpuscular Volume 87 82 - 98 fL    Mean Corpuscular Hemoglobin 26.6 (L) 27.0 - 31.0 pg    Mean Corpuscular Hemoglobin Conc 30.7 (L) 32.0 - 36.0 g/dL    RDW 15.4 (H) 11.5 - 14.5 %    Platelets 141 (L) 150 - 350 K/uL    MPV 14.1 (H) 9.2 - 12.9 fL    Immature Granulocytes 0.2 0.0 - 0.5 %    Gran # (ANC) 4.7 1.8 - 7.7 K/uL    Immature Grans (Abs) 0.01 0.00 - 0.04 K/uL    Lymph # 0.5 (L) 1.0 - 4.8 K/uL    Mono # 0.1 (L) 0.3 - 1.0 K/uL    Eos # 0.0 0.0 - 0.5 K/uL    Baso # 0.01 0.00 - 0.20 K/uL    nRBC 0 0 /100 WBC    Gran% 88.7 (H) 38.0 - 73.0 %    Lymph% 8.6 (L) 18.0 - 48.0 %    Mono% 1.9 (L) 4.0 - 15.0 %    Eosinophil% 0.4 0.0 - 8.0 %    Basophil% 0.2 0.0 - 1.9 %    Differential Method Automated    APTT    Collection Time: 09/25/19 11:38 AM   Result Value Ref Range    aPTT 24.7 21.0 - 32.0 sec   Protime-INR    Collection Time: 09/25/19 11:38 AM   Result Value Ref Range    Prothrombin Time 10.6 9.0 - 12.5 sec    INR 1.0 0.8 - 1.2   Basic metabolic panel    Collection Time: 09/25/19 12:16 PM   Result Value Ref Range    Sodium 142 136 - 145 mmol/L    Potassium 4.4 3.5 - 5.1 mmol/L    Chloride 106 95 - 110 mmol/L    CO2 30 (H) 23 - 29 mmol/L    Glucose 122 (H) 70 - 110 mg/dL    BUN, Bld 12 8 - 23 mg/dL    Creatinine 1.3 0.5 - 1.4 mg/dL    Calcium 8.6 (L) 8.7 - 10.5 mg/dL    Anion Gap 6 (L) 8 - 16 mmol/L    eGFR if African American >60.0 >60 mL/min/1.73 m^2    eGFR if non  58.5 (A) >60 mL/min/1.73 m^2         Pending Diagnostic  Studies:     Procedure Component Value Units Date/Time    EKG 12-lead [[id number]]     Order Status:  Sent Lab Status:  No result           Final Active Diagnoses:    Diagnosis Date Noted POA    Chronic combined systolic and diastolic congestive heart failure [I50.42] 05/25/2019 Yes      Problems Resolved During this Admission:     No new Assessment & Plan notes have been filed under this hospital service since the last note was generated.  Service: Arrhythmia      Discharged Condition: fair    Disposition: Home or Self Care    Follow Up:  Follow-up Information     Yaniv Townsend MD In 3 months.    Specialties:  Electrophysiology, Cardiology  Contact information:  [address]  [facility] [address]  [phone]                 Patient Instructions:      Diet Cardiac     Weight bearing restrictions (specify):   Order Comments: Do not lift over 5-10 pounds for one week     Medications:  Reconciled Home Medications:      Medication List      START taking these medications    cephALEXin 500 MG capsule  Commonly known as:  KEFLEX  Take 1 capsule (500 mg total) by mouth 3 (three) times daily. for 5 days        CONTINUE taking these medications    * albuterol 0.63 mg/3 mL Nebu  Commonly known as:  ACCUNEB  Take 3 mLs (0.63 mg total) by nebulization every 6 (six) hours as needed.     * albuterol 90 mcg/actuation inhaler  Commonly known as:  PROAIR HFA  Inhale 2 puffs into the lungs every 6 (six) hours as needed for Wheezing or Shortness of Breath (or cough).     aspirin 81 MG EC tablet  Commonly known as:  ECOTRIN  Take 1 tablet (81 mg total) by mouth once daily.     budesonide-formoterol 160-4.5 mcg 160-4.5 mcg/actuation Hfaa  Commonly known as:  SYMBICORT  Inhale 2 puffs into the lungs every 12 (twelve) hours.     carvedilol 3.125 MG tablet  Commonly known as:  COREG  Take 1 tablet (3.125 mg total) by mouth 2 (two) times daily.     fluticasone propionate 50 mcg/actuation nasal spray  Commonly known as:   FLONASE  spray 2 sprays (100 mcg total) by Each Nare route once daily.     furosemide 20 MG tablet  Commonly known as:  LASIX  Take 1 tablet (20 mg total) by mouth once daily.     losartan 25 MG tablet  Commonly known as:  COZAAR  Take 0.5 tablets (12.5 mg total) by mouth once daily.     sildenafil 50 MG tablet  Commonly known as:  VIAGRA  Take 1 tablet (50 mg total) by mouth daily as needed for Erectile Dysfunction.         * This list has 2 medication(s) that are the same as other medications prescribed for you. Read the directions carefully, and ask your doctor or other care provider to review them with you.                Time spent on the discharge of patient: 30 minutes    Syed Olmstead MD  Cardiac Electrophysiology  [facility]

## 2019-09-26 NOTE — NURSING TRANSFER
Nursing Transfer Note      9/25/2019     Transfer To: [id number]    Transfer via stretcher    Transfer with cardiac monitoring    Transported by pct    Medicines sent: no    Chart send with patient: Yes    Notified: family

## 2019-09-26 NOTE — PLAN OF CARE
Patient progressing toward all goals. Plan of care discussed with patient, no questions at this time. Patient ambulating independently, fall precautions in place. No c/o of pain or discomfort. Pulses 2+, dressing CDI. Sling in place. Patient to be discharged home today.Will monitor.

## 2019-09-26 NOTE — PLAN OF CARE
Pt AAO x3, NAD, s/p ICD on 9/25/19 per Dr Townsend, Lt CW dsg CDI, no active bleeding and no hematoma noted. Pt voids and ambulates without any difficulty. Call light within pt reach, pt instructed to call staff for any needed assistance, safety maintained. Pt denies needs/concerns at this time, will continue to monitor.

## 2019-09-26 NOTE — DISCHARGE INSTRUCTIONS
- Wear your sling for the first 24 hours after your discharge and avoid getting your wound wet.  - You may shower in 48 hours, but do not let beam of shower hit site directly and no scrubbing in area  - For the first 6 weeks, while your implanted leads become permanently fixed, we ask that you avoid raising your left elbow above your shoulder and lifting greater than 5-10 lbs, and strongly urge that you wear your sling at night time during that time while you sleep to avoid excessive movement.  - We will schedule a visit to our wound clinic 1 week after your procedure for close follow up, in the interim period please keep your wound as clean & dry as possible, If you notice any discharge,worsening tenderness or discomfort from the area please call our clinic ASAP.  - Please avoid lifting more then 5 lbs with the involved side.  - No driving for 1 week and for 4 weeks if patient uses Left arm to make turns  - You have received antibiotics during your hospital stay and will continue these on discharge for 5 days.   - You may use over the counter medications for pain relief, if that is not sufficient your physician may have prescribed you additional pain medication-take as instructed  - Please resume your home medication.   - Follow with Dr. Townsend in 3 months post procedure, we will call you with an appointment.

## 2019-09-26 NOTE — PROGRESS NOTES
Cardiac device interrogation and/or reprogramming completed by industry representative, JOCE/ Aiden Vargas; please refer to report located in the Cards Procedure tab.

## 2019-10-02 ENCOUNTER — CLINICAL SUPPORT (OUTPATIENT)
Dept: CARDIOLOGY | Facility: HOSPITAL | Age: 64
End: 2019-10-02
Attending: INTERNAL MEDICINE
Payer: MEDICARE

## 2019-10-02 DIAGNOSIS — I44.7 LBBB (LEFT BUNDLE BRANCH BLOCK): ICD-10-CM

## 2019-10-02 DIAGNOSIS — I50.42 CHRONIC COMBINED SYSTOLIC AND DIASTOLIC CONGESTIVE HEART FAILURE: ICD-10-CM

## 2019-10-07 ENCOUNTER — TELEPHONE (OUTPATIENT)
Dept: ELECTROPHYSIOLOGY | Facility: CLINIC | Age: 64
End: 2019-10-07

## 2019-10-07 ENCOUNTER — TELEPHONE (OUTPATIENT)
Dept: CARDIOLOGY | Facility: HOSPITAL | Age: 64
End: 2019-10-07

## 2019-10-07 DIAGNOSIS — I44.7 LBBB (LEFT BUNDLE BRANCH BLOCK): Primary | Chronic | ICD-10-CM

## 2019-10-10 ENCOUNTER — TELEPHONE (OUTPATIENT)
Dept: CARDIOLOGY | Facility: HOSPITAL | Age: 64
End: 2019-10-10

## 2019-10-28 ENCOUNTER — OUTSIDE PLACE OF SERVICE (OUTPATIENT)
Dept: CARDIOLOGY | Facility: CLINIC | Age: 64
End: 2019-10-28
Payer: MEDICARE

## 2019-10-28 PROCEDURE — 93010 ELECTROCARDIOGRAM REPORT: CPT | Mod: ,,, | Performed by: INTERNAL MEDICINE

## 2019-10-28 PROCEDURE — 93010 PR ELECTROCARDIOGRAM REPORT: ICD-10-PCS | Mod: ,,, | Performed by: INTERNAL MEDICINE

## 2019-12-13 ENCOUNTER — TELEPHONE (OUTPATIENT)
Dept: ELECTROPHYSIOLOGY | Facility: CLINIC | Age: 64
End: 2019-12-13

## 2019-12-16 ENCOUNTER — TELEPHONE (OUTPATIENT)
Dept: ELECTROPHYSIOLOGY | Facility: CLINIC | Age: 64
End: 2019-12-16

## 2019-12-29 ENCOUNTER — CLINICAL SUPPORT (OUTPATIENT)
Dept: CARDIOLOGY | Facility: HOSPITAL | Age: 64
End: 2019-12-29
Payer: MEDICARE

## 2019-12-29 PROCEDURE — 93295 DEV INTERROG REMOTE 1/2/MLT: CPT | Mod: ,,, | Performed by: INTERNAL MEDICINE

## 2019-12-29 PROCEDURE — 93295 CARDIAC DEVICE CHECK - REMOTE: ICD-10-PCS | Mod: ,,, | Performed by: INTERNAL MEDICINE

## 2019-12-29 PROCEDURE — 93296 REM INTERROG EVL PM/IDS: CPT | Performed by: INTERNAL MEDICINE

## 2020-01-10 NOTE — PROGRESS NOTES
Mr. Quintanilla is a patient of Dr. Townsend and was last seen in clinic 8/23/2019.      Subjective:   Patient ID:  Dima Quintanilla is a 64 y.o. male who presents for follow-up of Cardiomyopathy  .     HPI:    Mr. Quintanilla is a 64 y.o. male with NICM, LBBB, HTN, COPD here for follow up after device implantation.     Background:    Referring Cardiac Practitioner: Xin Flores NP  Primary Care Physician: Sweetie Riojas MD    Mr. Quintanilla has a history of nonischemic dilated systolic cardiomyopathy (LVEF 10% 5/2019, normal coronary arteries on angiography 5/2019), LBBB, hypertension and COPD. He presented to [facility] on 5/25/2019 with symptoms of congestive heart failure. He was initiated on diuretic and beta-blocker therapy. He was chronically taking lisinopril. He presented again to the ER on 8/8/2019 with shortness of breath and was given IV lasix with symptomatic improvement. A repeat ECHO on 8/16/2019 noted LVEF of 15%.     ECGs in Epic which note sinus rhythm with LBBB (QRS ~150ms)  His EF remains severely depressed despite 3 months of goal directed medical therapy (ECHO done day 83 post initiation of beta-blocker therapy). Discussed the etiology and pathophysiology of sudden cardiac death in a patient population such as his and how an ICD may provide mortality benefit. Also discussed how a LBBB may impact LV systolic function and how cardiac resynchronization therapy may provide both morbidity and mortality benefit.  Recommended CRT-D implantation.     Update (01/13/2020):    9/25/2019: Successful implantation of ICD BIV.    Today he complains of fatigue. He says he is struggling with fluid and eating a lower salt diet. He denies CP, LH, syncope. Has chronic LEMON. NYHA III.    Device Interrogation (1/13/20) reveals an intrinsic SR in 80s with stable lead and device function. NSATx1, 4 seconds. He paces <1% in the RA and 90% in the BiV. Estimated battery longevity 4.1 years.     I have personally reviewed the patient's EKG  today, which shows ASVP at 86bpm. AK interval is 148. QRS is 146. QTc is 550.     Recent Cardiac Tests:    2D Echo (9/27/2019):  · Severely decreased left ventricular systolic function. The estimated ejection fraction is 15%  · Mildly reduced right ventricular systolic function.  · Severe left atrial enlargement.  · Moderate right atrial enlargement.  · Intermediate central venous pressure (8 mm Hg).    Current Outpatient Medications   Medication Sig    albuterol (ACCUNEB) 0.63 mg/3 mL Nebu Take 3 mLs (0.63 mg total) by nebulization every 6 (six) hours as needed.    albuterol (PROAIR HFA) 90 mcg/actuation inhaler Inhale 2 puffs into the lungs every 6 (six) hours as needed for Wheezing or Shortness of Breath (or cough).    aspirin (ECOTRIN) 81 MG EC tablet Take 1 tablet (81 mg total) by mouth once daily.    carvedilol (COREG) 3.125 MG tablet Take 1 tablet (3.125 mg total) by mouth 2 (two) times daily.    fluticasone propionate (FLONASE) 50 mcg/actuation nasal spray spray 2 sprays (100 mcg total) by Each Nare route once daily.    furosemide (LASIX) 20 MG tablet Take 1 tablet (20 mg total) by mouth once daily.    losartan (COZAAR) 25 MG tablet Take 0.5 tablets (12.5 mg total) by mouth once daily.    sildenafil (VIAGRA) 50 MG tablet Take 1 tablet (50 mg total) by mouth daily as needed for Erectile Dysfunction.    budesonide-formoterol 160-4.5 mcg (SYMBICORT) 160-4.5 mcg/actuation HFAA Inhale 2 puffs into the lungs every 12 (twelve) hours.     No current facility-administered medications for this visit.        Review of Systems   Constitution: Positive for malaise/fatigue.   Cardiovascular: Positive for dyspnea on exertion. Negative for chest pain, irregular heartbeat, leg swelling and palpitations.   Respiratory: Negative for shortness of breath.    Hematologic/Lymphatic: Negative for bleeding problem.   Skin: Negative for rash.   Musculoskeletal: Negative for myalgias.   Gastrointestinal: Negative for  "hematemesis, hematochezia and nausea.   Genitourinary: Negative for hematuria.   Neurological: Negative for light-headedness.   Psychiatric/Behavioral: Negative for altered mental status.   Allergic/Immunologic: Negative for persistent infections.     Objective:        /84   Pulse 83   Ht 5' 6" (1.676 m)   Wt 67.1 kg (147 lb 14.9 oz)   BMI 23.88 kg/m²     Physical Exam   Constitutional: He is oriented to person, place, and time. He appears well-developed and well-nourished.   HENT:   Head: Normocephalic.   Nose: Nose normal.   Eyes: Pupils are equal, round, and reactive to light.   Cardiovascular: Normal rate, regular rhythm, S1 normal and S2 normal.   No murmur heard.  Pulses:       Radial pulses are 2+ on the right side, and 2+ on the left side.   Pulmonary/Chest: Breath sounds normal. No respiratory distress.   Device to LUCW. Incision well healed. See media.   Abdominal: Normal appearance.   Musculoskeletal: Normal range of motion. He exhibits no edema.   Neurological: He is alert and oriented to person, place, and time.   Skin: Skin is warm and dry. No erythema.   Psychiatric: He has a normal mood and affect. His speech is normal and behavior is normal.   Nursing note and vitals reviewed.    Lab Results   Component Value Date     09/25/2019    K 4.4 09/25/2019    MG 2.3 08/08/2019    BUN 12 09/25/2019    CREATININE 1.3 09/25/2019    ALT 31 08/08/2019    AST 29 08/08/2019    HGB 13.6 (L) 09/25/2019    HCT 44.3 09/25/2019    TSH 2.383 06/03/2019    LDLCALC 103.4 05/27/2019       Recent Labs   Lab 05/25/19  1137 05/28/19  0322 08/08/19  1131 09/25/19  1138   INR 1.1 1.0 1.0 1.0      Assessment:     1. Presence of cardiac resynchronization therapy defibrillator (CRT-D)    2. NICM (nonischemic cardiomyopathy)    3. LBBB (left bundle branch block)    4. Essential hypertension      Plan:     In summary, Mr. Quintanilla is a 64 y.o. male with NICM, LBBB, HTN, COPD here for follow up after device implantation. "   Mr. Quintanilla is 3 months s/p CRT-D implantation. He is doing well from a device perspective with stable lead and device function. 90% biventricular pacing but EKG showing possible V sensed beats. Will increase BB and monitor closely. No sustained arrhythmia noted. Patient struggling with low salt diet. Printed diet provided and patient instructed to follow up with general cardiologist in [place] for CHF maintenance.     Increase carvedilol to 6.25mg BID.    Low salt diet   Echo prior to next visit.   Continue current medication regimen and device settings.   Follow up in device clinic as scheduled.   Follow up in EP clinic in 1 year, sooner as needed.     *A copy of this note has been sent to Dr. Townsend*    Follow up in about 1 year (around 1/13/2021).    ------------------------------------------------------------------    FERNANDA Payne, NP-C  Cardiac Electrophysiology

## 2020-01-13 ENCOUNTER — CLINICAL SUPPORT (OUTPATIENT)
Dept: CARDIOLOGY | Facility: HOSPITAL | Age: 65
End: 2020-01-13
Attending: INTERNAL MEDICINE
Payer: MEDICARE

## 2020-01-13 ENCOUNTER — OFFICE VISIT (OUTPATIENT)
Dept: ELECTROPHYSIOLOGY | Facility: CLINIC | Age: 65
End: 2020-01-13
Payer: MEDICARE

## 2020-01-13 VITALS
HEIGHT: 66 IN | SYSTOLIC BLOOD PRESSURE: 130 MMHG | WEIGHT: 147.94 LBS | BODY MASS INDEX: 23.77 KG/M2 | HEART RATE: 83 BPM | DIASTOLIC BLOOD PRESSURE: 84 MMHG

## 2020-01-13 DIAGNOSIS — I10 ESSENTIAL HYPERTENSION: ICD-10-CM

## 2020-01-13 DIAGNOSIS — I42.8 NICM (NONISCHEMIC CARDIOMYOPATHY): ICD-10-CM

## 2020-01-13 DIAGNOSIS — Z95.810 PRESENCE OF CARDIAC RESYNCHRONIZATION THERAPY DEFIBRILLATOR (CRT-D): Primary | ICD-10-CM

## 2020-01-13 DIAGNOSIS — I44.7 LBBB (LEFT BUNDLE BRANCH BLOCK): Chronic | ICD-10-CM

## 2020-01-13 PROCEDURE — 93005 ELECTROCARDIOGRAM TRACING: CPT | Mod: PBBFAC | Performed by: INTERNAL MEDICINE

## 2020-01-13 PROCEDURE — 93010 RHYTHM STRIP: ICD-10-PCS | Mod: S$PBB,,, | Performed by: INTERNAL MEDICINE

## 2020-01-13 PROCEDURE — 93010 ELECTROCARDIOGRAM REPORT: CPT | Mod: S$PBB,,, | Performed by: INTERNAL MEDICINE

## 2020-01-13 PROCEDURE — 99214 OFFICE O/P EST MOD 30 MIN: CPT | Mod: S$PBB,,, | Performed by: NURSE PRACTITIONER

## 2020-01-13 PROCEDURE — 93284 PRGRMG EVAL IMPLANTABLE DFB: CPT

## 2020-01-13 PROCEDURE — 99214 PR OFFICE/OUTPT VISIT, EST, LEVL IV, 30-39 MIN: ICD-10-PCS | Mod: S$PBB,,, | Performed by: NURSE PRACTITIONER

## 2020-01-13 PROCEDURE — 99213 OFFICE O/P EST LOW 20 MIN: CPT | Mod: PBBFAC,25 | Performed by: NURSE PRACTITIONER

## 2020-01-13 PROCEDURE — 99999 PR PBB SHADOW E&M-EST. PATIENT-LVL III: CPT | Mod: PBBFAC,,, | Performed by: NURSE PRACTITIONER

## 2020-01-13 PROCEDURE — 99999 PR PBB SHADOW E&M-EST. PATIENT-LVL III: ICD-10-PCS | Mod: PBBFAC,,, | Performed by: NURSE PRACTITIONER

## 2020-01-13 RX ORDER — CARVEDILOL 6.25 MG/1
6.25 TABLET ORAL 2 TIMES DAILY
Qty: 60 TABLET | Refills: 11 | Status: ON HOLD | OUTPATIENT
Start: 2020-01-13 | End: 2020-02-13 | Stop reason: HOSPADM

## 2020-01-13 NOTE — PATIENT INSTRUCTIONS
Increase carvedilol to 6.25mg twice daily.  Low salt diet.  Make appt with general cardiology in [place].          Low-Salt Diet  This diet removes foods that are high in salt. It also limits the amount of salt you use when cooking. It is most often used for people with high blood pressure, edema (fluid retention), and kidney, liver, or heart disease.  Table salt contains the mineral sodium. Your body needs sodium to work normally. But too much sodium can make your health problems worse. Your healthcare provider is recommending a low-salt (also called low-sodium) diet for you. Your total daily allowance of salt is 1,500 to 2,300 milligrams (mg). It is less than 1 teaspoon of table salt. This means you can have only about 500 to 700 mg of sodium at each meal. People with certain health problems should limit salt intake to the lower end of the recommended range.    When you cook, dont add much salt. If you can cook without using salt, even better. Dont add salt to your food at the table.  When shopping, read food labels. Salt is often called sodium on the label. Choose foods that are salt-free, low salt, or very low salt. Note that foods with reduced salt may not lower your salt intake enough.    Beans, potatoes, and pasta  Ok: Dry beans, split peas, lentils, potatoes, rice, macaroni, pasta, spaghetti without added salt  Avoid: Potato chips, tortilla chips, and similar products  Breads and cereals  Ok: Low-sodium breads, rolls, cereals, and cakes; low-salt crackers, matzo crackers  Avoid: Salted crackers, pretzels, popcorn, Sami toast, pancakes, muffins  Dairy  Ok: Milk, chocolate milk, hot chocolate mix, low-salt cheeses, and yogurt  Avoid: Processed cheese and cheese spreads; Roquefort, Camembert, and cottage cheese; buttermilk, instant breakfast drink  Desserts  Ok: Ice cream, frozen yogurt, juice bars, gelatin, cookies and pies, sugar, honey, jelly, hard candy  Avoid: Most pies, cakes and cookies prepared or  processed with salt; instant pudding  Drinks  Ok: Tea, coffee, fizzy (carbonated) drinks, juices  Avoid: Flavored coffees, electrolyte replacement drinks, sports drinks  Meats  Ok: All fresh meat, fish, poultry, low-salt tuna, eggs, egg substitute  Avoid: Smoked, pickled, brine-cured, or salted meats and fish. This includes hurd, chipped beef, corned beef, hot dogs, deli meats, ham, kosher meats, salt pork, sausage, canned tuna, salted codfish, smoked salmon, herring, sardines, or anchovies.  Seasonings and spices  Ok: Most seasonings are okay. Good substitutes for salt include: fresh herb blends, hot sauce, lemon, garlic, almendarez, vinegar, dry mustard, parsley, cilantro, horseradish, tomato paste, regular margarine, mayonnaise, unsalted butter, cream cheese, vegetable oil, cream, low-salt salad dressing and gravy.  Avoid: Regular ketchup, relishes, pickles, soy sauce, teriyaki sauce, Worcestershire sauce, BBQ sauce, tartar sauce, meat tenderizer, chili sauce, regular gravy, regular salad dressing, salted butter  Soups  Ok: Low-salt soups and broths made with allowed foods  Avoid: Bouillon cubes, soups with smoked or salted meats, regular soup and broth  Vegetables  Ok: Most vegetables are okay; also low-salt tomato and vegetable juices  Avoid: Sauerkraut and other brine-soaked vegetables; pickles and other pickled vegetables; tomato juice, olives  Date Last Reviewed: 8/1/2016  © 9830-3111 [employer]. [address] [place] [address], [place] [address]. All rights reserved. This information is not intended as a substitute for professional medical care. Always follow your healthcare professional's instructions.

## 2020-01-31 ENCOUNTER — TELEPHONE (OUTPATIENT)
Dept: ELECTROPHYSIOLOGY | Facility: CLINIC | Age: 65
End: 2020-01-31

## 2020-01-31 NOTE — TELEPHONE ENCOUNTER
----- Message from Daniella Isabel NP sent at 1/31/2020  1:56 PM CST -----  Contact: pt  Agusto. Didn't make any changes after his visit. I did want him to get an appt with general cardiology though - he might be trying to arrange that. - J    ----- Message -----  From: Tere Lubin MA  Sent: 1/31/2020   1:42 PM CST  To: Daniella Isabel NP    Hey did you call  ?   ----- Message -----  From: Korin Cochran  Sent: 1/31/2020  12:31 PM CST  To: Kristian WANG Staff    Pt returning missed call from the office, please call him at the number listed.    Thanks

## 2020-01-31 NOTE — TELEPHONE ENCOUNTER
----- Message from Korin Cochran sent at 1/31/2020 12:31 PM CST -----  Contact: pt  Pt returning missed call from the office, please call him at the number listed.    Thanks

## 2020-02-11 ENCOUNTER — HOSPITAL ENCOUNTER (INPATIENT)
Facility: HOSPITAL | Age: 65
LOS: 2 days | Discharge: HOME | DRG: 292 | End: 2020-02-14
Attending: EMERGENCY MEDICINE | Admitting: INTERNAL MEDICINE
Payer: MEDICARE

## 2020-02-11 DIAGNOSIS — I10 ESSENTIAL HYPERTENSION: ICD-10-CM

## 2020-02-11 DIAGNOSIS — R06.02 SHORTNESS OF BREATH: ICD-10-CM

## 2020-02-11 DIAGNOSIS — I50.20 HFREF (HEART FAILURE WITH REDUCED EJECTION FRACTION): ICD-10-CM

## 2020-02-11 DIAGNOSIS — I50.43 ACUTE ON CHRONIC COMBINED SYSTOLIC AND DIASTOLIC CONGESTIVE HEART FAILURE: Primary | ICD-10-CM

## 2020-02-11 DIAGNOSIS — J45.41 MODERATE PERSISTENT ASTHMA WITH EXACERBATION: ICD-10-CM

## 2020-02-11 DIAGNOSIS — I50.41 ACUTE COMBINED SYSTOLIC AND DIASTOLIC CONGESTIVE HEART FAILURE: ICD-10-CM

## 2020-02-11 DIAGNOSIS — I50.9 ACUTE ON CHRONIC CONGESTIVE HEART FAILURE, UNSPECIFIED HEART FAILURE TYPE: ICD-10-CM

## 2020-02-11 LAB
ALBUMIN SERPL BCP-MCNC: 3.2 G/DL (ref 3.5–5.2)
ALP SERPL-CCNC: 93 U/L (ref 55–135)
ALT SERPL W/O P-5'-P-CCNC: 21 U/L (ref 10–44)
ANION GAP SERPL CALC-SCNC: 6 MMOL/L (ref 8–16)
AST SERPL-CCNC: 21 U/L (ref 10–40)
BASOPHILS # BLD AUTO: 0.04 K/UL (ref 0–0.2)
BASOPHILS NFR BLD: 0.7 % (ref 0–1.9)
BILIRUB SERPL-MCNC: 0.9 MG/DL (ref 0.1–1)
BILIRUB UR QL STRIP: NEGATIVE
BNP SERPL-MCNC: 2671 PG/ML (ref 0–99)
BUN SERPL-MCNC: 15 MG/DL (ref 8–23)
CALCIUM SERPL-MCNC: 9.1 MG/DL (ref 8.7–10.5)
CHLORIDE SERPL-SCNC: 106 MMOL/L (ref 95–110)
CLARITY UR: CLEAR
CO2 SERPL-SCNC: 28 MMOL/L (ref 23–29)
COLOR UR: YELLOW
CREAT SERPL-MCNC: 1.4 MG/DL (ref 0.5–1.4)
DIFFERENTIAL METHOD: ABNORMAL
EOSINOPHIL # BLD AUTO: 0.1 K/UL (ref 0–0.5)
EOSINOPHIL NFR BLD: 2 % (ref 0–8)
ERYTHROCYTE [DISTWIDTH] IN BLOOD BY AUTOMATED COUNT: 17.6 % (ref 11.5–14.5)
EST. GFR  (AFRICAN AMERICAN): >60 ML/MIN/1.73 M^2
EST. GFR  (NON AFRICAN AMERICAN): 53 ML/MIN/1.73 M^2
GLUCOSE SERPL-MCNC: 78 MG/DL (ref 70–110)
GLUCOSE UR QL STRIP: NEGATIVE
HCT VFR BLD AUTO: 43.7 % (ref 40–54)
HGB BLD-MCNC: 14.1 G/DL (ref 14–18)
HGB UR QL STRIP: NEGATIVE
IMM GRANULOCYTES # BLD AUTO: 0.02 K/UL (ref 0–0.04)
IMM GRANULOCYTES NFR BLD AUTO: 0.4 % (ref 0–0.5)
KETONES UR QL STRIP: NEGATIVE
LEUKOCYTE ESTERASE UR QL STRIP: NEGATIVE
LYMPHOCYTES # BLD AUTO: 1.2 K/UL (ref 1–4.8)
LYMPHOCYTES NFR BLD: 21.9 % (ref 18–48)
MAGNESIUM SERPL-MCNC: 2 MG/DL (ref 1.6–2.6)
MCH RBC QN AUTO: 26.6 PG (ref 27–31)
MCHC RBC AUTO-ENTMCNC: 32.3 G/DL (ref 32–36)
MCV RBC AUTO: 82 FL (ref 82–98)
MONOCYTES # BLD AUTO: 0.5 K/UL (ref 0.3–1)
MONOCYTES NFR BLD: 9.6 % (ref 4–15)
NEUTROPHILS # BLD AUTO: 3.5 K/UL (ref 1.8–7.7)
NEUTROPHILS NFR BLD: 65.4 % (ref 38–73)
NITRITE UR QL STRIP: NEGATIVE
NRBC BLD-RTO: 0 /100 WBC
PH UR STRIP: 6 [PH] (ref 5–8)
PLATELET # BLD AUTO: 163 K/UL (ref 150–350)
PMV BLD AUTO: 13.2 FL (ref 9.2–12.9)
POTASSIUM SERPL-SCNC: 4.1 MMOL/L (ref 3.5–5.1)
PROT SERPL-MCNC: 6.3 G/DL (ref 6–8.4)
PROT UR QL STRIP: NEGATIVE
RBC # BLD AUTO: 5.31 M/UL (ref 4.6–6.2)
SODIUM SERPL-SCNC: 140 MMOL/L (ref 136–145)
SP GR UR STRIP: 1.01 (ref 1–1.03)
TROPONIN I SERPL DL<=0.01 NG/ML-MCNC: 0.06 NG/ML (ref 0–0.03)
TROPONIN I SERPL DL<=0.01 NG/ML-MCNC: 0.07 NG/ML (ref 0–0.03)
URN SPEC COLLECT METH UR: NORMAL
UROBILINOGEN UR STRIP-ACNC: NEGATIVE EU/DL
WBC # BLD AUTO: 5.39 K/UL (ref 3.9–12.7)

## 2020-02-11 PROCEDURE — G0378 HOSPITAL OBSERVATION PER HR: HCPCS

## 2020-02-11 PROCEDURE — 93010 ELECTROCARDIOGRAM REPORT: CPT | Mod: 76,,, | Performed by: STUDENT IN AN ORGANIZED HEALTH CARE EDUCATION/TRAINING PROGRAM

## 2020-02-11 PROCEDURE — 85025 COMPLETE CBC W/AUTO DIFF WBC: CPT

## 2020-02-11 PROCEDURE — 80053 COMPREHEN METABOLIC PANEL: CPT

## 2020-02-11 PROCEDURE — 93005 ELECTROCARDIOGRAM TRACING: CPT

## 2020-02-11 PROCEDURE — 96376 TX/PRO/DX INJ SAME DRUG ADON: CPT

## 2020-02-11 PROCEDURE — 93010 ELECTROCARDIOGRAM REPORT: CPT | Mod: ,,, | Performed by: STUDENT IN AN ORGANIZED HEALTH CARE EDUCATION/TRAINING PROGRAM

## 2020-02-11 PROCEDURE — 96374 THER/PROPH/DIAG INJ IV PUSH: CPT

## 2020-02-11 PROCEDURE — 84484 ASSAY OF TROPONIN QUANT: CPT | Mod: 91

## 2020-02-11 PROCEDURE — 25000003 PHARM REV CODE 250: Performed by: PHYSICIAN ASSISTANT

## 2020-02-11 PROCEDURE — 83880 ASSAY OF NATRIURETIC PEPTIDE: CPT

## 2020-02-11 PROCEDURE — 25000003 PHARM REV CODE 250: Performed by: STUDENT IN AN ORGANIZED HEALTH CARE EDUCATION/TRAINING PROGRAM

## 2020-02-11 PROCEDURE — 94761 N-INVAS EAR/PLS OXIMETRY MLT: CPT

## 2020-02-11 PROCEDURE — 99283 PR EMERGENCY DEPT VISIT,LEVEL III: ICD-10-PCS | Mod: ,,, | Performed by: INTERNAL MEDICINE

## 2020-02-11 PROCEDURE — 99285 EMERGENCY DEPT VISIT HI MDM: CPT | Mod: 25

## 2020-02-11 PROCEDURE — 63600175 PHARM REV CODE 636 W HCPCS: Performed by: STUDENT IN AN ORGANIZED HEALTH CARE EDUCATION/TRAINING PROGRAM

## 2020-02-11 PROCEDURE — 63600175 PHARM REV CODE 636 W HCPCS: Performed by: EMERGENCY MEDICINE

## 2020-02-11 PROCEDURE — 81003 URINALYSIS AUTO W/O SCOPE: CPT

## 2020-02-11 PROCEDURE — 84484 ASSAY OF TROPONIN QUANT: CPT

## 2020-02-11 PROCEDURE — 36415 COLL VENOUS BLD VENIPUNCTURE: CPT

## 2020-02-11 PROCEDURE — 96372 THER/PROPH/DIAG INJ SC/IM: CPT | Mod: 59

## 2020-02-11 PROCEDURE — 83735 ASSAY OF MAGNESIUM: CPT

## 2020-02-11 PROCEDURE — 93010 EKG 12-LEAD: ICD-10-PCS | Mod: ,,, | Performed by: STUDENT IN AN ORGANIZED HEALTH CARE EDUCATION/TRAINING PROGRAM

## 2020-02-11 PROCEDURE — 99283 EMERGENCY DEPT VISIT LOW MDM: CPT | Mod: ,,, | Performed by: INTERNAL MEDICINE

## 2020-02-11 RX ORDER — CARVEDILOL 6.25 MG/1
6.25 TABLET ORAL 2 TIMES DAILY
Status: DISCONTINUED | OUTPATIENT
Start: 2020-02-11 | End: 2020-02-11

## 2020-02-11 RX ORDER — BUDESONIDE AND FORMOTEROL FUMARATE DIHYDRATE 160; 4.5 UG/1; UG/1
2 AEROSOL RESPIRATORY (INHALATION) EVERY 12 HOURS
Status: DISCONTINUED | OUTPATIENT
Start: 2020-02-11 | End: 2020-02-11

## 2020-02-11 RX ORDER — CARVEDILOL 3.12 MG/1
3.12 TABLET ORAL 2 TIMES DAILY
Status: DISCONTINUED | OUTPATIENT
Start: 2020-02-12 | End: 2020-02-14 | Stop reason: HOSPADM

## 2020-02-11 RX ORDER — FUROSEMIDE 10 MG/ML
60 INJECTION INTRAMUSCULAR; INTRAVENOUS 2 TIMES DAILY
Status: DISCONTINUED | OUTPATIENT
Start: 2020-02-11 | End: 2020-02-13

## 2020-02-11 RX ORDER — FLUTICASONE PROPIONATE 50 MCG
2 SPRAY, SUSPENSION (ML) NASAL DAILY
Status: DISCONTINUED | OUTPATIENT
Start: 2020-02-12 | End: 2020-02-14 | Stop reason: HOSPADM

## 2020-02-11 RX ORDER — ENOXAPARIN SODIUM 100 MG/ML
40 INJECTION SUBCUTANEOUS EVERY 24 HOURS
Status: DISCONTINUED | OUTPATIENT
Start: 2020-02-11 | End: 2020-02-14 | Stop reason: HOSPADM

## 2020-02-11 RX ORDER — ASPIRIN 325 MG
325 TABLET, DELAYED RELEASE (ENTERIC COATED) ORAL
Status: COMPLETED | OUTPATIENT
Start: 2020-02-11 | End: 2020-02-11

## 2020-02-11 RX ORDER — SODIUM CHLORIDE 0.9 % (FLUSH) 0.9 %
10 SYRINGE (ML) INJECTION
Status: DISCONTINUED | OUTPATIENT
Start: 2020-02-11 | End: 2020-02-14 | Stop reason: HOSPADM

## 2020-02-11 RX ORDER — ATORVASTATIN CALCIUM 40 MG/1
40 TABLET, FILM COATED ORAL NIGHTLY
Status: DISCONTINUED | OUTPATIENT
Start: 2020-02-11 | End: 2020-02-14 | Stop reason: HOSPADM

## 2020-02-11 RX ORDER — IPRATROPIUM BROMIDE AND ALBUTEROL SULFATE 2.5; .5 MG/3ML; MG/3ML
3 SOLUTION RESPIRATORY (INHALATION) EVERY 4 HOURS PRN
Status: DISCONTINUED | OUTPATIENT
Start: 2020-02-11 | End: 2020-02-14 | Stop reason: HOSPADM

## 2020-02-11 RX ORDER — BUDESONIDE AND FORMOTEROL FUMARATE DIHYDRATE 160; 4.5 UG/1; UG/1
2 AEROSOL RESPIRATORY (INHALATION) EVERY 12 HOURS
Status: DISCONTINUED | OUTPATIENT
Start: 2020-02-12 | End: 2020-02-12

## 2020-02-11 RX ORDER — FUROSEMIDE 10 MG/ML
80 INJECTION INTRAMUSCULAR; INTRAVENOUS
Status: COMPLETED | OUTPATIENT
Start: 2020-02-11 | End: 2020-02-11

## 2020-02-11 RX ORDER — ASPIRIN 81 MG/1
81 TABLET ORAL DAILY
Status: DISCONTINUED | OUTPATIENT
Start: 2020-02-12 | End: 2020-02-14 | Stop reason: HOSPADM

## 2020-02-11 RX ORDER — FUROSEMIDE 10 MG/ML
80 INJECTION INTRAMUSCULAR; INTRAVENOUS 2 TIMES DAILY
Status: DISCONTINUED | OUTPATIENT
Start: 2020-02-11 | End: 2020-02-11

## 2020-02-11 RX ADMIN — FUROSEMIDE 80 MG: 10 INJECTION, SOLUTION INTRAVENOUS at 01:02

## 2020-02-11 RX ADMIN — ATORVASTATIN CALCIUM 40 MG: 20 TABLET, FILM COATED ORAL at 11:02

## 2020-02-11 RX ADMIN — ENOXAPARIN SODIUM 40 MG: 100 INJECTION SUBCUTANEOUS at 04:02

## 2020-02-11 RX ADMIN — CARVEDILOL 6.25 MG: 6.25 TABLET, FILM COATED ORAL at 08:02

## 2020-02-11 RX ADMIN — FUROSEMIDE 60 MG: 10 INJECTION, SOLUTION INTRAMUSCULAR; INTRAVENOUS at 09:02

## 2020-02-11 RX ADMIN — ASPIRIN 325 MG: 325 TABLET, COATED ORAL at 01:02

## 2020-02-11 NOTE — CONSULTS
[facility]  Cardiology  Consult Note    Patient Name: Dima Quintanilla  MRN: [id number]  Admission Date: 2/11/2020  Hospital Length of Stay: 0 days  Code Status: Prior   Attending Provider: Aj Beltran, *   Consulting Provider: Prieto Cruz NP  Primary Care Physician: Sweetie Riojas MD  Principal Problem:<principal problem not specified>    Patient information was obtained from patient, past medical records and ER records.     Inpatient consult to Cardiology  Consult performed by: Prieto Cruz NP  Consult ordered by: Lizz Laurent PA-C        Subjective:     Chief Complaint:  Edema, SOB     HPI:   Dima Quintanilla is a 64-year-old male with past medical history of COPD, HFrEF (EF of 15%) CRT-D, HTN, asthma, NICM who presented to ED with complaints of bilateral lower extremity swelling with progressive worsening over the last 3 days.  He reports that he has been taking Lasix 10 mg twice a day for the last 3 days without any improvement in swelling.  He reports that today he started feeling mildly short of breath with exertion.  Patient reports that he recently ate fried chicken and has been drinking plenty of fluids. Denies orthopnea, PND, chest pain, nausea, vomiting, abdominal distention, decreased urine output.  BNP 2671, edematous on exam    Past Medical History:   Diagnosis Date    Asthma     COPD (chronic obstructive pulmonary disease)        Past Surgical History:   Procedure Laterality Date    CORONARY ANGIOGRAPHY N/A 5/28/2019    Procedure: ANGIOGRAM, CORONARY ARTERY;  Surgeon: Nikunj Pabon MD;  Location: [facility] CATH LAB/EP;  Service: Cardiology;  Laterality: N/A;    INSERTION OF BIVENTRICULAR IMPLANTABLE CARDIOVERTER-DEFIBRILLATOR (ICD) N/A 9/25/2019    Procedure: INSERTION, ICD, BIVENTRICULAR;  Surgeon: Yaniv Townsend MD;  Location: [facility] EP LAB;  Service: Cardiology;  Laterality: N/A;  HF, CRT-D, SJM, MAC, RI, 3 Prep    LEFT HEART CATHETERIZATION N/A 5/28/2019     Procedure: Left heart cath;  Surgeon: Nikunj Pabon MD;  Location: [facility] CATH LAB/EP;  Service: Cardiology;  Laterality: N/A;       Review of patient's allergies indicates:  No Known Allergies    No current facility-administered medications on file prior to encounter.      Current Outpatient Medications on File Prior to Encounter   Medication Sig    albuterol (ACCUNEB) 0.63 mg/3 mL Nebu Take 3 mLs (0.63 mg total) by nebulization every 6 (six) hours as needed.    albuterol (PROAIR HFA) 90 mcg/actuation inhaler Inhale 2 puffs into the lungs every 6 (six) hours as needed for Wheezing or Shortness of Breath (or cough).    aspirin (ECOTRIN) 81 MG EC tablet Take 1 tablet (81 mg total) by mouth once daily.    budesonide-formoterol 160-4.5 mcg (SYMBICORT) 160-4.5 mcg/actuation HFAA Inhale 2 puffs into the lungs every 12 (twelve) hours.    carvedilol (COREG) 6.25 MG tablet Take 1 tablet (6.25 mg total) by mouth 2 (two) times daily.    fluticasone propionate (FLONASE) 50 mcg/actuation nasal spray spray 2 sprays (100 mcg total) by Each Nare route once daily.    furosemide (LASIX) 20 MG tablet Take 1 tablet (20 mg total) by mouth once daily.    losartan (COZAAR) 25 MG tablet Take 0.5 tablets (12.5 mg total) by mouth once daily.    sildenafil (VIAGRA) 50 MG tablet Take 1 tablet (50 mg total) by mouth daily as needed for Erectile Dysfunction.     Family History     None        Tobacco Use    Smoking status: Never Smoker    Smokeless tobacco: Never Used   Substance and Sexual Activity    Alcohol use: Yes     Alcohol/week: 9.0 standard drinks     Types: 9 Cans of beer per week     Comment: Stated he drinks on Friday, Saturday, & Sunday.     Drug use: No    Sexual activity: Not on file     Review of Systems   Constitution: Negative for malaise/fatigue.   HENT: Negative.    Eyes: Negative.    Cardiovascular: Positive for dyspnea on exertion and leg swelling. Negative for chest pain, irregular heartbeat,  near-syncope, orthopnea, palpitations, paroxysmal nocturnal dyspnea and syncope.   Respiratory: Positive for shortness of breath.    Endocrine: Negative.    Hematologic/Lymphatic: Negative.    Skin: Negative.    Musculoskeletal: Negative.    Gastrointestinal: Positive for bloating.   Neurological: Negative.    Psychiatric/Behavioral: Negative.    Allergic/Immunologic: Negative.      Objective:     Vital Signs (Most Recent):  Temp: 97.6 °F (36.4 °C) (02/11/20 1125)  Pulse: 87 (02/11/20 1420)  Resp: (!) 29 (02/11/20 1420)  BP: (!) 149/97 (02/11/20 1301)  SpO2: 99 % (02/11/20 1420) Vital Signs (24h Range):  Temp:  [97.6 °F (36.4 °C)] 97.6 °F (36.4 °C)  Pulse:  [84-93] 87  Resp:  [20-29] 29  SpO2:  [97 %-100 %] 99 %  BP: (134-149)/(82-97) 149/97     Weight: 67.6 kg (149 lb)  Body mass index is 24.05 kg/m².    SpO2: 99 %  O2 Device (Oxygen Therapy): room air      Intake/Output Summary (Last 24 hours) at 2/11/2020 1423  Last data filed at 2/11/2020 1400  Gross per 24 hour   Intake --   Output 300 ml   Net -300 ml       Lines/Drains/Airways     Peripheral Intravenous Line                 Peripheral IV - Single Lumen 02/11/20 1218 20 G Right Forearm less than 1 day                Physical Exam   Constitutional: He is oriented to person, place, and time. No distress.   HENT:   Head: Atraumatic.   Eyes: Right eye exhibits no discharge. Left eye exhibits no discharge.   Neck: JVD present.   Cardiovascular: Normal rate and regular rhythm. Exam reveals no gallop and no friction rub.   Murmur heard.  Pulmonary/Chest: Effort normal. He has decreased breath sounds.   Abdominal: Bowel sounds are normal. He exhibits distension.   Musculoskeletal: He exhibits edema.   Neurological: He is alert and oriented to person, place, and time.   Skin: Skin is warm and dry. He is not diaphoretic.   Psychiatric: He has a normal mood and affect. His behavior is normal. Judgment and thought content normal.       Significant Labs:   BMP:   Recent  Labs   Lab 02/11/20  1215   GLU 78      K 4.1      CO2 28   BUN 15   CREATININE 1.4   CALCIUM 9.1   MG 2.0   , CMP   Recent Labs   Lab 02/11/20  1215      K 4.1      CO2 28   GLU 78   BUN 15   CREATININE 1.4   CALCIUM 9.1   PROT 6.3   ALBUMIN 3.2*   BILITOT 0.9   ALKPHOS 93   AST 21   ALT 21   ANIONGAP 6*   ESTGFRAFRICA >60   EGFRNONAA 53*   , CBC   Recent Labs   Lab 02/11/20  1215   WBC 5.39   HGB 14.1   HCT 43.7      , INR No results for input(s): INR, PROTIME in the last 48 hours., Lipid Panel No results for input(s): CHOL, HDL, LDLCALC, TRIG, CHOLHDL in the last 48 hours., Troponin   Recent Labs   Lab 02/11/20  1215   TROPONINI 0.064*    and All pertinent lab results from the last 24 hours have been reviewed.    Significant Imaging: Echocardiogram:   Transthoracic echo (TTE) complete (Cupid Only):   Results for orders placed or performed during the hospital encounter of 08/16/19   Transthoracic echo (TTE) 2D with Color Flow   Result Value Ref Range    LV LATERAL E/E' RATIO 11.63 m/s    LA WIDTH 5.00 cm    AORTIC VALVE CUSP SEPERATION 1.98 cm    TDI LATERAL 0.08 m/s    PV PEAK VELOCITY 0.74 cm/s    LVIDD 5.65 3.5 - 6.0 cm    IVS 1.02 0.6 - 1.1 cm    PW 1.08 0.6 - 1.1 cm    Ao root annulus 2.81 cm    LVIDS 5.32 (A) 2.1 - 4.0 cm    FS 6 28 - 44 %    IVC proximal 1.6 cm    LA volume 81.44 cm3    Sinus 2.49 cm    LV mass 237.82 g    LA size 3.30 cm    RVDD 2.36 cm    TAPSE 1.95 cm    Left Ventricle Relative Wall Thickness 0.38 cm    AV mean gradient 2 mmHg    AV valve area 2.78 cm2    AV Velocity Ratio 0.89     AV index (prosthetic) 0.92     MV valve area by continuity eq 1.93 cm2    Pulm vein S/D ratio 0.50     LVOT diameter 1.96 cm    LVOT area 3.0 cm2    LVOT peak robert 0.79 m/s    LVOT peak VTI 12.17 cm    Ao peak robert 0.89 m/s    Ao VTI 13.19 cm    LVOT stroke volume 36.70 cm3    AV peak gradient 3 mmHg    MV Peak E Robert 0.93 m/s    TR Max Robert 2.94 m/s    MV VTI 19 cm    PV Peak S Robert  0.32 m/s    PV Peak D Robert 0.64 m/s    LV Systolic Volume 136.60 mL    LV Diastolic Volume 156.68 mL    RA Major Axis 4.83 cm    Left Atrium Minor Axis 5.95 cm    Left Atrium Major Axis 5.67 cm    Triscuspid Valve Regurgitation Peak Gradient 35 mmHg    RA Width 5.41 cm    Right Atrial Pressure (from IVC) 3 mmHg    TV rest pulmonary artery pressure 38 mmHg    Narrative    · Eccentric left ventricular hypertrophy.  · Moderate left ventricular enlargement.  · Severely decreased left ventricular systolic function. The estimated   ejection fraction is 15%  · Grade II (moderate) left ventricular diastolic dysfunction consistent   with pseudonormalization.  · Mild tricuspid regurgitation.  · Mild mitral regurgitation.  · Mildly reduced right ventricular systolic function.  · Severe left atrial enlargement.  · Moderate right atrial enlargement.  · Elevated left atrial pressure.  · Normal central venous pressure (3 mm Hg).  · The estimated PA systolic pressure is 38 mm Hg        Assessment and Plan:     Presence of cardiac resynchronization therapy defibrillator (CRT-D)  Recent interrogation with normal device function     Essential hypertension  SBP 130s-140s  Continue BB and ARB    Acute on chronic combined systolic and diastolic congestive heart failure  LVEF 15%, CRT-D  ADHF felt to be in setting of dietary sodium indiscretion and liberal fluid intake  Diurese with IV Lasix  Continue BB, ARB. Will consider transition to Entresto once more euvolemic   Troponin mildly elevated in setting of ADHF  05/2019 [facility]- normal coronary arteries          VTE Risk Mitigation (From admission, onward)    None          Thank you for your consult. I will follow-up with patient. Please contact us if you have any additional questions.    Prieto Cruz NP  Cardiology   [facility]

## 2020-02-11 NOTE — ASSESSMENT & PLAN NOTE
LVEF 15%, CRT-D  ADHF felt to be in setting of dietary sodium indiscretion and liberal fluid intake  Diurese with IV Lasix  Continue BB, ARB. Will consider transition to Entresto once more euvolemic   Troponin mildly elevated in setting of ADHF  05/2019 [facility]- normal coronary arteries

## 2020-02-11 NOTE — SUBJECTIVE & OBJECTIVE
Past Medical History:   Diagnosis Date    Asthma     COPD (chronic obstructive pulmonary disease)        Past Surgical History:   Procedure Laterality Date    CORONARY ANGIOGRAPHY N/A 5/28/2019    Procedure: ANGIOGRAM, CORONARY ARTERY;  Surgeon: Nikunj Pabon MD;  Location: [facility] CATH LAB/EP;  Service: Cardiology;  Laterality: N/A;    INSERTION OF BIVENTRICULAR IMPLANTABLE CARDIOVERTER-DEFIBRILLATOR (ICD) N/A 9/25/2019    Procedure: INSERTION, ICD, BIVENTRICULAR;  Surgeon: Yaniv Townsend MD;  Location: [facility] EP LAB;  Service: Cardiology;  Laterality: N/A;  HF, CRT-D, SJM, MAC, [place], 3 Prep    LEFT HEART CATHETERIZATION N/A 5/28/2019    Procedure: Left heart cath;  Surgeon: Nikunj Pabon MD;  Location: [facility] CATH LAB/EP;  Service: Cardiology;  Laterality: N/A;       Review of patient's allergies indicates:  No Known Allergies    No current facility-administered medications on file prior to encounter.      Current Outpatient Medications on File Prior to Encounter   Medication Sig    albuterol (ACCUNEB) 0.63 mg/3 mL Nebu Take 3 mLs (0.63 mg total) by nebulization every 6 (six) hours as needed.    albuterol (PROAIR HFA) 90 mcg/actuation inhaler Inhale 2 puffs into the lungs every 6 (six) hours as needed for Wheezing or Shortness of Breath (or cough).    aspirin (ECOTRIN) 81 MG EC tablet Take 1 tablet (81 mg total) by mouth once daily.    budesonide-formoterol 160-4.5 mcg (SYMBICORT) 160-4.5 mcg/actuation HFAA Inhale 2 puffs into the lungs every 12 (twelve) hours.    carvedilol (COREG) 6.25 MG tablet Take 1 tablet (6.25 mg total) by mouth 2 (two) times daily.    fluticasone propionate (FLONASE) 50 mcg/actuation nasal spray spray 2 sprays (100 mcg total) by Each Nare route once daily.    furosemide (LASIX) 20 MG tablet Take 1 tablet (20 mg total) by mouth once daily.    losartan (COZAAR) 25 MG tablet Take 0.5 tablets (12.5 mg total) by mouth once daily.    sildenafil (VIAGRA) 50 MG tablet Take 1 tablet (50 mg  total) by mouth daily as needed for Erectile Dysfunction.     Family History     None        Tobacco Use    Smoking status: Never Smoker    Smokeless tobacco: Never Used   Substance and Sexual Activity    Alcohol use: Yes     Alcohol/week: 9.0 standard drinks     Types: 9 Cans of beer per week     Comment: Stated he drinks on Friday, Saturday, & Sunday.     Drug use: No    Sexual activity: Not on file     Review of Systems   Constitution: Negative for malaise/fatigue.   HENT: Negative.    Eyes: Negative.    Cardiovascular: Positive for dyspnea on exertion and leg swelling. Negative for chest pain, irregular heartbeat, near-syncope, orthopnea, palpitations, paroxysmal nocturnal dyspnea and syncope.   Respiratory: Positive for shortness of breath.    Endocrine: Negative.    Hematologic/Lymphatic: Negative.    Skin: Negative.    Musculoskeletal: Negative.    Gastrointestinal: Positive for bloating.   Neurological: Negative.    Psychiatric/Behavioral: Negative.    Allergic/Immunologic: Negative.      Objective:     Vital Signs (Most Recent):  Temp: 97.6 °F (36.4 °C) (02/11/20 1125)  Pulse: 87 (02/11/20 1420)  Resp: (!) 29 (02/11/20 1420)  BP: (!) 149/97 (02/11/20 1301)  SpO2: 99 % (02/11/20 1420) Vital Signs (24h Range):  Temp:  [97.6 °F (36.4 °C)] 97.6 °F (36.4 °C)  Pulse:  [84-93] 87  Resp:  [20-29] 29  SpO2:  [97 %-100 %] 99 %  BP: (134-149)/(82-97) 149/97     Weight: 67.6 kg (149 lb)  Body mass index is 24.05 kg/m².    SpO2: 99 %  O2 Device (Oxygen Therapy): room air      Intake/Output Summary (Last 24 hours) at 2/11/2020 1423  Last data filed at 2/11/2020 1400  Gross per 24 hour   Intake --   Output 300 ml   Net -300 ml       Lines/Drains/Airways     Peripheral Intravenous Line                 Peripheral IV - Single Lumen 02/11/20 1218 20 G Right Forearm less than 1 day                Physical Exam   Constitutional: He is oriented to person, place, and time. No distress.   HENT:   Head: Atraumatic.   Eyes:  Right eye exhibits no discharge. Left eye exhibits no discharge.   Neck: JVD present.   Cardiovascular: Normal rate and regular rhythm. Exam reveals no gallop and no friction rub.   Murmur heard.  Pulmonary/Chest: Effort normal. He has decreased breath sounds.   Abdominal: Bowel sounds are normal. He exhibits distension.   Musculoskeletal: He exhibits edema.   Neurological: He is alert and oriented to person, place, and time.   Skin: Skin is warm and dry. He is not diaphoretic.   Psychiatric: He has a normal mood and affect. His behavior is normal. Judgment and thought content normal.       Significant Labs:   BMP:   Recent Labs   Lab 02/11/20  1215   GLU 78      K 4.1      CO2 28   BUN 15   CREATININE 1.4   CALCIUM 9.1   MG 2.0   , CMP   Recent Labs   Lab 02/11/20  1215      K 4.1      CO2 28   GLU 78   BUN 15   CREATININE 1.4   CALCIUM 9.1   PROT 6.3   ALBUMIN 3.2*   BILITOT 0.9   ALKPHOS 93   AST 21   ALT 21   ANIONGAP 6*   ESTGFRAFRICA >60   EGFRNONAA 53*   , CBC   Recent Labs   Lab 02/11/20  1215   WBC 5.39   HGB 14.1   HCT 43.7      , INR No results for input(s): INR, PROTIME in the last 48 hours., Lipid Panel No results for input(s): CHOL, HDL, LDLCALC, TRIG, CHOLHDL in the last 48 hours., Troponin   Recent Labs   Lab 02/11/20  1215   TROPONINI 0.064*    and All pertinent lab results from the last 24 hours have been reviewed.    Significant Imaging: Echocardiogram:   Transthoracic echo (TTE) complete (Cupid Only):   Results for orders placed or performed during the hospital encounter of 08/16/19   Transthoracic echo (TTE) 2D with Color Flow   Result Value Ref Range    LV LATERAL E/E' RATIO 11.63 m/s    LA WIDTH 5.00 cm    AORTIC VALVE CUSP SEPERATION 1.98 cm    TDI LATERAL 0.08 m/s    PV PEAK VELOCITY 0.74 cm/s    LVIDD 5.65 3.5 - 6.0 cm    IVS 1.02 0.6 - 1.1 cm    PW 1.08 0.6 - 1.1 cm    Ao root annulus 2.81 cm    LVIDS 5.32 (A) 2.1 - 4.0 cm    FS 6 28 - 44 %    IVC proximal  1.6 cm    LA volume 81.44 cm3    Sinus 2.49 cm    LV mass 237.82 g    LA size 3.30 cm    RVDD 2.36 cm    TAPSE 1.95 cm    Left Ventricle Relative Wall Thickness 0.38 cm    AV mean gradient 2 mmHg    AV valve area 2.78 cm2    AV Velocity Ratio 0.89     AV index (prosthetic) 0.92     MV valve area by continuity eq 1.93 cm2    Pulm vein S/D ratio 0.50     LVOT diameter 1.96 cm    LVOT area 3.0 cm2    LVOT peak robert 0.79 m/s    LVOT peak VTI 12.17 cm    Ao peak robert 0.89 m/s    Ao VTI 13.19 cm    LVOT stroke volume 36.70 cm3    AV peak gradient 3 mmHg    MV Peak E Robert 0.93 m/s    TR Max Robert 2.94 m/s    MV VTI 19 cm    PV Peak S Robert 0.32 m/s    PV Peak D Robert 0.64 m/s    LV Systolic Volume 136.60 mL    LV Diastolic Volume 156.68 mL    RA Major Axis 4.83 cm    Left Atrium Minor Axis 5.95 cm    Left Atrium Major Axis 5.67 cm    Triscuspid Valve Regurgitation Peak Gradient 35 mmHg    RA Width 5.41 cm    Right Atrial Pressure (from IVC) 3 mmHg    TV rest pulmonary artery pressure 38 mmHg    Narrative    · Eccentric left ventricular hypertrophy.  · Moderate left ventricular enlargement.  · Severely decreased left ventricular systolic function. The estimated   ejection fraction is 15%  · Grade II (moderate) left ventricular diastolic dysfunction consistent   with pseudonormalization.  · Mild tricuspid regurgitation.  · Mild mitral regurgitation.  · Mildly reduced right ventricular systolic function.  · Severe left atrial enlargement.  · Moderate right atrial enlargement.  · Elevated left atrial pressure.  · Normal central venous pressure (3 mm Hg).  · The estimated PA systolic pressure is 38 mm Hg

## 2020-02-11 NOTE — HOSPITAL COURSE
02/11/2020 Presented progressive LE edema for past 3 days. BNP 2671, edematous on exam. Given Lasix 80 mg IV in the ED and started on IV Lasix 60mg BID. Admitted to [facility] for further observation  2/12/2020 On IV Lasix 60mg BID with 2.6 liters out overnight negative 2.6 liters since admission likely slightly less. K+ 3.6 with K+ replacement ordered. Creatinine 1.5 essentially unchanged from 1.4 yesterday. Short run of NSVT noted on telemetry overnight. Continued on BB therapy and will add Entresto to regimen  2/13/2020 Remains on IV Lasix BID with 4.0 liters out overnight negative 3.0 liters since admission. BMP and CBC stable. HR and BP stable overnight. Reports SOB resolved and ambulating around room without any complaints-will ambulate in hallway today

## 2020-02-11 NOTE — H&P
[facility] Internal Medicine History and Physical - Resident Note    Admitting Team: [facility] Internal Medicine Team B  Attending Physician: Dr. Kincaid  Resident: Dr. Read  Interns: Veronique and Hernadez    Date of Admit: 2/11/2020    Chief Complaint     Lower extremity swelling x 4 days      Subjective:      History of Present Illness:  Dima Quintanilla is a 64 y.o. male who has a past medical history of asthma, COPD, HFrEF (EF of 15%) with AICD/pacemaker placement, and HTN. The patient presented to [facility] on 2/11/2020 with a primary complaint of bilateral lower extremity swelling.     He reports that the swelling started 4 days ago in both his feet and has gradually progressed over the past 3 days extending up into both his thighs. He denies any leg or foot pain and states that he decided to come to the ED because it was uncomfortable for him to walk. He states that he has been taking Lasix 20 mg daily without any improvement in his swelling. He states his girlfriend takes Lasix 40mg BID and he took a couple of her pills a few weeks ago with improvement in his swelling and increased UOP. He has been taking his home Lasix a 20mg BID without improvement. He also states that he started feeling mildly short of breath with exertion since yesterday, which he attributes to his asthma. He states that he last weighed himself a week ago (last Monday) and was 149 lbs at the time. The patient reports that he has been trying to follow his low salt diet, but he recently ate fried chicken and other salty foods.  He denies chest pain, palpitations, orthopnea, paroxsymal nocturnal dyspnea, fever, chills, headache or change in vision, nausea, vomiting, abdominal pain or distension, decreased urine output, or diaphoresis. He states that he had always used 2 pillows to sleep, and that he has not needed any additional pillows at night due to his recent symptoms.       Past Medical History:  Past Medical History:   Diagnosis  Date    Asthma     COPD (chronic obstructive pulmonary disease)        Past Surgical History:  Past Surgical History:   Procedure Laterality Date    CORONARY ANGIOGRAPHY N/A 5/28/2019    Procedure: ANGIOGRAM, CORONARY ARTERY;  Surgeon: Nikunj Pabon MD;  Location: [facility] CATH LAB/EP;  Service: Cardiology;  Laterality: N/A;    INSERTION OF BIVENTRICULAR IMPLANTABLE CARDIOVERTER-DEFIBRILLATOR (ICD) N/A 9/25/2019    Procedure: INSERTION, ICD, BIVENTRICULAR;  Surgeon: Yaniv Townsend MD;  Location: [facility] EP LAB;  Service: Cardiology;  Laterality: N/A;  HF, CRT-D, SJM, MAC, [place], 3 Prep    LEFT HEART CATHETERIZATION N/A 5/28/2019    Procedure: Left heart cath;  Surgeon: Nikunj Pabon MD;  Location: [facility] CATH LAB/EP;  Service: Cardiology;  Laterality: N/A;       Allergies:  Review of patient's allergies indicates:  No Known Allergies    Home Medications:  Prior to Admission medications    Medication Sig Start Date End Date Taking? Authorizing Provider   albuterol (ACCUNEB) 0.63 mg/3 mL Nebu Take 3 mLs (0.63 mg total) by nebulization every 6 (six) hours as needed. 2/24/15   Azeb Chang MD   albuterol (PROAIR HFA) 90 mcg/actuation inhaler Inhale 2 puffs into the lungs every 6 (six) hours as needed for Wheezing or Shortness of Breath (or cough). 2/24/15   Azeb Chang MD   aspirin (ECOTRIN) 81 MG EC tablet Take 1 tablet (81 mg total) by mouth once daily. 6/3/19 6/2/20  Monika Santiago MD   budesonide-formoterol 160-4.5 mcg (SYMBICORT) 160-4.5 mcg/actuation HFAA Inhale 2 puffs into the lungs every 12 (twelve) hours. 2/24/15 9/25/19  Azeb Chang MD   carvedilol (COREG) 6.25 MG tablet Take 1 tablet (6.25 mg total) by mouth 2 (two) times daily. 1/13/20 1/12/21  Daniella Isabel NP   fluticasone propionate (FLONASE) 50 mcg/actuation nasal spray spray 2 sprays (100 mcg total) by Each Nare route once daily. 5/29/19   Jlusi Fang MD   furosemide (LASIX) 20 MG tablet Take 1 tablet (20 mg total) by mouth  once daily. 9/16/19 9/15/20  FERNANDA Randle, PAZ   losartan (COZAAR) 25 MG tablet Take 0.5 tablets (12.5 mg total) by mouth once daily. 9/16/19 9/15/20  FERNANDA Randle, PAZ   sildenafil (VIAGRA) 50 MG tablet Take 1 tablet (50 mg total) by mouth daily as needed for Erectile Dysfunction. 19  FERNANDA Randle ANP       Family History:  Mother: passed away from MI at 72 y.o  Father: passed away from MI at 78 y.o       Social History:  Social History     Tobacco Use    Smoking status: Never Smoker    Smokeless tobacco: Never Used   Substance Use Topics    Alcohol use: Yes     Alcohol/week: 9.0 standard drinks     Types: 9 Cans of beer per week     Comment: Stated he drinks on Friday, Saturday, & .     Drug use: No       Review of Systems:  Pertinent positives and negatives listed in HPI. All other systems are reviewed and are negative.    Health Maintaince :   Primary Care Physician: Sweetie Riojas  Immunizations:   TDap is not up to date  Influenza is not up to date.  Pneumovax is not up to date.  Cancer Screening:  Colonoscopy: is not up to date - can't remember if he has ever had one.      Objective:   Last 24 Hour Vital Signs:  BP  Min: 134/84  Max: 149/97  Temp  Av.6 °F (36.4 °C)  Min: 97.6 °F (36.4 °C)  Max: 97.6 °F (36.4 °C)  Pulse  Av.2  Min: 84  Max: 93  Resp  Av  Min: 20  Max: 29  SpO2  Av.2 %  Min: 97 %  Max: 100 %  Weight  Av.6 kg (149 lb)  Min: 67.6 kg (149 lb)  Max: 67.6 kg (149 lb)  Body mass index is 24.05 kg/m².  No intake/output data recorded.    Physical Examination:  General: Alert and awake in no apparent distress, pleasant cooperative, talkative   Head:  Normocephalic and atraumatic  Eyes:  PERRL; EOMi with anicteric sclera and clear conjunctivae  Mouth:  Oropharynx clear and without exudate; moist mucous membranes  Cardio:  Regular rate and rhythm with normal S1 and S2; no murmurs or rubs  Resp:  No increased work of breathing,  bilateral wet crackles at lung bases, no wheezes or rhonchi  Abdom: Soft and full, non-tender, with normoactive bowel sounds  Extrem: Warm and well-perfused with no clubbing or cyanosis. Normal range of motion. 2+ bilateral lower extremity pitting edema extending up into bilateral thighs. No swelling in upper extremities, no sacral edema  Skin:  No rashes, lesions, or color changes  Pulses: 2+ and symmetric distally  Neuro:  AAOx3; cooperative and pleasant with no focal deficits    Laboratory:  Most Recent Data:  CBC:   Lab Results   Component Value Date    WBC 5.39 02/11/2020    HGB 14.1 02/11/2020    HCT 43.7 02/11/2020     02/11/2020    MCV 82 02/11/2020    RDW 17.6 (H) 02/11/2020     BMP:   Lab Results   Component Value Date     02/11/2020    K 4.1 02/11/2020     02/11/2020    CO2 28 02/11/2020    BUN 15 02/11/2020    CREATININE 1.4 02/11/2020    GLU 78 02/11/2020    CALCIUM 9.1 02/11/2020    MG 2.0 02/11/2020    PHOS 3.2 01/07/2015     LFTs:   Lab Results   Component Value Date    PROT 6.3 02/11/2020    ALBUMIN 3.2 (L) 02/11/2020    BILITOT 0.9 02/11/2020    AST 21 02/11/2020    ALKPHOS 93 02/11/2020    ALT 21 02/11/2020     Coags:   Lab Results   Component Value Date    INR 1.0 09/25/2019     FLP:   Lab Results   Component Value Date    CHOL 178 05/27/2019    HDL 53 05/27/2019    LDLCALC 103.4 05/27/2019    TRIG 108 05/27/2019    CHOLHDL 29.8 05/27/2019     DM:   Lab Results   Component Value Date    HGBA1C 5.9 (H) 05/27/2019    LDLCALC 103.4 05/27/2019    CREATININE 1.4 02/11/2020     Thyroid:   Lab Results   Component Value Date    TSH 2.383 06/03/2019     Anemia: No results found for: IRON, TIBC, FERRITIN, TEMJSRQP57, FOLATE  Cardiac:   Lab Results   Component Value Date    TROPONINI 0.064 (H) 02/11/2020    BNP 2,671 (H) 02/11/2020     Urinalysis:   Lab Results   Component Value Date    LABURIN No growth 01/07/2015    COLORU Yellow 02/11/2020    SPECGRAV 1.015 02/11/2020    NITRITE  Negative 02/11/2020    KETONESU Negative 02/11/2020    UROBILINOGEN Negative 02/11/2020    WBCUA 0 01/07/2015       Trended Cardiac Data:  Recent Labs   Lab 02/11/20  1215   TROPONINI 0.064*   BNP 2,671*       Microbiology Data:  N/a    Other Results:  EKG (my interpretation): Extreme axis deviation, present on both EKGs from 2/11, normal sinus rhythm, widened QRS, L ventricular hypertrophy, will repeat EKG    Radiology:  Imaging Results          X-Ray Chest PA And Lateral (Final result)  Result time 02/11/20 12:58:42    Final result by Yaniv Sethi MD (02/11/20 12:58:42)                 Impression:      No acute radiographic findings.  No significant detrimental change from prior.      Electronically signed by: Yaniv Sethi MD  Date:    02/11/2020  Time:    12:58             Narrative:    EXAMINATION:  XR CHEST PA AND LATERAL    CLINICAL HISTORY:  Shortness of breath    TECHNIQUE:  PA and lateral views of the chest were performed.    COMPARISON:  09/25/2019    FINDINGS:  Stable enlargement the cardiac silhouette.  There is a left-sided ICD in stable position.  No evidence to suggest significant cardiac decompensation.  Lungs show no evidence of significant focal consolidation, large effusion or pneumothorax.  Bones show no acute abnormalities.                                   Assessment:     Dima Quintanilla is a 64 y.o. male with:  Patient Active Problem List    Diagnosis Date Noted    Presence of cardiac resynchronization therapy defibrillator (CRT-D) 01/13/2020    NICM (nonischemic cardiomyopathy) 01/13/2020    LBBB (left bundle branch block) 08/23/2019    Essential hypertension 08/23/2019    Cardiac rhythm disorder or disturbance or change     Acute on chronic combined systolic and diastolic congestive heart failure 05/25/2019    Anemia of other chronic disease 12/28/2013    Moderate persistent asthma with exacerbation 12/28/2013        Plan:     Mr. Quintanilla is a 64 y.o male with a PMHx of asthma, COPD  and CHF s/p AICD placement and pacemaker, who presented to [facility] complaining of 4 days of bilateral lower extremity swelling. He has been admitted to the hospital for IV diuresis for suspected acute on chronic heart failure exacerbation.       Code Status:     Volume overload 2/2 acute on chronic combined systolic and diastolic congestive heart failure  Patient presented to the ED complaining of 4 days of LE edema as well as worsening dyspnea on exertion, attributed to asthma exacerbation. On arrival, he was afebrile with stable vitals, (SpO2 97% on room air).   -2+ pitting edema in bilateral LE  -BNP: 2671, CXR with mild cardiomegaly but no pulmonary edema appreciated   -Troponin I: 0.064 ng/mL, will trend   -ECG: will trend   -received lasix 80 mg IV,  mg in ED - will continue with 60mg Lasix BID IV and ASA 81 mg daily and continue to monitor UOP  -admit to floor with cardiac monitoring  -consulted cardiology: rec'ed starting lasix 60 mg IV BID - no plans for cardiac cath or TTE at this point, bedside TTE with significantly reduced EF     Asthma:  Patient has documented history of asthma with prior history of hospitalizations for exacerbation requiring intubation (most recently 2015)  -Home meds include: symbicort with rescue albuterol and nebulizer. Patient uses symbicort and nebulizer daily. Uses nebulizer once a day every morning and symbicort BID with rescue albuterol as needed.   - PRN Duo-nebs for wheezing   - Pt is out of home symbicort and pharmacy needs to order it so it is not available until the morning, pt aware   - Continue to monitor for worsening respiratory status     HTN:  - On Coreg 3.125 BID, losartan 12.5 mg daily at home   - Will continue both while inpatient     Code: Full  Diet: Cardiac   Ppx: Lovenox   Dispo: pending diuresis for heart failure exacerbation     Lorraine Piper MD  [facility]  [facility] HO-1  02/11/2020 8:53 PM

## 2020-02-11 NOTE — ED NOTES
PT reports bilateral lower extremity edema x 4 days, takes lasix daily.  Also reports increased SOB x 2 days.  Denies CP.  Hx of asthma.  Last used albuterol inhaler today around 9 am.  Patient placed on continuous cardiac monitor, automatic blood pressure cuff and continuous pulse oximeter.

## 2020-02-11 NOTE — HPI
Dima Quintanilla is a 64-year-old male with past medical history of COPD, HFrEF (EF of 15%) CRT-D, HTN, asthma, NICM who presented to ED with complaints of bilateral lower extremity swelling with progressive worsening over the last 3 days.  He reports that he has been taking Lasix 10 mg twice a day for the last 3 days without any improvement in swelling.  He reports that today he started feeling mildly short of breath with exertion.  Patient reports that he recently ate fried chicken and has been drinking plenty of fluids. Denies orthopnea, PND, chest pain, nausea, vomiting, abdominal distention, decreased urine output.

## 2020-02-11 NOTE — ED PROVIDER NOTES
Encounter Date: 2/11/2020       History     Chief Complaint   Patient presents with    Foot Swelling     reports bilateral feet swelling for 3days. reports SOb started todayhx of asthma.     Fall patient is a 64-year-old male with past medical history of COPD, HFrEF (EF of 15%) with ICD placement, and HTN who presents to ED with complaints of bilateral lower extremity swelling.  Patient reports and they have been progressively worsening over the last 3 days.  He reports that he has been taking Lasix 10 mg twice a day for the last 3 days without any improvement in swelling.  He reports that today he started feeling mildly short of breath with exertion.  Patient reports that he recently ate fried chicken and has been drinking plenty of fluids. Denies orthopnea, PND, chest pain, nausea, vomiting, abdominal distention, decreased urine output, or any other complaints this time.    The history is provided by the patient.     Review of patient's allergies indicates:  No Known Allergies  Past Medical History:   Diagnosis Date    Asthma     COPD (chronic obstructive pulmonary disease)      Past Surgical History:   Procedure Laterality Date    CORONARY ANGIOGRAPHY N/A 5/28/2019    Procedure: ANGIOGRAM, CORONARY ARTERY;  Surgeon: Nikunj Pabon MD;  Location: [facility] CATH LAB/EP;  Service: Cardiology;  Laterality: N/A;    INSERTION OF BIVENTRICULAR IMPLANTABLE CARDIOVERTER-DEFIBRILLATOR (ICD) N/A 9/25/2019    Procedure: INSERTION, ICD, BIVENTRICULAR;  Surgeon: Yaniv Townsend MD;  Location: [facility] EP LAB;  Service: Cardiology;  Laterality: N/A;  HF, CRT-D, SJM, MAC, OH, 3 Prep    LEFT HEART CATHETERIZATION N/A 5/28/2019    Procedure: Left heart cath;  Surgeon: Nikunj Pabon MD;  Location: [facility] CATH LAB/EP;  Service: Cardiology;  Laterality: N/A;     History reviewed. No pertinent family history.  Social History     Tobacco Use    Smoking status: Never Smoker    Smokeless tobacco: Never Used   Substance Use Topics     Alcohol use: Yes     Alcohol/week: 9.0 standard drinks     Types: 9 Cans of beer per week     Comment: Stated he drinks on Friday, Saturday, & Sunday.     Drug use: No     Review of Systems   Constitutional: Negative for chills, diaphoresis, fatigue and fever.   Respiratory: Positive for shortness of breath. Negative for cough, chest tightness, wheezing and stridor.    Cardiovascular: Positive for leg swelling. Negative for chest pain and palpitations.   Gastrointestinal: Negative for abdominal distention, abdominal pain, nausea and vomiting.   Genitourinary: Negative for decreased urine volume, difficulty urinating and dysuria.   Musculoskeletal: Negative for back pain and myalgias.   Skin: Negative for rash.   Neurological: Negative for dizziness, weakness, numbness and headaches.       Physical Exam     Initial Vitals [02/11/20 1125]   BP Pulse Resp Temp SpO2   134/84 85 20 97.6 °F (36.4 °C) 97 %      MAP       --         Physical Exam    Nursing note and vitals reviewed.  Constitutional: He appears well-developed and well-nourished. He is not diaphoretic. No distress.   HENT:   Head: Normocephalic and atraumatic.   Eyes: Conjunctivae and EOM are normal. Pupils are equal, round, and reactive to light.   Neck: Normal range of motion. Neck supple.   Cardiovascular: Normal rate, regular rhythm, normal heart sounds and intact distal pulses.   Pulmonary/Chest: Breath sounds normal. No respiratory distress. He has no wheezes. He has no rhonchi. He has no rales. He exhibits no tenderness.   Abdominal: Soft. Bowel sounds are normal. He exhibits no distension. There is no tenderness.   Musculoskeletal: Normal range of motion. He exhibits no edema or tenderness.   In 2+ bilateral lower extremity edema extending up into bilateral thighs.   Neurological: He is alert and oriented to person, place, and time. He has normal strength.   Skin: Skin is warm. Capillary refill takes less than 2 seconds. No rash noted.         ED  Course   Procedures  Labs Reviewed   CBC W/ AUTO DIFFERENTIAL - Abnormal; Notable for the following components:       Result Value    Mean Corpuscular Hemoglobin 26.6 (*)     RDW 17.6 (*)     MPV 13.2 (*)     All other components within normal limits   COMPREHENSIVE METABOLIC PANEL - Abnormal; Notable for the following components:    Albumin 3.2 (*)     Anion Gap 6 (*)     eGFR if non  53 (*)     All other components within normal limits   B-TYPE NATRIURETIC PEPTIDE - Abnormal; Notable for the following components:    BNP 2,671 (*)     All other components within normal limits   TROPONIN I - Abnormal; Notable for the following components:    Troponin I 0.064 (*)     All other components within normal limits   MAGNESIUM   URINALYSIS, REFLEX TO URINE CULTURE    Narrative:     Preferred Collection Type->Urine, Clean Catch     EKG Readings: (Independently Interpreted)   EKG showed normal sinus rhythm.  Inverted T-waves in leads V5 and V6, unchanged from previous EKG on 01/13/2020.  No ST changes.  No STEMI.       Imaging Results          X-Ray Chest PA And Lateral (Final result)  Result time 02/11/20 12:58:42    Final result by Yaniv Sethi MD (02/11/20 12:58:42)                 Impression:      No acute radiographic findings.  No significant detrimental change from prior.      Electronically signed by: Yaniv Sethi MD  Date:    02/11/2020  Time:    12:58             Narrative:    EXAMINATION:  XR CHEST PA AND LATERAL    CLINICAL HISTORY:  Shortness of breath    TECHNIQUE:  PA and lateral views of the chest were performed.    COMPARISON:  09/25/2019    FINDINGS:  Stable enlargement the cardiac silhouette.  There is a left-sided ICD in stable position.  No evidence to suggest significant cardiac decompensation.  Lungs show no evidence of significant focal consolidation, large effusion or pneumothorax.  Bones show no acute abnormalities.                                 Medical Decision Making:   ED  Management:  Patient is a 64-year-old male with history CHF who presents to ED with bilateral lower extremity edema. BNP and troponin elevated from previous.  EKG is without any acute ischemic changes.  Patient given aspirin 325 mg and Lasix 80 mg IV.  He will be placed in CDU for troponin trends and further diuresis.  Discussed case with Dr. Kincaid and discussed with patients' cardiologist Dr. Solorzano.               Attending Attestation:     Physician Attestation Statement for NP/PA:   I discussed this assessment and plan of this patient with the NP/PA, but I did not personally examine the patient. The face to face encounter was performed by the NP/PA.                  ED Course as of Feb 11 2209   Tue Feb 11, 2020   1351 BNP(!): 2,671 [EM]   1351 BNP(!): 2,671 [EM]   1429 Spoke with Dr. Kincaid, patient will be placed in CDU.     [EM]      ED Course User Index  [EM] Lizz Laurent PA-C                Clinical Impression:       ICD-10-CM ICD-9-CM   1. Acute on chronic combined systolic and diastolic congestive heart failure I50.43 428.43     428.0   2. Shortness of breath R06.02 786.05   3. Moderate persistent asthma with exacerbation J45.41 493.92   4. HFrEF (heart failure with reduced ejection fraction) I50.20 428.20   5. Acute on chronic congestive heart failure, unspecified heart failure type I50.9 428.0         Disposition:   Disposition: Placed in Observation  Condition: Stable                     Lizz Laurent PA-C  02/11/20 2210       Lizz Laurent PA-C  02/12/20 0020       Aj Beltran MD  02/12/20 [id number]

## 2020-02-12 LAB
ALBUMIN SERPL BCP-MCNC: 2.9 G/DL (ref 3.5–5.2)
ALP SERPL-CCNC: 82 U/L (ref 55–135)
ALT SERPL W/O P-5'-P-CCNC: 16 U/L (ref 10–44)
ANION GAP SERPL CALC-SCNC: 10 MMOL/L (ref 8–16)
AST SERPL-CCNC: 19 U/L (ref 10–40)
BASOPHILS # BLD AUTO: 0.04 K/UL (ref 0–0.2)
BASOPHILS NFR BLD: 0.8 % (ref 0–1.9)
BILIRUB SERPL-MCNC: 0.9 MG/DL (ref 0.1–1)
BUN SERPL-MCNC: 18 MG/DL (ref 8–23)
CALCIUM SERPL-MCNC: 8.5 MG/DL (ref 8.7–10.5)
CHLORIDE SERPL-SCNC: 100 MMOL/L (ref 95–110)
CHOLEST SERPL-MCNC: 160 MG/DL (ref 120–199)
CHOLEST/HDLC SERPL: 2.9 {RATIO} (ref 2–5)
CO2 SERPL-SCNC: 30 MMOL/L (ref 23–29)
CREAT SERPL-MCNC: 1.5 MG/DL (ref 0.5–1.4)
DIFFERENTIAL METHOD: ABNORMAL
EOSINOPHIL # BLD AUTO: 0.3 K/UL (ref 0–0.5)
EOSINOPHIL NFR BLD: 5.9 % (ref 0–8)
ERYTHROCYTE [DISTWIDTH] IN BLOOD BY AUTOMATED COUNT: 17.5 % (ref 11.5–14.5)
EST. GFR  (AFRICAN AMERICAN): 56 ML/MIN/1.73 M^2
EST. GFR  (NON AFRICAN AMERICAN): 49 ML/MIN/1.73 M^2
ESTIMATED AVG GLUCOSE: 134 MG/DL (ref 68–131)
FERRITIN SERPL-MCNC: 94 NG/ML (ref 20–300)
GLUCOSE SERPL-MCNC: 93 MG/DL (ref 70–110)
HBA1C MFR BLD HPLC: 6.3 % (ref 4–5.6)
HCT VFR BLD AUTO: 44.1 % (ref 40–54)
HDLC SERPL-MCNC: 55 MG/DL (ref 40–75)
HDLC SERPL: 34.4 % (ref 20–50)
HGB BLD-MCNC: 14 G/DL (ref 14–18)
IMM GRANULOCYTES # BLD AUTO: 0.01 K/UL (ref 0–0.04)
IMM GRANULOCYTES NFR BLD AUTO: 0.2 % (ref 0–0.5)
LDLC SERPL CALC-MCNC: 94 MG/DL (ref 63–159)
LYMPHOCYTES # BLD AUTO: 1.3 K/UL (ref 1–4.8)
LYMPHOCYTES NFR BLD: 24.4 % (ref 18–48)
MAGNESIUM SERPL-MCNC: 1.8 MG/DL (ref 1.6–2.6)
MCH RBC QN AUTO: 25.9 PG (ref 27–31)
MCHC RBC AUTO-ENTMCNC: 31.7 G/DL (ref 32–36)
MCV RBC AUTO: 82 FL (ref 82–98)
MONOCYTES # BLD AUTO: 0.5 K/UL (ref 0.3–1)
MONOCYTES NFR BLD: 8.9 % (ref 4–15)
NEUTROPHILS # BLD AUTO: 3.2 K/UL (ref 1.8–7.7)
NEUTROPHILS NFR BLD: 59.8 % (ref 38–73)
NONHDLC SERPL-MCNC: 105 MG/DL
NRBC BLD-RTO: 0 /100 WBC
PHOSPHATE SERPL-MCNC: 3.9 MG/DL (ref 2.7–4.5)
PLATELET # BLD AUTO: 184 K/UL (ref 150–350)
PMV BLD AUTO: 13 FL (ref 9.2–12.9)
POTASSIUM SERPL-SCNC: 3.6 MMOL/L (ref 3.5–5.1)
PROT SERPL-MCNC: 5.7 G/DL (ref 6–8.4)
RBC # BLD AUTO: 5.4 M/UL (ref 4.6–6.2)
SODIUM SERPL-SCNC: 140 MMOL/L (ref 136–145)
TRIGL SERPL-MCNC: 55 MG/DL (ref 30–150)
TROPONIN I SERPL DL<=0.01 NG/ML-MCNC: 0.06 NG/ML (ref 0–0.03)
TSH SERPL DL<=0.005 MIU/L-ACNC: 1.37 UIU/ML (ref 0.4–4)
WBC # BLD AUTO: 5.28 K/UL (ref 3.9–12.7)

## 2020-02-12 PROCEDURE — 25000242 PHARM REV CODE 250 ALT 637 W/ HCPCS: Performed by: STUDENT IN AN ORGANIZED HEALTH CARE EDUCATION/TRAINING PROGRAM

## 2020-02-12 PROCEDURE — 12000002 HC ACUTE/MED SURGE SEMI-PRIVATE ROOM

## 2020-02-12 PROCEDURE — 25000003 PHARM REV CODE 250: Performed by: STUDENT IN AN ORGANIZED HEALTH CARE EDUCATION/TRAINING PROGRAM

## 2020-02-12 PROCEDURE — 99233 SBSQ HOSP IP/OBS HIGH 50: CPT | Mod: ,,, | Performed by: INTERNAL MEDICINE

## 2020-02-12 PROCEDURE — 84443 ASSAY THYROID STIM HORMONE: CPT

## 2020-02-12 PROCEDURE — 84100 ASSAY OF PHOSPHORUS: CPT

## 2020-02-12 PROCEDURE — 96376 TX/PRO/DX INJ SAME DRUG ADON: CPT

## 2020-02-12 PROCEDURE — 36415 COLL VENOUS BLD VENIPUNCTURE: CPT

## 2020-02-12 PROCEDURE — 83735 ASSAY OF MAGNESIUM: CPT

## 2020-02-12 PROCEDURE — 80061 LIPID PANEL: CPT

## 2020-02-12 PROCEDURE — 94761 N-INVAS EAR/PLS OXIMETRY MLT: CPT

## 2020-02-12 PROCEDURE — 82728 ASSAY OF FERRITIN: CPT

## 2020-02-12 PROCEDURE — 80053 COMPREHEN METABOLIC PANEL: CPT

## 2020-02-12 PROCEDURE — 83036 HEMOGLOBIN GLYCOSYLATED A1C: CPT

## 2020-02-12 PROCEDURE — 63600175 PHARM REV CODE 636 W HCPCS: Performed by: STUDENT IN AN ORGANIZED HEALTH CARE EDUCATION/TRAINING PROGRAM

## 2020-02-12 PROCEDURE — 83540 ASSAY OF IRON: CPT

## 2020-02-12 PROCEDURE — 99900038 HC OT GENERIC THERAPY SCREENING (STAT)

## 2020-02-12 PROCEDURE — 99233 PR SUBSEQUENT HOSPITAL CARE,LEVL III: ICD-10-PCS | Mod: ,,, | Performed by: INTERNAL MEDICINE

## 2020-02-12 PROCEDURE — 85025 COMPLETE CBC W/AUTO DIFF WBC: CPT

## 2020-02-12 PROCEDURE — 25000003 PHARM REV CODE 250: Performed by: NURSE PRACTITIONER

## 2020-02-12 PROCEDURE — 94640 AIRWAY INHALATION TREATMENT: CPT

## 2020-02-12 RX ORDER — BUDESONIDE AND FORMOTEROL FUMARATE DIHYDRATE 80; 4.5 UG/1; UG/1
2 AEROSOL RESPIRATORY (INHALATION) 2 TIMES DAILY
Status: DISCONTINUED | OUTPATIENT
Start: 2020-02-12 | End: 2020-02-14 | Stop reason: HOSPADM

## 2020-02-12 RX ORDER — POTASSIUM CHLORIDE 20 MEQ/1
40 TABLET, EXTENDED RELEASE ORAL ONCE
Status: COMPLETED | OUTPATIENT
Start: 2020-02-12 | End: 2020-02-12

## 2020-02-12 RX ADMIN — ATORVASTATIN CALCIUM 40 MG: 20 TABLET, FILM COATED ORAL at 08:02

## 2020-02-12 RX ADMIN — ENOXAPARIN SODIUM 40 MG: 100 INJECTION SUBCUTANEOUS at 05:02

## 2020-02-12 RX ADMIN — CARVEDILOL 3.12 MG: 3.12 TABLET, FILM COATED ORAL at 08:02

## 2020-02-12 RX ADMIN — CARVEDILOL 3.12 MG: 3.12 TABLET, FILM COATED ORAL at 09:02

## 2020-02-12 RX ADMIN — FUROSEMIDE 60 MG: 10 INJECTION, SOLUTION INTRAMUSCULAR; INTRAVENOUS at 09:02

## 2020-02-12 RX ADMIN — FLUTICASONE PROPIONATE 100 MCG: 50 SPRAY, METERED NASAL at 09:02

## 2020-02-12 RX ADMIN — SACUBITRIL AND VALSARTAN 1 TABLET: 49; 51 TABLET, FILM COATED ORAL at 09:02

## 2020-02-12 RX ADMIN — SACUBITRIL AND VALSARTAN 1 TABLET: 49; 51 TABLET, FILM COATED ORAL at 08:02

## 2020-02-12 RX ADMIN — ASPIRIN 81 MG: 81 TABLET, COATED ORAL at 09:02

## 2020-02-12 RX ADMIN — POTASSIUM CHLORIDE 40 MEQ: 1500 TABLET, EXTENDED RELEASE ORAL at 11:02

## 2020-02-12 RX ADMIN — BUDESONIDE AND FORMOTEROL FUMARATE DIHYDRATE 2 PUFF: 80; 4.5 AEROSOL RESPIRATORY (INHALATION) at 08:02

## 2020-02-12 NOTE — PLAN OF CARE
Pt reports he lives with his girlfriend Marie who can provide help and transportation if needed. Pt stated he has been independent with all adls including driving. Pt has a history of CHF and admits to be noncompliant with diet. Tn informed pt of PCC and pt agreeable . Tn sent message to ANNIE Suero in PCC and requested appt and CHF education.  TN also reviewed CHF s/s to monitor for and pt's responsibilities for managing care.  Tn gave pt d/c folder, brochure, and card and encouraged to call for any needs/concerns.    Future Appointments   Date Time Provider Department Center   2/20/2020  1:00 PM Monika Santiago MD [facility] IMPRI [facility]   2/28/2020 10:20 AM Chelly Estrada MD [facility] CARDIO [facility]   3/28/2020 10:00 AM HOME MONITOR DEVICE CHECK, [facility] [address]        02/12/20 1000   Discharge Assessment   Assessment Type Discharge Planning Assessment   Confirmed/corrected address and phone number on facesheet? Yes   Assessment information obtained from? Patient   Expected Length of Stay (days) 2   Communicated expected length of stay with patient/caregiver yes   Prior to hospitilization cognitive status: Alert/Oriented   Prior to hospitalization functional status: Independent   Current cognitive status: Alert/Oriented   Current Functional Status: Independent   Lives With significant other   Able to Return to Prior Arrangements yes   Is patient able to care for self after discharge? Yes   Who are your caregiver(s) and their phone number(s)? Marie (S.O.) [phone]   Patient's perception of discharge disposition home or selfcare   Readmission Within the Last 30 Days no previous admission in last 30 days   Patient currently being followed by outpatient case management? No   Patient currently receives any other outside agency services? No   Equipment Currently Used at Home none   Do you have any problems affording any of your prescribed medications? No   Is the patient taking medications as  prescribed? yes   Does the patient have transportation home? Yes   Transportation Anticipated family or friend will provide   Does the patient receive services at the [facility]? No   Discharge Plan A Home with family   Discharge Plan B Home with family   DME Needed Upon Discharge  none   Patient/Family in Agreement with Plan yes

## 2020-02-12 NOTE — ASSESSMENT & PLAN NOTE
-recent echo LVEF 15%, CRT-D  -ADHF related to to dietary sodium indiscretion and liberal fluid intake  -on IV Lasix 60mg BID with 2.6 liters out overnight; will continue with aggressive diuresis   -continue BB; will place on Entresto   -troponin mildly elevated in setting of ADHF  -[facility] in 05/2019 normal coronary arteries

## 2020-02-12 NOTE — PT/OT/SLP PROGRESS
Physical Therapy Screen and DC      Patient Name:  Dima Quintanilla   MRN:  [id number]    Pt met in bathroom voiding independently in bathroom; pt danced in room while trying to show PT he was independent and did not have any therapy needs.   Pt lives c/ SO in [place] [facility], no DME.   PLOF: Independent c/ no DME c/ functional mobility.   Pt does not have any PT needs at this time. PT DC.    Jose Rafael Weathers PT, DPT  2/12/2020

## 2020-02-12 NOTE — PROGRESS NOTES
Pharmacy New Medication Education    Patient and/or Caregiver ACCEPTED medication education.    Pharmacy has provided education on the name, indication, and possible side effects of the medication(s) prescribed, using teach-back method.     Learners of pharmacy medication education includes:  patient    The following medications have also been discussed, during this admission.     Current Facility-Administered Medications   Medication Frequency    albuterol-ipratropium 2.5 mg-0.5 mg/3 mL nebulizer solution 3 mL Q4H PRN    aspirin EC tablet 81 mg Daily    atorvastatin tablet 40 mg QHS    budesonide-formoterol 160-4.5 mcg inhaler 2 puff Q12H    carvediloL tablet 3.125 mg BID    enoxaparin injection 40 mg Daily    fluticasone propionate 50 mcg/actuation nasal spray 100 mcg Daily    furosemide injection 60 mg BID    sacubitril-valsartan 49-51 mg per tablet 1 tablet BID    sodium chloride 0.9% flush 10 mL PRN          Thank you  Lexx Andrew, PharmD

## 2020-02-12 NOTE — PROGRESS NOTES
"\[facility]\"" Internal Med Resident HO-I Progress Note    Subjective:      Dima Quintanilla is a 64 y.o. male who is being followed by the \[facility]\"" Internal Med Team B service at [facility] for acute on chronic exacerbation of systolic and diastolic heart failure.     Overnight he was able to get some sleep and is feeling much better. He thinks he is less swollen than when he came in. Denies any chest pain, SOB, or HA.      Objective:   Last 24 Hour Vital Signs:  BP  Min: 125/79  Max: 149/84  Temp  Av.2 °F (36.8 °C)  Min: 97.6 °F (36.4 °C)  Max: 98.9 °F (37.2 °C)  Pulse  Av.4  Min: 67  Max: 93  Resp  Av.8  Min: 16  Max: 29  SpO2  Av.2 %  Min: 95 %  Max: 100 %  Height  Av' 6" (167.6 cm)  Min: 5' 6" (167.6 cm)  Max: 5' 6" (167.6 cm)  Weight  Av.2 kg (148 lb 3.8 oz)  Min: 66.2 kg (145 lb 15.1 oz)  Max: 67.6 kg (149 lb)  I/O last 3 completed shifts:  In: -   Out: 300 [Urine:300]    Physical Examination:  General: alert and oriented, NAD, sitting up in bed eating breakfast   HEENT:  EOMI, no scleral icterus, moist mucous membranes  CV: RRR, no murmurs or rubs  Resp: bibasilar wet crackles still present in bilateral lungs, no increased WOB, no wheezing   Abd: soft and full, non-tender  Ext: 2+ pitting edema above the knee bilaterally, improved slightly from yesterday   Skin: warm and dry   Psych: appropriate affect     Laboratory:  Laboratory Data Reviewed: yes  Pertinent Findings:  Recent Labs   Lab 20  1215 20  0512   WBC 5.39 5.28   HGB 14.1 14.0   HCT 43.7 44.1    184   MCV 82 82   RDW 17.6* 17.5*    140   K 4.1 3.6    100   CO2 28 30*   BUN 15 18   CREATININE 1.4 1.5*   GLU 78 93   PROT 6.3 5.7*   ALBUMIN 3.2* 2.9*   BILITOT 0.9 0.9   AST 21 19   ALKPHOS 93 82   ALT 21 16        Microbiology Data Reviewed: yes  Pertinent Findings:  N/a    Other Results:  EKG (my interpretation): None new    Radiology Data Reviewed: yes  Pertinent Findings:  None new "     Current Medications:     Infusions:       Scheduled:   aspirin  81 mg Oral Daily    atorvastatin  40 mg Oral QHS    budesonide-formoterol 160-4.5 mcg  2 puff Inhalation Q12H    carvediloL  3.125 mg Oral BID    enoxaparin  40 mg Subcutaneous Daily    fluticasone propionate  2 spray Each Nostril Daily    furosemide (LASIX) IV  60 mg Intravenous BID        PRN:  albuterol-ipratropium, sodium chloride 0.9%    Antibiotics and Day Number of Therapy:  None    Lines and Day Number of Therapy:  PIV     Assessment:     Dima Quintanilla is a 64 y.o.male with h/o asthma, COPD and CHF s/p AICD placement and pacemaker, who presented to [facility] complaining of 4 days of bilateral lower extremity swelling consistent with volume overload in the setting of severe combined heart failure.     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 On arrival, he was afebrile with stable vitals, (SpO2 97% on room air).   -2+ pitting edema in bilateral LE  -BNP: 2671, CXR with mild cardiomegaly but no pulmonary edema appreciated   -Troponins stable, no need to continue to trend   -received lasix 80 mg IV,  mg in ED - will continue with 60mg Lasix BID IV and ASA 81 mg daily and continue to  monitor UOP  -admit to floor with cardiac monitoring  -consulted cardiology: rec'ed starting lasix 60 mg IV BID - no plans for cardiac cath or TTE at this point, bedside TTE with significantly reduced EF      Asthma:  Patient has documented history of asthma with prior history of hospitalizations for exacerbation requiring intubation (most recently 2015)  -Home meds include: symbicort with rescue albuterol and nebulizer. Patient uses symbicort and nebulizer daily. Uses nebulizer once a day every morning and symbicort BID with rescue albuterol as needed.   - PRN Duo-nebs for wheezing   - Pt is out of home symbicort and pharmacy needs to order - should be available this morning  - Continue to monitor for worsening respiratory status      HTN:  - On Coreg 3.125 BID, losartan 12.5 mg daily at home   - Will continue both while inpatient      Code: Full  Diet: Cardiac   Ppx: Lovenox   Dispo: pending diuresis for heart failure exacerbation     Lorraine Piper MD  [facility] Hospitalist Team B  [facility] Med Peds HO-1  02/12/2020 6:17 AM      [facility] Medicine Hospitalist Pager numbers:   [facility] Hospitalist Medicine Team A (Yvonne/Bonny): [phone]  [facility] Hospitalist Medicine Team B (Sanjiv/Holly):  [phone]

## 2020-02-12 NOTE — NURSING
[facility] RN clinician visited patient at hospital bedside. Educated patient on purpose of [facility]. Patient has been followed by [facility] in the past.  Discussed with patient that he would be followed by [facility] physician for approximately 30 days post hospital discharge, rather than [facility] physician.  Also, discussed that he should call clinic for any medical needs or urgent visits while under the care of [facility] physician.  Patient given and agreed on scheduled appointment time and date.  Patient given [facility] introduction sheet containing clinic phone number.  Patient is admitted with a diagnosis of heart failure and states he has been having it since last year and has received some education in the past. Signs and symptoms of heart failure to report to provider discussed in detail.  Discussed importance of taking daily weights and what to do if there is a 2-3 pound weight gain in a day or 5 pound or more weight gain in one week.  Patient states he has a scale at home, but was not weighing himself daily prior to hospital admit  Patient educated on low sodium diet and fluid limitation.  Reviewed with patient some high sodium foods and drinks he should avoid.   Patient admits to eating fried chicken and peeling the skin off prior to hospital admit.  Educated him on concerns on eating food not self prepared and fast food as it has the potential for high sodium content.  Instructions given on performing activities as tolerated, otherwise, instructed by physician.  Urged patient on importance of medication compliance related to heart failure diagnosis.  Patient reports compliance with medications.  8 Step Plan for Heart Failure Patients booklet given to patient.  Patient given opportunity to ask questions, stated understanding of education provided.  Teach back method used.

## 2020-02-12 NOTE — NURSING
Nurse notified by monitor tech of 6 beat run of VTACH. Patient resting comfortably in bed with no complaints. /89 HR 71. Paged admit team. No new orders at this time. Will continue to monitor.

## 2020-02-12 NOTE — SUBJECTIVE & OBJECTIVE
Review of Systems   Constitution: Negative for chills, decreased appetite, diaphoresis, fever, malaise/fatigue, weight gain and weight loss.   Cardiovascular: Positive for leg swelling. Negative for chest pain, claudication, dyspnea on exertion, irregular heartbeat, near-syncope, orthopnea, palpitations and paroxysmal nocturnal dyspnea.   Respiratory: Negative for cough, shortness of breath, snoring, sputum production and wheezing.    Endocrine: Negative for cold intolerance, heat intolerance, polydipsia, polyphagia and polyuria.   Skin: Negative for color change, dry skin, itching, nail changes and poor wound healing.   Musculoskeletal: Negative for back pain, gout, joint pain and joint swelling.   Gastrointestinal: Negative for bloating, abdominal pain, constipation, diarrhea, hematemesis, hematochezia, melena, nausea and vomiting.   Genitourinary: Negative for dysuria, hematuria and nocturia.   Neurological: Negative for dizziness, headaches, light-headedness, numbness, paresthesias and weakness.   Psychiatric/Behavioral: Negative for altered mental status, depression and memory loss.     Objective:     Vital Signs (Most Recent):  Temp: 98 °F (36.7 °C) (02/12/20 0727)  Pulse: 66 (02/12/20 0807)  Resp: 18 (02/12/20 0727)  BP: 131/79 (02/12/20 0727)  SpO2: 96 % (02/12/20 0900) Vital Signs (24h Range):  Temp:  [97.6 °F (36.4 °C)-98.9 °F (37.2 °C)] 98 °F (36.7 °C)  Pulse:  [66-93] 66  Resp:  [16-29] 18  SpO2:  [95 %-100 %] 96 %  BP: (125-149)/(79-97) 131/79     Weight: 66.2 kg (145 lb 15.1 oz)  Body mass index is 23.56 kg/m².     SpO2: 96 %  O2 Device (Oxygen Therapy): room air      Intake/Output Summary (Last 24 hours) at 2/12/2020 0953  Last data filed at 2/12/2020 0900  Gross per 24 hour   Intake 480 ml   Output 2640 ml   Net -2160 ml       Lines/Drains/Airways     Peripheral Intravenous Line                 Peripheral IV - Single Lumen 02/11/20 1218 20 G Right Forearm less than 1 day                Physical  Exam   Constitutional: He is oriented to person, place, and time. He appears well-developed and well-nourished. No distress.   Neck: Normal range of motion. Neck supple. No JVD present.   Cardiovascular: Normal rate and regular rhythm. Exam reveals no gallop.   No murmur heard.  Pulmonary/Chest: Effort normal and breath sounds normal. No respiratory distress. He has no wheezes.   Abdominal: Soft. Bowel sounds are normal. He exhibits no distension. There is no tenderness.   Musculoskeletal: He exhibits edema.   Neurological: He is alert and oriented to person, place, and time.   Skin: Skin is warm and dry.   Psychiatric: He has a normal mood and affect. His behavior is normal. Judgment and thought content normal.       Significant Labs:     Recent Labs   Lab 02/12/20  0512   WBC 5.28   RBC 5.40   HGB 14.0   HCT 44.1      MCV 82   MCH 25.9*   MCHC 31.7*     Recent Labs   Lab 02/12/20  0512      K 3.6      CO2 30*   BUN 18   CREATININE 1.5*   MG 1.8       Significant Imaging: Echocardiogram:   Transthoracic echo (TTE) complete (Cupid Only):   Results for orders placed or performed during the hospital encounter of 08/16/19   Transthoracic echo (TTE) 2D with Color Flow   Result Value Ref Range    LV LATERAL E/E' RATIO 11.63 m/s    LA WIDTH 5.00 cm    AORTIC VALVE CUSP SEPERATION 1.98 cm    TDI LATERAL 0.08 m/s    PV PEAK VELOCITY 0.74 cm/s    LVIDD 5.65 3.5 - 6.0 cm    IVS 1.02 0.6 - 1.1 cm    PW 1.08 0.6 - 1.1 cm    Ao root annulus 2.81 cm    LVIDS 5.32 (A) 2.1 - 4.0 cm    FS 6 28 - 44 %    IVC proximal 1.6 cm    LA volume 81.44 cm3    Sinus 2.49 cm    LV mass 237.82 g    LA size 3.30 cm    RVDD 2.36 cm    TAPSE 1.95 cm    Left Ventricle Relative Wall Thickness 0.38 cm    AV mean gradient 2 mmHg    AV valve area 2.78 cm2    AV Velocity Ratio 0.89     AV index (prosthetic) 0.92     MV valve area by continuity eq 1.93 cm2    Pulm vein S/D ratio 0.50     LVOT diameter 1.96 cm    LVOT area 3.0 cm2    LVOT  peak robert 0.79 m/s    LVOT peak VTI 12.17 cm    Ao peak robert 0.89 m/s    Ao VTI 13.19 cm    LVOT stroke volume 36.70 cm3    AV peak gradient 3 mmHg    MV Peak E Robert 0.93 m/s    TR Max Robert 2.94 m/s    MV VTI 19 cm    PV Peak S Robert 0.32 m/s    PV Peak D Robert 0.64 m/s    LV Systolic Volume 136.60 mL    LV Diastolic Volume 156.68 mL    RA Major Axis 4.83 cm    Left Atrium Minor Axis 5.95 cm    Left Atrium Major Axis 5.67 cm    Triscuspid Valve Regurgitation Peak Gradient 35 mmHg    RA Width 5.41 cm    Right Atrial Pressure (from IVC) 3 mmHg    TV rest pulmonary artery pressure 38 mmHg    Narrative    · Eccentric left ventricular hypertrophy.  · Moderate left ventricular enlargement.  · Severely decreased left ventricular systolic function. The estimated   ejection fraction is 15%  · Grade II (moderate) left ventricular diastolic dysfunction consistent   with pseudonormalization.  · Mild tricuspid regurgitation.  · Mild mitral regurgitation.  · Mildly reduced right ventricular systolic function.  · Severe left atrial enlargement.  · Moderate right atrial enlargement.  · Elevated left atrial pressure.  · Normal central venous pressure (3 mm Hg).  · The estimated PA systolic pressure is 38 mm Hg

## 2020-02-12 NOTE — PROGRESS NOTES
[facility]  Cardiology  Progress Note    Patient Name: Dima Quintanilla  MRN: [id number]  Admission Date: 2/11/2020  Hospital Length of Stay: 0 days  Code Status: Full Code   Attending Physician: Jordin Kincaid MD   Primary Care Physician: Sweetie Riojas MD  Expected Discharge Date:   Principal Problem:Acute on chronic combined systolic and diastolic congestive heart failure    Subjective:     Hospital Course:   02/11/2020 Presented progressive LE edema for past 3 days. BNP 2671, edematous on exam. Given Lasix 80 mg IV in the ED and started on IV Lasix 60mg BID. Admitted to [facility] for further observation  2/12/2020 On IV Lasix 60mg BID with 2.6 liters out overnight negative 2.6 liters since admission likely slightly less. K+ 3.6 with K+ replacement ordered. Creatinine 1.5 essentially unchanged from 1.4 yesterday. Short run of NSVT noted on telemetry overnight. Continued on BB therapy and will add Entresto to regimen        Review of Systems   Constitution: Negative for chills, decreased appetite, diaphoresis, fever, malaise/fatigue, weight gain and weight loss.   Cardiovascular: Positive for leg swelling. Negative for chest pain, claudication, dyspnea on exertion, irregular heartbeat, near-syncope, orthopnea, palpitations and paroxysmal nocturnal dyspnea.   Respiratory: Negative for cough, shortness of breath, snoring, sputum production and wheezing.    Endocrine: Negative for cold intolerance, heat intolerance, polydipsia, polyphagia and polyuria.   Skin: Negative for color change, dry skin, itching, nail changes and poor wound healing.   Musculoskeletal: Negative for back pain, gout, joint pain and joint swelling.   Gastrointestinal: Negative for bloating, abdominal pain, constipation, diarrhea, hematemesis, hematochezia, melena, nausea and vomiting.   Genitourinary: Negative for dysuria, hematuria and nocturia.   Neurological: Negative for dizziness, headaches, light-headedness,  numbness, paresthesias and weakness.   Psychiatric/Behavioral: Negative for altered mental status, depression and memory loss.     Objective:     Vital Signs (Most Recent):  Temp: 98 °F (36.7 °C) (02/12/20 0727)  Pulse: 66 (02/12/20 0807)  Resp: 18 (02/12/20 0727)  BP: 131/79 (02/12/20 0727)  SpO2: 96 % (02/12/20 0900) Vital Signs (24h Range):  Temp:  [97.6 °F (36.4 °C)-98.9 °F (37.2 °C)] 98 °F (36.7 °C)  Pulse:  [66-93] 66  Resp:  [16-29] 18  SpO2:  [95 %-100 %] 96 %  BP: (125-149)/(79-97) 131/79     Weight: 66.2 kg (145 lb 15.1 oz)  Body mass index is 23.56 kg/m².     SpO2: 96 %  O2 Device (Oxygen Therapy): room air      Intake/Output Summary (Last 24 hours) at 2/12/2020 0953  Last data filed at 2/12/2020 0900  Gross per 24 hour   Intake 480 ml   Output 2640 ml   Net -2160 ml       Lines/Drains/Airways     Peripheral Intravenous Line                 Peripheral IV - Single Lumen 02/11/20 1218 20 G Right Forearm less than 1 day                Physical Exam   Constitutional: He is oriented to person, place, and time. He appears well-developed and well-nourished. No distress.   Neck: Normal range of motion. Neck supple. No JVD present.   Cardiovascular: Normal rate and regular rhythm. Exam reveals no gallop.   No murmur heard.  Pulmonary/Chest: Effort normal and breath sounds normal. No respiratory distress. He has no wheezes.   Abdominal: Soft. Bowel sounds are normal. He exhibits no distension. There is no tenderness.   Musculoskeletal: He exhibits edema.   Neurological: He is alert and oriented to person, place, and time.   Skin: Skin is warm and dry.   Psychiatric: He has a normal mood and affect. His behavior is normal. Judgment and thought content normal.       Significant Labs:     Recent Labs   Lab 02/12/20  0512   WBC 5.28   RBC 5.40   HGB 14.0   HCT 44.1      MCV 82   MCH 25.9*   MCHC 31.7*     Recent Labs   Lab 02/12/20  0512      K 3.6      CO2 30*   BUN 18   CREATININE 1.5*   MG 1.8        Significant Imaging: Echocardiogram:   Transthoracic echo (TTE) complete (Cupid Only):   Results for orders placed or performed during the hospital encounter of 08/16/19   Transthoracic echo (TTE) 2D with Color Flow   Result Value Ref Range    LV LATERAL E/E' RATIO 11.63 m/s    LA WIDTH 5.00 cm    AORTIC VALVE CUSP SEPERATION 1.98 cm    TDI LATERAL 0.08 m/s    PV PEAK VELOCITY 0.74 cm/s    LVIDD 5.65 3.5 - 6.0 cm    IVS 1.02 0.6 - 1.1 cm    PW 1.08 0.6 - 1.1 cm    Ao root annulus 2.81 cm    LVIDS 5.32 (A) 2.1 - 4.0 cm    FS 6 28 - 44 %    IVC proximal 1.6 cm    LA volume 81.44 cm3    Sinus 2.49 cm    LV mass 237.82 g    LA size 3.30 cm    RVDD 2.36 cm    TAPSE 1.95 cm    Left Ventricle Relative Wall Thickness 0.38 cm    AV mean gradient 2 mmHg    AV valve area 2.78 cm2    AV Velocity Ratio 0.89     AV index (prosthetic) 0.92     MV valve area by continuity eq 1.93 cm2    Pulm vein S/D ratio 0.50     LVOT diameter 1.96 cm    LVOT area 3.0 cm2    LVOT peak robert 0.79 m/s    LVOT peak VTI 12.17 cm    Ao peak robert 0.89 m/s    Ao VTI 13.19 cm    LVOT stroke volume 36.70 cm3    AV peak gradient 3 mmHg    MV Peak E Robert 0.93 m/s    TR Max Robert 2.94 m/s    MV VTI 19 cm    PV Peak S Robert 0.32 m/s    PV Peak D Robert 0.64 m/s    LV Systolic Volume 136.60 mL    LV Diastolic Volume 156.68 mL    RA Major Axis 4.83 cm    Left Atrium Minor Axis 5.95 cm    Left Atrium Major Axis 5.67 cm    Triscuspid Valve Regurgitation Peak Gradient 35 mmHg    RA Width 5.41 cm    Right Atrial Pressure (from IVC) 3 mmHg    TV rest pulmonary artery pressure 38 mmHg    Narrative    · Eccentric left ventricular hypertrophy.  · Moderate left ventricular enlargement.  · Severely decreased left ventricular systolic function. The estimated   ejection fraction is 15%  · Grade II (moderate) left ventricular diastolic dysfunction consistent   with pseudonormalization.  · Mild tricuspid regurgitation.  · Mild mitral regurgitation.  · Mildly reduced right  ventricular systolic function.  · Severe left atrial enlargement.  · Moderate right atrial enlargement.  · Elevated left atrial pressure.  · Normal central venous pressure (3 mm Hg).  · The estimated PA systolic pressure is 38 mm Hg        Assessment and Plan:     Brief HPI: Seen this morning on AM rounds with Dr. Solorzano. Discussed cardiac POC as detailed below-verbalized understanding and agrees with POC     * Acute on chronic combined systolic and diastolic congestive heart failure  -recent echo LVEF 15%, CRT-D  -ADHF related to to dietary sodium indiscretion and liberal fluid intake  -on IV Lasix 60mg BID with 2.6 liters out overnight; will continue with aggressive diuresis   -continue BB; will place on Entresto   -troponin mildly elevated in setting of ADHF  -[facility] in 05/2019 normal coronary arteries      Presence of cardiac resynchronization therapy defibrillator (CRT-D)  -recent interrogation with normal device function   -short run of NSVT (6-8 beats)noted on telemetry overnight-no AICD firing   -will replace K+ this AM with goal K+ level >4.0  -continue to monitor on telemetry     Essential hypertension  -SBP 130s-140s overnight  -continue BB; will place on Entresto         VTE Risk Mitigation (From admission, onward)         Ordered     enoxaparin injection 40 mg  Daily      02/11/20 1527     Place ELVIN hose  Until discontinued      02/11/20 1527     Place sequential compression device  Until discontinued      02/11/20 1527     IP VTE HIGH RISK PATIENT  Once      02/11/20 1527                FERNANDA Soni, ANP  Cardiology  [facility]

## 2020-02-12 NOTE — PT/OT/SLP PROGRESS
Occupational Therapy  SCREEN    Patient Name:  Dima Quintanilla   MRN:  [id number]    Orders received. Chart reviewed.  Pt found [facility] & states that he lives w/ signif other in [place] w/ 2STE & 0HR; t/s.  PLOF: (I) w/o DME w/ all fxnl tasks incl sit/stand tub baths, IADLs, driving & playing pool.  Pt w/o change in fxnl status from PLOF & no further OT svcs indicated at this time.    ALISHA Joshua  2/12/2020

## 2020-02-12 NOTE — ASSESSMENT & PLAN NOTE
-recent interrogation with normal device function   -short run of NSVT (6-8 beats)noted on telemetry overnight-no AICD firing   -will replace K+ this AM with goal K+ level >4.0  -continue to monitor on telemetry

## 2020-02-13 VITALS
WEIGHT: 142.19 LBS | DIASTOLIC BLOOD PRESSURE: 77 MMHG | OXYGEN SATURATION: 97 % | SYSTOLIC BLOOD PRESSURE: 122 MMHG | BODY MASS INDEX: 22.85 KG/M2 | RESPIRATION RATE: 18 BRPM | HEART RATE: 90 BPM | HEIGHT: 66 IN | TEMPERATURE: 99 F

## 2020-02-13 DIAGNOSIS — I50.43 ACUTE ON CHRONIC COMBINED SYSTOLIC AND DIASTOLIC CONGESTIVE HEART FAILURE: Primary | ICD-10-CM

## 2020-02-13 LAB
ALBUMIN SERPL BCP-MCNC: 2.9 G/DL (ref 3.5–5.2)
ALP SERPL-CCNC: 90 U/L (ref 55–135)
ALT SERPL W/O P-5'-P-CCNC: 17 U/L (ref 10–44)
ANION GAP SERPL CALC-SCNC: 9 MMOL/L (ref 8–16)
AST SERPL-CCNC: 18 U/L (ref 10–40)
BASOPHILS # BLD AUTO: 0.07 K/UL (ref 0–0.2)
BASOPHILS NFR BLD: 1.1 % (ref 0–1.9)
BILIRUB SERPL-MCNC: 0.9 MG/DL (ref 0.1–1)
BUN SERPL-MCNC: 18 MG/DL (ref 8–23)
CALCIUM SERPL-MCNC: 9 MG/DL (ref 8.7–10.5)
CHLORIDE SERPL-SCNC: 97 MMOL/L (ref 95–110)
CO2 SERPL-SCNC: 33 MMOL/L (ref 23–29)
CREAT SERPL-MCNC: 1.4 MG/DL (ref 0.5–1.4)
DIFFERENTIAL METHOD: ABNORMAL
EOSINOPHIL # BLD AUTO: 0.4 K/UL (ref 0–0.5)
EOSINOPHIL NFR BLD: 5.8 % (ref 0–8)
ERYTHROCYTE [DISTWIDTH] IN BLOOD BY AUTOMATED COUNT: 18.3 % (ref 11.5–14.5)
EST. GFR  (AFRICAN AMERICAN): >60 ML/MIN/1.73 M^2
EST. GFR  (NON AFRICAN AMERICAN): 53 ML/MIN/1.73 M^2
GLUCOSE SERPL-MCNC: 100 MG/DL (ref 70–110)
HCT VFR BLD AUTO: 51.2 % (ref 40–54)
HGB BLD-MCNC: 16.7 G/DL (ref 14–18)
IMM GRANULOCYTES # BLD AUTO: 0.01 K/UL (ref 0–0.04)
IMM GRANULOCYTES NFR BLD AUTO: 0.2 % (ref 0–0.5)
IRON SERPL-MCNC: 51 UG/DL (ref 45–160)
LYMPHOCYTES # BLD AUTO: 1.4 K/UL (ref 1–4.8)
LYMPHOCYTES NFR BLD: 22.3 % (ref 18–48)
MAGNESIUM SERPL-MCNC: 1.8 MG/DL (ref 1.6–2.6)
MCH RBC QN AUTO: 26 PG (ref 27–31)
MCHC RBC AUTO-ENTMCNC: 32.6 G/DL (ref 32–36)
MCV RBC AUTO: 80 FL (ref 82–98)
MONOCYTES # BLD AUTO: 0.6 K/UL (ref 0.3–1)
MONOCYTES NFR BLD: 9.2 % (ref 4–15)
NEUTROPHILS # BLD AUTO: 3.8 K/UL (ref 1.8–7.7)
NEUTROPHILS NFR BLD: 61.4 % (ref 38–73)
NRBC BLD-RTO: 0 /100 WBC
PHOSPHATE SERPL-MCNC: 3.7 MG/DL (ref 2.7–4.5)
PLATELET # BLD AUTO: 186 K/UL (ref 150–350)
PMV BLD AUTO: 13 FL (ref 9.2–12.9)
POTASSIUM SERPL-SCNC: 3.8 MMOL/L (ref 3.5–5.1)
PROT SERPL-MCNC: 6 G/DL (ref 6–8.4)
RBC # BLD AUTO: 6.42 M/UL (ref 4.6–6.2)
SATURATED IRON: 13 % (ref 20–50)
SODIUM SERPL-SCNC: 139 MMOL/L (ref 136–145)
TOTAL IRON BINDING CAPACITY: 395 UG/DL (ref 250–450)
TRANSFERRIN SERPL-MCNC: 267 MG/DL (ref 200–375)
WBC # BLD AUTO: 6.18 K/UL (ref 3.9–12.7)

## 2020-02-13 PROCEDURE — 36415 COLL VENOUS BLD VENIPUNCTURE: CPT

## 2020-02-13 PROCEDURE — 25000003 PHARM REV CODE 250: Performed by: STUDENT IN AN ORGANIZED HEALTH CARE EDUCATION/TRAINING PROGRAM

## 2020-02-13 PROCEDURE — 99233 PR SUBSEQUENT HOSPITAL CARE,LEVL III: ICD-10-PCS | Mod: ,,, | Performed by: INTERNAL MEDICINE

## 2020-02-13 PROCEDURE — 11000001 HC ACUTE MED/SURG PRIVATE ROOM

## 2020-02-13 PROCEDURE — 99233 SBSQ HOSP IP/OBS HIGH 50: CPT | Mod: ,,, | Performed by: INTERNAL MEDICINE

## 2020-02-13 PROCEDURE — 80053 COMPREHEN METABOLIC PANEL: CPT

## 2020-02-13 PROCEDURE — 85025 COMPLETE CBC W/AUTO DIFF WBC: CPT

## 2020-02-13 PROCEDURE — 84100 ASSAY OF PHOSPHORUS: CPT

## 2020-02-13 PROCEDURE — 83735 ASSAY OF MAGNESIUM: CPT

## 2020-02-13 RX ORDER — CARVEDILOL 3.12 MG/1
3.12 TABLET ORAL 2 TIMES DAILY
Qty: 60 TABLET | Refills: 11 | Status: SHIPPED | OUTPATIENT
Start: 2020-02-13 | End: 2020-02-13

## 2020-02-13 RX ORDER — ASPIRIN 81 MG/1
81 TABLET ORAL DAILY
Qty: 90 TABLET | Refills: 3 | Status: SHIPPED | OUTPATIENT
Start: 2020-02-13 | End: 2022-06-17

## 2020-02-13 RX ORDER — CARVEDILOL 6.25 MG/1
6.25 TABLET ORAL 2 TIMES DAILY
Qty: 60 TABLET | Refills: 11 | Status: SHIPPED | OUTPATIENT
Start: 2020-02-13 | End: 2021-04-30

## 2020-02-13 RX ORDER — FUROSEMIDE 40 MG/1
40 TABLET ORAL DAILY
Qty: 30 TABLET | Refills: 11 | Status: SHIPPED | OUTPATIENT
Start: 2020-02-13 | End: 2021-04-14 | Stop reason: SDUPTHER

## 2020-02-13 RX ORDER — FUROSEMIDE 40 MG/1
40 TABLET ORAL DAILY
Status: DISCONTINUED | OUTPATIENT
Start: 2020-02-13 | End: 2020-02-14 | Stop reason: HOSPADM

## 2020-02-13 RX ORDER — BUDESONIDE AND FORMOTEROL FUMARATE DIHYDRATE 160; 4.5 UG/1; UG/1
2 AEROSOL RESPIRATORY (INHALATION) EVERY 12 HOURS
Qty: 3 INHALER | Refills: 4 | Status: SHIPPED | OUTPATIENT
Start: 2020-02-13 | End: 2020-10-08

## 2020-02-13 RX ORDER — CARVEDILOL 3.12 MG/1
6.25 TABLET ORAL 2 TIMES DAILY
Qty: 120 TABLET | Refills: 11 | Status: SHIPPED | OUTPATIENT
Start: 2020-02-13 | End: 2020-02-13

## 2020-02-13 RX ORDER — ATORVASTATIN CALCIUM 40 MG/1
40 TABLET, FILM COATED ORAL NIGHTLY
Qty: 90 TABLET | Refills: 3 | Status: SHIPPED | OUTPATIENT
Start: 2020-02-13 | End: 2021-02-04

## 2020-02-13 RX ORDER — ALBUTEROL SULFATE 0.63 MG/3ML
0.63 SOLUTION RESPIRATORY (INHALATION) EVERY 6 HOURS PRN
Qty: 1 VIAL | Refills: 5 | Status: SHIPPED | OUTPATIENT
Start: 2020-02-13 | End: 2021-10-12 | Stop reason: SDUPTHER

## 2020-02-13 RX ORDER — ALBUTEROL SULFATE 90 UG/1
2 AEROSOL, METERED RESPIRATORY (INHALATION) EVERY 6 HOURS PRN
Qty: 18 G | Refills: 11 | Status: SHIPPED | OUTPATIENT
Start: 2020-02-13 | End: 2021-05-07 | Stop reason: SDUPTHER

## 2020-02-13 RX ADMIN — FLUTICASONE PROPIONATE 100 MCG: 50 SPRAY, METERED NASAL at 09:02

## 2020-02-13 RX ADMIN — BUDESONIDE AND FORMOTEROL FUMARATE DIHYDRATE 2 PUFF: 80; 4.5 AEROSOL RESPIRATORY (INHALATION) at 09:02

## 2020-02-13 RX ADMIN — FUROSEMIDE 40 MG: 40 TABLET ORAL at 09:02

## 2020-02-13 RX ADMIN — ASPIRIN 81 MG: 81 TABLET, COATED ORAL at 09:02

## 2020-02-13 RX ADMIN — CARVEDILOL 3.12 MG: 3.12 TABLET, FILM COATED ORAL at 09:02

## 2020-02-13 RX ADMIN — SACUBITRIL AND VALSARTAN 1 TABLET: 49; 51 TABLET, FILM COATED ORAL at 09:02

## 2020-02-13 NOTE — NURSING
Pt is AAOx3 in NAD, VSS, IV removed with catheter intact.  Telebox removed and returned to telemetry.  No complaints of pain or SOB  at this time. Discharge instructions printed and given to pt with explanation/pt verbalized understanding.  Pt scripts given to him by provider/pt opted not to have filled by [facility].

## 2020-02-13 NOTE — PROGRESS NOTES
[facility]  Cardiology  Progress Note    Patient Name: Dima Quintanilla  MRN: [id number]  Admission Date: 2/11/2020  Hospital Length of Stay: 1 days  Code Status: Full Code   Attending Physician: Jordin Kincaid MD   Primary Care Physician: Sweetie Riojas MD  Expected Discharge Date:   Principal Problem:Acute on chronic combined systolic and diastolic congestive heart failure    Subjective:     Hospital Course:   02/11/2020 Presented progressive LE edema for past 3 days. BNP 2671, edematous on exam. Given Lasix 80 mg IV in the ED and started on IV Lasix 60mg BID. Admitted to [facility] for further observation  2/12/2020 On IV Lasix 60mg BID with 2.6 liters out overnight negative 2.6 liters since admission likely slightly less. K+ 3.6 with K+ replacement ordered. Creatinine 1.5 essentially unchanged from 1.4 yesterday. Short run of NSVT noted on telemetry overnight. Continued on BB therapy and will add Entresto to regimen  2/13/2020 Remains on IV Lasix BID with 4.0 liters out overnight negative 3.0 liters since admission. BMP and CBC stable. HR and BP stable overnight. Reports SOB resolved and ambulating around room without any complaints-will ambulate in hallway today         Review of Systems   Constitution: Negative for chills, decreased appetite, diaphoresis, fever, malaise/fatigue, weight gain and weight loss.   Cardiovascular: Negative for chest pain, claudication, dyspnea on exertion, irregular heartbeat, leg swelling, near-syncope, orthopnea, palpitations and paroxysmal nocturnal dyspnea.   Respiratory: Negative for cough, shortness of breath, snoring, sputum production and wheezing.    Endocrine: Negative for cold intolerance, heat intolerance, polydipsia, polyphagia and polyuria.   Skin: Negative for color change, dry skin, itching, nail changes and poor wound healing.   Musculoskeletal: Negative for back pain, gout, joint pain and joint swelling.   Gastrointestinal: Negative for  bloating, abdominal pain, constipation, diarrhea, hematemesis, hematochezia, melena, nausea and vomiting.   Genitourinary: Negative for dysuria, hematuria and nocturia.   Neurological: Negative for dizziness, headaches, light-headedness, numbness, paresthesias and weakness.   Psychiatric/Behavioral: Negative for altered mental status, depression and memory loss.     Objective:     Vital Signs (Most Recent):  Temp: 98.5 °F (36.9 °C) (02/13/20 0728)  Pulse: 93 (02/13/20 0900)  Resp: 18 (02/13/20 0900)  BP: 120/77 (02/13/20 0728)  SpO2: 97 % (02/13/20 0728) Vital Signs (24h Range):  Temp:  [98.2 °F (36.8 °C)-98.9 °F (37.2 °C)] 98.5 °F (36.9 °C)  Pulse:  [68-97] 93  Resp:  [18-20] 18  SpO2:  [95 %-97 %] 97 %  BP: (116-137)/(75-85) 120/77     Weight: 64.5 kg (142 lb 3.2 oz)  Body mass index is 22.95 kg/m².     SpO2: 97 %  O2 Device (Oxygen Therapy): room air      Intake/Output Summary (Last 24 hours) at 2/13/2020 0936  Last data filed at 2/13/2020 0300  Gross per 24 hour   Intake 480 ml   Output 4000 ml   Net -3520 ml       Lines/Drains/Airways     Peripheral Intravenous Line                 Peripheral IV - Single Lumen 02/11/20 1218 20 G Right Forearm 1 day                Physical Exam   Constitutional: He is oriented to person, place, and time. He appears well-developed and well-nourished. No distress.   Neck: Normal range of motion. Neck supple. No JVD present.   Cardiovascular: Normal rate and regular rhythm. Exam reveals no gallop.   No murmur heard.  Pulmonary/Chest: Effort normal and breath sounds normal. No respiratory distress. He has no wheezes.   Abdominal: Soft. Bowel sounds are normal. He exhibits no distension. There is no tenderness.   Musculoskeletal: He exhibits no edema.   Neurological: He is alert and oriented to person, place, and time.   Skin: Skin is warm and dry.   Psychiatric: He has a normal mood and affect. His behavior is normal. Judgment and thought content normal.       Significant Labs:      Recent Labs   Lab 02/13/20  0633   WBC 6.18   RBC 6.42*   HGB 16.7   HCT 51.2      MCV 80*   MCH 26.0*   MCHC 32.6     Recent Labs   Lab 02/13/20  0633      K 3.8   CL 97   CO2 33*   BUN 18   CREATININE 1.4   MG 1.8       Significant Imaging: Echocardiogram:   Transthoracic echo (TTE) complete (Cupid Only):   Results for orders placed or performed during the hospital encounter of 08/16/19   Transthoracic echo (TTE) 2D with Color Flow   Result Value Ref Range    LV LATERAL E/E' RATIO 11.63 m/s    LA WIDTH 5.00 cm    AORTIC VALVE CUSP SEPERATION 1.98 cm    TDI LATERAL 0.08 m/s    PV PEAK VELOCITY 0.74 cm/s    LVIDD 5.65 3.5 - 6.0 cm    IVS 1.02 0.6 - 1.1 cm    PW 1.08 0.6 - 1.1 cm    Ao root annulus 2.81 cm    LVIDS 5.32 (A) 2.1 - 4.0 cm    FS 6 28 - 44 %    IVC proximal 1.6 cm    LA volume 81.44 cm3    Sinus 2.49 cm    LV mass 237.82 g    LA size 3.30 cm    RVDD 2.36 cm    TAPSE 1.95 cm    Left Ventricle Relative Wall Thickness 0.38 cm    AV mean gradient 2 mmHg    AV valve area 2.78 cm2    AV Velocity Ratio 0.89     AV index (prosthetic) 0.92     MV valve area by continuity eq 1.93 cm2    Pulm vein S/D ratio 0.50     LVOT diameter 1.96 cm    LVOT area 3.0 cm2    LVOT peak robert 0.79 m/s    LVOT peak VTI 12.17 cm    Ao peak robert 0.89 m/s    Ao VTI 13.19 cm    LVOT stroke volume 36.70 cm3    AV peak gradient 3 mmHg    MV Peak E Robert 0.93 m/s    TR Max Robert 2.94 m/s    MV VTI 19 cm    PV Peak S Robert 0.32 m/s    PV Peak D Robert 0.64 m/s    LV Systolic Volume 136.60 mL    LV Diastolic Volume 156.68 mL    RA Major Axis 4.83 cm    Left Atrium Minor Axis 5.95 cm    Left Atrium Major Axis 5.67 cm    Triscuspid Valve Regurgitation Peak Gradient 35 mmHg    RA Width 5.41 cm    Right Atrial Pressure (from IVC) 3 mmHg    TV rest pulmonary artery pressure 38 mmHg    Narrative    · Eccentric left ventricular hypertrophy.  · Moderate left ventricular enlargement.  · Severely decreased left ventricular systolic function. The  estimated   ejection fraction is 15%  · Grade II (moderate) left ventricular diastolic dysfunction consistent   with pseudonormalization.  · Mild tricuspid regurgitation.  · Mild mitral regurgitation.  · Mildly reduced right ventricular systolic function.  · Severe left atrial enlargement.  · Moderate right atrial enlargement.  · Elevated left atrial pressure.  · Normal central venous pressure (3 mm Hg).  · The estimated PA systolic pressure is 38 mm Hg        Assessment and Plan:     Brief HPI: Seen on AM rounds with Dr. Pabon while sitting in bedside chair. Denies any complaints and reports SOB improved. Will ambulate in hallway this morning     * Acute on chronic combined systolic and diastolic congestive heart failure  -recent echo LVEF 15%, CRT-D  -ADHF related to to dietary sodium indiscretion and liberal fluid intake  -remains on IV Lasix 60mg BID with 4.0 liters out overnight; negative 5.6 liters since admission  -SOB improved per patient; will ambulate in hallway today    -will continue BB and Entresto upon discharge   -troponin mildly elevated in setting of ADHF  -[facility] in 05/2019 normal coronary arteries      Presence of cardiac resynchronization therapy defibrillator (CRT-D)  -recent interrogation with normal device function   -no recurrent  NSVT overnight on telemetry     Essential hypertension  -SBP 120s-130s overnight  -continue BB and Entresto         VTE Risk Mitigation (From admission, onward)         Ordered     enoxaparin injection 40 mg  Daily      02/11/20 1527     Place ELVIN hose  Until discontinued      02/11/20 1527     Place sequential compression device  Until discontinued      02/11/20 1527     IP VTE HIGH RISK PATIENT  Once      02/11/20 1527              Recommend ambulation in hallway-if no SOB with exertion then suitable for transition back to oral Lasix  daily along with continuation of Entresto and Coreg and suitable for discharge home with follow up with Dr. Pabon in 1-2 weeks  with BMP and BNP prior to appt (office notified). Lasix up titrated to 40mg per primary team and will need close follow up with up titration of Lasix and concomitant use of FERNANDA Willadr, ANP  Cardiology  [facility]

## 2020-02-13 NOTE — ASSESSMENT & PLAN NOTE
-recent echo LVEF 15%, CRT-D  -ADHF related to to dietary sodium indiscretion and liberal fluid intake  -remains on IV Lasix 60mg BID with 4.0 liters out overnight; negative 5.6 liters since admission  -SOB improved per patient; will ambulate in hallway today    -will continue BB and Entresto upon discharge   -troponin mildly elevated in setting of ADHF  -[facility] in 05/2019 normal coronary arteries

## 2020-02-13 NOTE — PLAN OF CARE
Pt being d/c home and voices no needs. PCC appt scheduled and CHF education reviewed and reinforced with pt to review booklet at home with girlfriend.  Pt REFUSES for prescriptions to be filled at [facility] . CIPRIANO Salcido nurse has given pt all printed prescriptions and TN discussed with pt importance of getting mediations filled today and to take as ordered. Pt verbalized understanding. TN sent referral to [facility] as pt is high risk for readmit.   Pt stated his girlfriend will provide help at home as needed and he has transportation home.  Pt has d/ folder, brochure, and CHF booklet and TN business card. PT encouraged to call for any further needs/concerns.       02/13/20 1109   Final Note   Assessment Type Final Discharge Note   Anticipated Discharge Disposition Home   What phone number can be called within the next 1-3 days to see how you are doing after discharge? [phone]   Hospital Follow Up  Appt(s) scheduled? Yes   Discharge plans and expectations educations in teach back method with documentation complete? Yes   Right Care Referral Info   Post Acute Recommendation No Care

## 2020-02-13 NOTE — PROGRESS NOTES
[facility] Internal Med Resident BULL Progress Note    Subjective:      Dima Quintanilla is a 64 y.o. male who is being followed by the [facility] Internal Med Team B service at [facility] for acute on chronic exacerbation of systolic and diastolic heart failure.     Overnight Mr. Quintanilla did well. He continued to have a good amount of urine output and is no longer complaining of shortness of breath.      Objective:   Last 24 Hour Vital Signs:  BP  Min: 116/84  Max: 137/81  Temp  Av.5 °F (36.9 °C)  Min: 98 °F (36.7 °C)  Max: 98.9 °F (37.2 °C)  Pulse  Av.9  Min: 66  Max: 89  Resp  Av.3  Min: 18  Max: 20  SpO2  Av.5 %  Min: 95 %  Max: 97 %  Weight  Av.5 kg (142 lb 3.2 oz)  Min: 64.5 kg (142 lb 3.2 oz)  Max: 64.5 kg (142 lb 3.2 oz)  I/O last 3 completed shifts:  In: 960 [P.O.:960]  Out: 5540 [Urine:5540]    Physical Examination:  General: alert and oriented, NAD, sitting up in the chair eating breakfast  HEENT:  EOMI, no scleral icterus, moist mucous membranes  CV: RRR, no murmurs or rubs  Resp: bibasilar crackles much improved, no wheezing or rhonchi   Abd: soft and full, non-tender  Ext: 2+ pitting edema at the ankle, much improved  Skin: warm and dry   Psych: appropriate affect     Laboratory:  Laboratory Data Reviewed: yes  Pertinent Findings:  Recent Labs   Lab 20  1215 20  0512 20  0633   WBC 5.39 5.28 6.18   HGB 14.1 14.0 16.7   HCT 43.7 44.1 51.2    184 186   MCV 82 82 80*   RDW 17.6* 17.5* 18.3*    140 139   K 4.1 3.6 3.8    100 97   CO2 28 30* 33*   BUN 15 18 18   CREATININE 1.4 1.5* 1.4   GLU 78 93 100   PROT 6.3 5.7* 6.0   ALBUMIN 3.2* 2.9* 2.9*   BILITOT 0.9 0.9 0.9   AST 21 19 18   ALKPHOS 93 82 90   ALT 21 16 17        Microbiology Data Reviewed: yes  Pertinent Findings:  N/a    Other Results:  EKG (my interpretation): None new    Radiology Data Reviewed: yes  Pertinent Findings:  None new     Current Medications:     Infusions:        Scheduled:   aspirin  81 mg Oral Daily    atorvastatin  40 mg Oral QHS    budesonide-formoterol 80-4.5 mcg  2 puff Inhalation BID    carvediloL  3.125 mg Oral BID    enoxaparin  40 mg Subcutaneous Daily    fluticasone propionate  2 spray Each Nostril Daily    furosemide (LASIX) IV  60 mg Intravenous BID    sacubitril-valsartan  1 tablet Oral BID        PRN:  albuterol-ipratropium, sodium chloride 0.9%    Antibiotics and Day Number of Therapy:  None    Lines and Day Number of Therapy:  PIV     Assessment:     Dima Quintanilla is a 64 y.o.male with h/o asthma, COPD and CHF s/p AICD placement and pacemaker, who presented to [facility] complaining of 4 days of bilateral lower extremity swelling consistent with volume overload in the setting of severe combined heart failure.     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 On arrival, he was afebrile with stable vitals, (SpO2 97% on room air).   -2+ pitting edema in bilateral LE  -BNP: 2671, CXR with mild cardiomegaly but no pulmonary edema appreciated   -Troponins stable, no need to continue to trend   -consulted cardiology, appreciate recs  -Good UOP, will transition to po Lasix 40mg daily with close follow up and instructions  to double up on Lasix when he has gained weight   -Started Entresto yesterday, pt tolerating well      Asthma:  Patient has documented history of asthma with prior history of hospitalizations for exacerbation requiring intubation (most recently 2015)  -Home meds include: symbicort with rescue albuterol and nebulizer. Patient uses symbicort and nebulizer daily. Uses nebulizer once a day every morning and symbicort BID with rescue albuterol as needed.   - PRN Duo-nebs for wheezing   - Pt is out of home symbicort and pharmacy needs to order - refilled rx   - Continue to monitor for worsening respiratory status      HTN:  - On Coreg 3.125 BID, losartan 12.5 mg daily at home   - Will continue both while inpatient      Code: Full  Diet: Cardiac   Ppx: Lovenox   Dispo: discharge likely today     Lorraine Piper MD  [facility] Hospitalist Team B  [facility] Med Peds HO-1  02/13/2020 6:17 AM      [facility] Medicine Hospitalist Pager numbers:   [facility] Hospitalist Medicine Team A (Yvonne/Bonny): [phone]  [facility] Hospitalist Medicine Team B (Sanjiv/Holly):  [phone]

## 2020-02-13 NOTE — SUBJECTIVE & OBJECTIVE
Review of Systems   Constitution: Negative for chills, decreased appetite, diaphoresis, fever, malaise/fatigue, weight gain and weight loss.   Cardiovascular: Negative for chest pain, claudication, dyspnea on exertion, irregular heartbeat, leg swelling, near-syncope, orthopnea, palpitations and paroxysmal nocturnal dyspnea.   Respiratory: Negative for cough, shortness of breath, snoring, sputum production and wheezing.    Endocrine: Negative for cold intolerance, heat intolerance, polydipsia, polyphagia and polyuria.   Skin: Negative for color change, dry skin, itching, nail changes and poor wound healing.   Musculoskeletal: Negative for back pain, gout, joint pain and joint swelling.   Gastrointestinal: Negative for bloating, abdominal pain, constipation, diarrhea, hematemesis, hematochezia, melena, nausea and vomiting.   Genitourinary: Negative for dysuria, hematuria and nocturia.   Neurological: Negative for dizziness, headaches, light-headedness, numbness, paresthesias and weakness.   Psychiatric/Behavioral: Negative for altered mental status, depression and memory loss.     Objective:     Vital Signs (Most Recent):  Temp: 98.5 °F (36.9 °C) (02/13/20 0728)  Pulse: 93 (02/13/20 0900)  Resp: 18 (02/13/20 0900)  BP: 120/77 (02/13/20 0728)  SpO2: 97 % (02/13/20 0728) Vital Signs (24h Range):  Temp:  [98.2 °F (36.8 °C)-98.9 °F (37.2 °C)] 98.5 °F (36.9 °C)  Pulse:  [68-97] 93  Resp:  [18-20] 18  SpO2:  [95 %-97 %] 97 %  BP: (116-137)/(75-85) 120/77     Weight: 64.5 kg (142 lb 3.2 oz)  Body mass index is 22.95 kg/m².     SpO2: 97 %  O2 Device (Oxygen Therapy): room air      Intake/Output Summary (Last 24 hours) at 2/13/2020 0936  Last data filed at 2/13/2020 0300  Gross per 24 hour   Intake 480 ml   Output 4000 ml   Net -3520 ml       Lines/Drains/Airways     Peripheral Intravenous Line                 Peripheral IV - Single Lumen 02/11/20 1218 20 G Right Forearm 1 day                Physical Exam   Constitutional:  He is oriented to person, place, and time. He appears well-developed and well-nourished. No distress.   Neck: Normal range of motion. Neck supple. No JVD present.   Cardiovascular: Normal rate and regular rhythm. Exam reveals no gallop.   No murmur heard.  Pulmonary/Chest: Effort normal and breath sounds normal. No respiratory distress. He has no wheezes.   Abdominal: Soft. Bowel sounds are normal. He exhibits no distension. There is no tenderness.   Musculoskeletal: He exhibits no edema.   Neurological: He is alert and oriented to person, place, and time.   Skin: Skin is warm and dry.   Psychiatric: He has a normal mood and affect. His behavior is normal. Judgment and thought content normal.       Significant Labs:     Recent Labs   Lab 02/13/20  0633   WBC 6.18   RBC 6.42*   HGB 16.7   HCT 51.2      MCV 80*   MCH 26.0*   MCHC 32.6     Recent Labs   Lab 02/13/20  0633      K 3.8   CL 97   CO2 33*   BUN 18   CREATININE 1.4   MG 1.8       Significant Imaging: Echocardiogram:   Transthoracic echo (TTE) complete (Cupid Only):   Results for orders placed or performed during the hospital encounter of 08/16/19   Transthoracic echo (TTE) 2D with Color Flow   Result Value Ref Range    LV LATERAL E/E' RATIO 11.63 m/s    LA WIDTH 5.00 cm    AORTIC VALVE CUSP SEPERATION 1.98 cm    TDI LATERAL 0.08 m/s    PV PEAK VELOCITY 0.74 cm/s    LVIDD 5.65 3.5 - 6.0 cm    IVS 1.02 0.6 - 1.1 cm    PW 1.08 0.6 - 1.1 cm    Ao root annulus 2.81 cm    LVIDS 5.32 (A) 2.1 - 4.0 cm    FS 6 28 - 44 %    IVC proximal 1.6 cm    LA volume 81.44 cm3    Sinus 2.49 cm    LV mass 237.82 g    LA size 3.30 cm    RVDD 2.36 cm    TAPSE 1.95 cm    Left Ventricle Relative Wall Thickness 0.38 cm    AV mean gradient 2 mmHg    AV valve area 2.78 cm2    AV Velocity Ratio 0.89     AV index (prosthetic) 0.92     MV valve area by continuity eq 1.93 cm2    Pulm vein S/D ratio 0.50     LVOT diameter 1.96 cm    LVOT area 3.0 cm2    LVOT peak heidi 0.79 m/s     LVOT peak VTI 12.17 cm    Ao peak robert 0.89 m/s    Ao VTI 13.19 cm    LVOT stroke volume 36.70 cm3    AV peak gradient 3 mmHg    MV Peak E Robert 0.93 m/s    TR Max Robert 2.94 m/s    MV VTI 19 cm    PV Peak S Robert 0.32 m/s    PV Peak D Robert 0.64 m/s    LV Systolic Volume 136.60 mL    LV Diastolic Volume 156.68 mL    RA Major Axis 4.83 cm    Left Atrium Minor Axis 5.95 cm    Left Atrium Major Axis 5.67 cm    Triscuspid Valve Regurgitation Peak Gradient 35 mmHg    RA Width 5.41 cm    Right Atrial Pressure (from IVC) 3 mmHg    TV rest pulmonary artery pressure 38 mmHg    Narrative    · Eccentric left ventricular hypertrophy.  · Moderate left ventricular enlargement.  · Severely decreased left ventricular systolic function. The estimated   ejection fraction is 15%  · Grade II (moderate) left ventricular diastolic dysfunction consistent   with pseudonormalization.  · Mild tricuspid regurgitation.  · Mild mitral regurgitation.  · Mildly reduced right ventricular systolic function.  · Severe left atrial enlargement.  · Moderate right atrial enlargement.  · Elevated left atrial pressure.  · Normal central venous pressure (3 mm Hg).  · The estimated PA systolic pressure is 38 mm Hg

## 2020-02-13 NOTE — DISCHARGE SUMMARY
\[facility]\"" Internal Med Team B Discharge Summary    Primary Team: \[facility]\"" Internal Med Team B  Attending Physician: Jordin Kincaid MD  Resident: Jacek  Intern: Phyllis    Date of Admit: 2/11/2020  Date of Discharge: 2/13/2020    Discharge to: home  Condition: stable    Discharge Diagnoses     Patient Active Problem List   Diagnosis    Anemia of other chronic disease    Moderate persistent asthma with exacerbation    Acute on chronic combined systolic and diastolic congestive heart failure    Shortness of breath    Cardiac rhythm disorder or disturbance or change    LBBB (left bundle branch block)    Essential hypertension    Presence of cardiac resynchronization therapy defibrillator (CRT-D)    NICM (nonischemic cardiomyopathy)       Consultants and Procedures     Consultants:  Cardiology    Procedures:   None    Brief History of Present Illness      Dima Quintanilla is a 64 y.o. male with h/o asthma, COPD, HFrEF (EF 15%), AICD/pacemaker, and HTN who presented to the ED for bilateral lower extremity swelling for 4 days after eating fried chicken. On admission his vitals were stable and he was not requiring any accessory oxygen. Exam notable for 2+ pitting edema to bilateral lower extremities to the thigh and crackles in bilateral lung fields without wheezing. BNP was 2671 and troponin was mildly elevated but EKG without changes. He was treated with IV diuresis. Cardiology was consulted and they felt he would benefit from initiating Entresto. He was started on Entresto on 2/12 and tolerated it well. He was discharged after significant diuresis and improvement in symptoms. Discharge medications included entresto and an increased dose of Lasix. He was told to schedule follow up with his PCP and cardiologist.     For the full HPI please refer to the History & Physical from this admission. For hospital course by problem, see below.     Hospital Course By Problem with Pertinent Findings     1. Volume Overload  Secondary to Acute on Chronic Combined Systolic and Diastolic Heart Failure with EF 15%  - Diuresed well on IV Lasix - was able to be weaned to Lasix 40mg daily on discharge with instructions to call cardiology and take his Lasix BID when he gained weight   - Continue Entresto     2. Asthma, controlled  - Well controlled on controller medications at home   - Refilled Symbicort on discharge     3. HTN  - Discontinued losartan 12.5mg daily due to starting entresto  - Increased Coreg on discharge to 6.25mg BID     Discharge Medications        Medication List      START taking these medications    atorvastatin 40 MG tablet  Commonly known as:  LIPITOR  Take 1 tablet (40 mg total) by mouth every evening.     sacubitril-valsartan 49-51 mg per tablet  Commonly known as:  ENTRESTO  Take 1 tablet by mouth 2 (two) times daily.        CHANGE how you take these medications    furosemide 40 MG tablet  Commonly known as:  LASIX  Take 1 tablet (40 mg total) by mouth once daily.  What changed:    · medication strength  · how much to take        CONTINUE taking these medications    * albuterol 0.63 mg/3 mL Nebu  Commonly known as:  ACCUNEB  Take 3 mLs (0.63 mg total) by nebulization every 6 (six) hours as needed.     * albuterol 90 mcg/actuation inhaler  Commonly known as:  ProAir HFA  Inhale 2 puffs into the lungs every 6 (six) hours as needed for Wheezing or Shortness of Breath (or cough).     aspirin 81 MG EC tablet  Commonly known as:  ECOTRIN  Take 1 tablet (81 mg total) by mouth once daily.     budesonide-formoterol 160-4.5 mcg 160-4.5 mcg/actuation Hfaa  Commonly known as:  Symbicort  Inhale 2 puffs into the lungs every 12 (twelve) hours.     carvediloL 6.25 MG tablet  Commonly known as:  COREG  Take 1 tablet (6.25 mg total) by mouth 2 (two) times daily.     fluticasone propionate 50 mcg/actuation nasal spray  Commonly known as:  FLONASE  spray 2 sprays (100 mcg total) by Each Nare route once daily.     sildenafil 50 MG  tablet  Commonly known as:  VIAGRA  Take 1 tablet (50 mg total) by mouth daily as needed for Erectile Dysfunction.         * This list has 2 medication(s) that are the same as other medications prescribed for you. Read the directions carefully, and ask your doctor or other care provider to review them with you.            STOP taking these medications    losartan 25 MG tablet  Commonly known as:  COZAAR           Where to Get Your Medications      You can get these medications from any pharmacy    Bring a paper prescription for each of these medications  · albuterol 0.63 mg/3 mL Nebu  · albuterol 90 mcg/actuation inhaler  · aspirin 81 MG EC tablet  · atorvastatin 40 MG tablet  · budesonide-formoterol 160-4.5 mcg 160-4.5 mcg/actuation Hfaa  · carvediloL 6.25 MG tablet  · furosemide 40 MG tablet  · sacubitril-valsartan 49-51 mg per tablet         Discharge Information:   Diet:  Cardiac diet, low sodium     Physical Activity:  As tolerated    Instructions:  1. Take all medications as prescribed  2. Keep all follow-up appointments  3. Return to the hospital or call your primary care physicians if any worsening symptoms such as worsening lower extremity edema, shortness of breath, chest pain, or headache occur.    Follow-Up Appointments:  [facility] - Dr. Santiago on 2/20/2020 @ 1:00pm  Cardiology - Dr. Estrada on 2/28/2020 @ 10:20am      Lorraine Piper MD  [facility] Hospitalist Team B  [facility] Med Peds HO-1  02/13/2020

## 2020-02-13 NOTE — PLAN OF CARE
Pt AAO x 4.  VSS in NAD  Pt remained afebrile throughout this shift with no hospital acquired illnesses  Pt remained free of falls this shift.   Pt moving/turing independently    Pt has no complaints of SOB at this time and is ready to transition home for self care with family support.

## 2020-02-17 ENCOUNTER — PATIENT OUTREACH (OUTPATIENT)
Dept: ADMINISTRATIVE | Facility: CLINIC | Age: 65
End: 2020-02-17

## 2020-02-17 NOTE — TELEPHONE ENCOUNTER
C3 nurse attempted to contact patient. The following occurred:   C3 nurse attempted to contact Dima Quintanilla for a TCC post hospital discharge follow up call. The patient is unable to conduct the call @ this time. The patient requested a callback.    The patient has a scheduled HOSFU appointment with Monika Santiago MD on 2/20/2020 AT 1:00

## 2020-02-17 NOTE — PATIENT INSTRUCTIONS
When Your Child Has Congestive Heart Failure (CHF)  Congestive heart failure (CHF) is a condition in which the heart does not pump as well as it should. When this happens, fluid can build up in the lungs or body tissues (congestion). CHF can cause lung problems, organ failure, and other serious problems in the body. CHF can usually be treated, but it is important to find out the underlying cause. Your childs healthcare provider will evaluate your childs heart and discuss treatment options with you.  What causes congestive heart failure?  CHF often develops in children with certain heart defects present at birth (congenital heart defects). These include defects such as holes in the heart, which cause an increased amount of blood flow from one side of the heart to the other. This changes the dynamics of blood flow and can cause one side of the heart to become weaker. The heart then is unable to support the blood flow resulting in worsening heart function. CHF can also be caused by other types of heart problems such as cardiomyopathy, a condition in which the hearts pumping function is impaired. Some non-heart problems, such as kidney failure, can lead to CHF due to changes in the body's fluid balance or hormone changes that lead to high blood pressure.    What are the symptoms of CHF?  Symptoms vary but may include:  · Swelling (edema) in the face, abdomen, ankles, or feet  · Shortness of breath, rapid breathing, wheezing, or excessive coughing  · Sweating  · Weakness or tiredness  · Poor feeding and weight gain (in infants)  · Racing heartbeat  · Wheezing  · Abdominal pain and nausea  In older children, symptoms may also include:  · Weight loss  · Passing out  · Chest pain  · Tiring easily during exercise  How is the cause of congestive heart failure diagnosed?  Heart problems in children are usually diagnosed and treated by a pediatric cardiologist. This is a doctor who specializes in diagnosing and treating  children's heart disease. The cardiologist will do a physical exam and ask about your childs health history. The following tests may be done to find the underlying cause of CHF:  · Chest X-ray. This test takes a picture of the heart and lungs. The picture can show your childs heart size and shape. This picture also shows the fluid status of your child's lungs, which can be a clue to the heart's function.  · Electrocardiogram (ECG or EKG). During this test, the electrical activity of the heart is recorded to check for heart rhythm problems (arrhythmias) or problems with heart structure.  · Echocardiogram (echo). During this test, sound waves (ultrasound) are used to create a picture of the heart. This test can show problems with heart structure or function. This includes showing how well the heart pumps, if the heart is enlarged, the direction and strength of blood flow, or if there are any valve problems.  · Lab tests. For these tests, blood and urine samples are taken to check for problems in the kidneys or other organs.  How is congestive heart failure treated?  Specific treatment for your child depends on the cause of CHF. If the cause of CHF in your child is a congenital heart defect, a catheter or surgical procedure may be done to repair the defect.  · Medicines are often prescribed to help manage your childs symptoms. These can include:  ¨ Diuretics help rid the body of excess water. This reduces fluid in the lungs and may improve breathing. These are very important in helping manage fluid status in heart failure.  ¨ Digoxin helps the heart pump blood with more force. This improves the hearts performance.  ¨ ACE inhibitors make blood vessels relax and allow blood to flow more easily from the heart.   ¨ Angiotensin receptor blockers or ARBs are very similar to ACE inhibitors. They may be used in a child who can't take an ACE inhibitor.  ¨ Beta-blockers lower blood pressure and slow heart rate by altering  hormones that can damage the heart. Beta-blockers can also improve the hearts pumping action over time.  · Pacemaker. Some children with heart failure need an artificial pacemaker. The pacemaker may help when the heart is not pumping well because of a slow heartbeat.  · Cardiac resynchronization therapy. This uses a special type of pacemaker that paces both pumping chambers of the heart at the same time to coordinate contractions and improve the heart's function. This treatment may be used in some children with long-term heart failure.  · Heart transplant. This is when the diseased heart is replaced with a healthy heart from a donor. This is only an option for very serious disease. And, it often takes some time to find a suitable heart. In some cases, a child may be able to be helped with devices that help the heart pump while he or she waits for a transplant.  Your child may benefit from seeing a nutritionist to help with nutrition for growth problems and to help balance fluids. He or she may also participate in an exercise rehab program to help with the ability to be active.  What are the long-term concerns?  The outcome for a child with CHF depends on many factors, including the underlying heart problem. The cardiologist will discuss your childs condition, treatment options, and potential outcomes with you.  Date Last Reviewed: 3/6/2016  © 3213-6915 [employer]. [address] [place] [address], [place] [address]. All rights reserved. This information is not intended as a substitute for professional medical care. Always follow your healthcare professional's instructions.

## 2020-02-20 ENCOUNTER — OFFICE VISIT (OUTPATIENT)
Dept: PRIMARY CARE CLINIC | Facility: CLINIC | Age: 65
End: 2020-02-20
Payer: MEDICARE

## 2020-02-20 ENCOUNTER — LAB VISIT (OUTPATIENT)
Dept: LAB | Facility: HOSPITAL | Age: 65
End: 2020-02-20
Attending: NURSE PRACTITIONER
Payer: MEDICARE

## 2020-02-20 VITALS
TEMPERATURE: 97 F | HEIGHT: 66 IN | DIASTOLIC BLOOD PRESSURE: 71 MMHG | BODY MASS INDEX: 23.1 KG/M2 | OXYGEN SATURATION: 98 % | HEART RATE: 69 BPM | SYSTOLIC BLOOD PRESSURE: 118 MMHG | WEIGHT: 143.75 LBS

## 2020-02-20 DIAGNOSIS — Z95.810 PRESENCE OF CARDIAC RESYNCHRONIZATION THERAPY DEFIBRILLATOR (CRT-D): ICD-10-CM

## 2020-02-20 DIAGNOSIS — I50.43 ACUTE ON CHRONIC COMBINED SYSTOLIC AND DIASTOLIC CONGESTIVE HEART FAILURE: ICD-10-CM

## 2020-02-20 DIAGNOSIS — I50.43 ACUTE ON CHRONIC COMBINED SYSTOLIC AND DIASTOLIC CONGESTIVE HEART FAILURE: Primary | ICD-10-CM

## 2020-02-20 DIAGNOSIS — I42.8 NICM (NONISCHEMIC CARDIOMYOPATHY): ICD-10-CM

## 2020-02-20 DIAGNOSIS — I10 ESSENTIAL HYPERTENSION: ICD-10-CM

## 2020-02-20 LAB
ANION GAP SERPL CALC-SCNC: 10 MMOL/L (ref 8–16)
BNP SERPL-MCNC: 351 PG/ML (ref 0–99)
BUN SERPL-MCNC: 17 MG/DL (ref 8–23)
CALCIUM SERPL-MCNC: 9.2 MG/DL (ref 8.7–10.5)
CHLORIDE SERPL-SCNC: 104 MMOL/L (ref 95–110)
CO2 SERPL-SCNC: 28 MMOL/L (ref 23–29)
CREAT SERPL-MCNC: 1.4 MG/DL (ref 0.5–1.4)
EST. GFR  (AFRICAN AMERICAN): >60 ML/MIN/1.73 M^2
EST. GFR  (NON AFRICAN AMERICAN): 53 ML/MIN/1.73 M^2
GLUCOSE SERPL-MCNC: 84 MG/DL (ref 70–110)
POTASSIUM SERPL-SCNC: 4.4 MMOL/L (ref 3.5–5.1)
SODIUM SERPL-SCNC: 142 MMOL/L (ref 136–145)

## 2020-02-20 PROCEDURE — 99215 OFFICE O/P EST HI 40 MIN: CPT | Mod: S$PBB,,, | Performed by: INTERNAL MEDICINE

## 2020-02-20 PROCEDURE — 80048 BASIC METABOLIC PNL TOTAL CA: CPT

## 2020-02-20 PROCEDURE — 36415 COLL VENOUS BLD VENIPUNCTURE: CPT

## 2020-02-20 PROCEDURE — 99999 PR PBB SHADOW E&M-EST. PATIENT-LVL III: ICD-10-PCS | Mod: PBBFAC,,, | Performed by: INTERNAL MEDICINE

## 2020-02-20 PROCEDURE — 83880 ASSAY OF NATRIURETIC PEPTIDE: CPT

## 2020-02-20 PROCEDURE — 99215 PR OFFICE/OUTPT VISIT, EST, LEVL V, 40-54 MIN: ICD-10-PCS | Mod: S$PBB,,, | Performed by: INTERNAL MEDICINE

## 2020-02-20 PROCEDURE — 99999 PR PBB SHADOW E&M-EST. PATIENT-LVL III: CPT | Mod: PBBFAC,,, | Performed by: INTERNAL MEDICINE

## 2020-02-20 PROCEDURE — 99213 OFFICE O/P EST LOW 20 MIN: CPT | Mod: PBBFAC,PO | Performed by: INTERNAL MEDICINE

## 2020-02-20 RX ORDER — CARVEDILOL 3.12 MG/1
TABLET ORAL
COMMUNITY
Start: 2020-02-13 | End: 2020-02-20 | Stop reason: SDUPTHER

## 2020-02-20 NOTE — PROGRESS NOTES
[facility]   New Visit Progress Note   Recent Hospital Discharge     PRESENTING HISTORY     Chief Complaint/Reason for Admission:  Follow up Hospital Discharge   PCP: Sweetie Riojas MD    History of Present Illness:  Mr. Dima Quintanilla is a 64 y.o. male who was recently admitted to the hospital.    [facility] Internal Med Team B Discharge Summary     Primary Team: [facility] Internal Med Team B  Attending Physician: Jordin Kincaid MD  Resident: Jacek  Intern: Phyllis  Date of Admit: 2/11/2020  Date of Discharge: 2/13/2020  Discharge to: home  Condition: stable  ___________________________________________________________________    Today:    [facility] up.  Recently hospitalized for decompensated heart failure.   He responded well to diuresis and supportive care.  Medications adjusted and Entresto initiated.  Patient discharged to home.     Patient unaccompanied today.  Ambulatory and independent with ADL's.  Reports compliance with all medication, though multiple medication discrepancies noted today.   Patient has all medication bottles with him and they were thoroughly reviewed.    Duplicate, outdated, and discontinued medications were disposed of.   Weight stable since hospital discharge ( Discharge weight 142 lbs.).  Education and counseling provided on sodium restricted diet.     Review of Systems  General ROS: negative for chills, fever or weight loss  Psychological ROS: negative for hallucination, depression or suicidal ideation  Ophthalmic ROS: negative for blurry vision, photophobia or eye pain  ENT ROS: negative for epistaxis, sore throat or rhinorrhea  Respiratory ROS: no cough, shortness of breath, or wheezing  Cardiovascular ROS: no chest pain or dyspnea on exertion  Gastrointestinal ROS: no abdominal pain, change in bowel habits, or black/ bloody stools  Genito-Urinary ROS: no dysuria, trouble voiding, or hematuria  + erectile dysfunction    Musculoskeletal ROS: negative for gait disturbance or muscular weakness  Neurological ROS: no syncope or seizures; no ataxia  Dermatological ROS: negative for pruritis, rash and jaundice      PAST HISTORY:     Past Medical History:   Diagnosis Date    Asthma     COPD (chronic obstructive pulmonary disease)        Past Surgical History:   Procedure Laterality Date    CORONARY ANGIOGRAPHY N/A 5/28/2019    Procedure: ANGIOGRAM, CORONARY ARTERY;  Surgeon: Nikunj Pabon MD;  Location: [facility] CATH LAB/EP;  Service: Cardiology;  Laterality: N/A;    INSERTION OF BIVENTRICULAR IMPLANTABLE CARDIOVERTER-DEFIBRILLATOR (ICD) N/A 9/25/2019    Procedure: INSERTION, ICD, BIVENTRICULAR;  Surgeon: Yaniv Townsend MD;  Location[facility] EP LAB;  Service: Cardiology;  Laterality: N/A;  HF, CRT-D, SJM, MAC, SC, 3 Prep    LEFT HEART CATHETERIZATION N/A 5/28/2019    Procedure: Left heart cath;  Surgeon: Nikunj Pabon MD;  Location: [facility] CATH LAB/EP;  Service: Cardiology;  Laterality: N/A;       No family history on file.      MEDICATIONS & ALLERGIES:     Current Outpatient Medications on File Prior to Visit   Medication Sig Dispense Refill    albuterol (ACCUNEB) 0.63 mg/3 mL Nebu Take 3 mLs (0.63 mg total) by nebulization every 6 (six) hours as needed. 1 vial 5    albuterol (PROAIR HFA) 90 mcg/actuation inhaler Inhale 2 puffs into the lungs every 6 (six) hours as needed for Wheezing or Shortness of Breath (or cough). 18 g 11    aspirin (ECOTRIN) 81 MG EC tablet Take 1 tablet (81 mg total) by mouth once daily. 90 tablet 3    atorvastatin (LIPITOR) 40 MG tablet Take 1 tablet (40 mg total) by mouth every evening. 90 tablet 3    budesonide-formoterol 160-4.5 mcg (SYMBICORT) 160-4.5 mcg/actuation HFAA Inhale 2 puffs into the lungs every 12 (twelve) hours. 3 Inhaler 4    carvediloL (COREG) 6.25 MG tablet Take 1 tablet (6.25 mg total) by mouth 2 (two) times daily. 60 tablet 11    fluticasone propionate (FLONASE) 50 mcg/actuation  "nasal spray spray 2 sprays (100 mcg total) by Each Nare route once daily. 16 g 0    furosemide (LASIX) 40 MG tablet Take 1 tablet (40 mg total) by mouth once daily. 30 tablet 11    sacubitril-valsartan (ENTRESTO) 49-51 mg per tablet Take 1 tablet by mouth 2 (two) times daily. 60 tablet 1    sildenafil (VIAGRA) 50 MG tablet Take 1 tablet (50 mg total) by mouth daily as needed for Erectile Dysfunction. (Patient not taking: Reported on 2/17/2020) 10 tablet 0     No current facility-administered medications on file prior to visit.         Review of patient's allergies indicates:  No Known Allergies    OBJECTIVE:     Vital Signs:  /71 (BP Location: Right arm, Patient Position: Sitting, BP Method: Large (Automatic))   Pulse 69   Temp 96.5 °F (35.8 °C) (Oral)   Ht 5' 6" (1.676 m)   Wt 65.2 kg (143 lb 11.8 oz)   SpO2 98%   BMI 23.20 kg/m²   Wt Readings from Last 1 Encounters:   02/13/20 0417 64.5 kg (142 lb 3.2 oz)   02/12/20 0555 66.2 kg (145 lb 15.1 oz)   02/11/20 1810 67.6 kg (149 lb)   02/11/20 1420 67.6 kg (149 lb)   02/11/20 1125 67.6 kg (149 lb)     Body mass index is 23.2 kg/m².        Physical Exam:  /71 (BP Location: Right arm, Patient Position: Sitting, BP Method: Large (Automatic))   Pulse 69   Temp 96.5 °F (35.8 °C) (Oral)   Ht 5' 6" (1.676 m)   Wt 65.2 kg (143 lb 11.8 oz)   SpO2 98%   BMI 23.20 kg/m²   General appearance: alert, cooperative, no distress  Constitutional:Oriented to person, place, and time  + appears well-developed and well-nourished.   HEENT: Normocephalic, atraumatic, neck symmetrical, no nasal discharge   Eyes: conjunctivae/corneas clear, PERRL, EOM's intact  Lungs: clear to auscultation bilaterally, no dullness to percussion bilaterally  Heart: regular rate and rhythm without rub; no displacement of the PMI   Abdomen: soft, non-tender; bowel sounds normoactive; no organomegaly  Extremities: extremities symmetric; no clubbing, cyanosis, or edema  Integument: Skin " color, texture, turgor normal; no rashes; hair distrubution normal  Neurologic: Alert and oriented X 3, normal strength, normal coordination and gait  Psychiatric: no pressured speech; normal affect; no evidence of impaired cognition     Laboratory  Lab Results   Component Value Date    WBC 6.18 02/13/2020    HGB 16.7 02/13/2020    HCT 51.2 02/13/2020    MCV 80 (L) 02/13/2020     02/13/2020     BMP  Lab Results   Component Value Date     02/13/2020    K 3.8 02/13/2020    CL 97 02/13/2020    CO2 33 (H) 02/13/2020    BUN 18 02/13/2020    CREATININE 1.4 02/13/2020    CALCIUM 9.0 02/13/2020    ANIONGAP 9 02/13/2020    ESTGFRAFRICA >60 02/13/2020    EGFRNONAA 53 (A) 02/13/2020     Lab Results   Component Value Date    ALT 17 02/13/2020    AST 18 02/13/2020    ALKPHOS 90 02/13/2020    BILITOT 0.9 02/13/2020     Lab Results   Component Value Date    INR 1.0 09/25/2019    INR 1.0 08/08/2019    INR 1.0 05/28/2019     Lab Results   Component Value Date    HGBA1C 6.3 (H) 02/12/2020       Diagnostic Results:  2 D Echo 8/27/20:  · Severely decreased left ventricular systolic function. The estimated ejection fraction is 15%  · Mildly reduced right ventricular systolic function.  · Severe left atrial enlargement.  · Moderate right atrial enlargement.  · Intermediate central venous pressure (8 mm Hg).      ASSESSMENT & PLAN:     Acute on chronic combined systolic and diastolic congestive heart failure  NICM (nonischemic cardiomyopathy)  - recent hospitalization for cardiac decompensation  - appears euvolemic presently  - on appropriate medical therapy; Entresto recently initiated  - had labs drawn by cardiology team today- results pending  - has cardiology follow up with Dr Estrada 2/28/20    Presence of cardiac resynchronization therapy defibrillator (CRT-D)    Essential hypertension  - at goal presently     I will [facility]20.   Instructions for the patient:      Scheduled Follow-up  :  Future Appointments   Date Time Provider Department Center   2/28/2020 10:20 AM Chelly Estrada MD [facility] CARDIO [facility]   3/2/2020 11:30 AM Monika Santiago MD [facility] IMPRI [facility]   3/28/2020 10:00 AM HOME MONITOR DEVICE CHECK, [facility]       Post Visit Medication List:     Medication List           Accurate as of February 20, 2020  3:34 PM. If you have any questions, ask your nurse or doctor.               CONTINUE taking these medications    * albuterol 0.63 mg/3 mL Nebu  Commonly known as:  ACCUNEB  Take 3 mLs (0.63 mg total) by nebulization every 6 (six) hours as needed.     * albuterol 90 mcg/actuation inhaler  Commonly known as:  ProAir HFA  Inhale 2 puffs into the lungs every 6 (six) hours as needed for Wheezing or Shortness of Breath (or cough).     aspirin 81 MG EC tablet  Commonly known as:  ECOTRIN  Take 1 tablet (81 mg total) by mouth once daily.     atorvastatin 40 MG tablet  Commonly known as:  LIPITOR  Take 1 tablet (40 mg total) by mouth every evening.     budesonide-formoterol 160-4.5 mcg 160-4.5 mcg/actuation Hfaa  Commonly known as:  Symbicort  Inhale 2 puffs into the lungs every 12 (twelve) hours.     carvediloL 6.25 MG tablet  Commonly known as:  COREG  Take 1 tablet (6.25 mg total) by mouth 2 (two) times daily.     fluticasone propionate 50 mcg/actuation nasal spray  Commonly known as:  FLONASE  spray 2 sprays (100 mcg total) by Each Nare route once daily.     furosemide 40 MG tablet  Commonly known as:  LASIX  Take 1 tablet (40 mg total) by mouth once daily.     sacubitril-valsartan 49-51 mg per tablet  Commonly known as:  ENTRESTO  Take 1 tablet by mouth 2 (two) times daily.     sildenafil 50 MG tablet  Commonly known as:  VIAGRA  Take 1 tablet (50 mg total) by mouth daily as needed for Erectile Dysfunction.         * This list has 2 medication(s) that are the same as other medications prescribed for you. Read the directions carefully, and ask your doctor or  other care provider to review them with you.                Signing Physician:  Monika Santiago MD

## 2020-03-04 ENCOUNTER — TELEPHONE (OUTPATIENT)
Dept: PRIMARY CARE CLINIC | Facility: CLINIC | Age: 65
End: 2020-03-04

## 2020-03-04 NOTE — TELEPHONE ENCOUNTER
Left message for patient to return call to reschedule [facility] Monday with Dr. Santiago.

## 2020-03-28 ENCOUNTER — CLINICAL SUPPORT (OUTPATIENT)
Dept: CARDIOLOGY | Facility: HOSPITAL | Age: 65
End: 2020-03-28
Payer: MEDICARE

## 2020-03-28 PROCEDURE — 93296 REM INTERROG EVL PM/IDS: CPT | Performed by: INTERNAL MEDICINE

## 2020-03-28 PROCEDURE — 93295 CARDIAC DEVICE CHECK - REMOTE: ICD-10-PCS | Mod: ,,, | Performed by: INTERNAL MEDICINE

## 2020-03-28 PROCEDURE — 93295 DEV INTERROG REMOTE 1/2/MLT: CPT | Mod: ,,, | Performed by: INTERNAL MEDICINE

## 2020-06-26 ENCOUNTER — CLINICAL SUPPORT (OUTPATIENT)
Dept: CARDIOLOGY | Facility: HOSPITAL | Age: 65
End: 2020-06-26
Payer: MEDICARE

## 2020-06-26 PROCEDURE — 93296 REM INTERROG EVL PM/IDS: CPT | Performed by: INTERNAL MEDICINE

## 2020-06-26 PROCEDURE — 93295 CARDIAC DEVICE CHECK - REMOTE: ICD-10-PCS | Mod: ,,, | Performed by: INTERNAL MEDICINE

## 2020-06-26 PROCEDURE — 93295 DEV INTERROG REMOTE 1/2/MLT: CPT | Mod: ,,, | Performed by: INTERNAL MEDICINE

## 2020-08-05 ENCOUNTER — TELEPHONE (OUTPATIENT)
Dept: CARDIOLOGY | Facility: HOSPITAL | Age: 65
End: 2020-08-05

## 2020-08-05 NOTE — TELEPHONE ENCOUNTER
"Contacted the pt on this am in relation to scheduled remote ICD transmission received on 8/3/20 shows "Magnet Response Recorded" for ~ 1 hr on 6/25/20.  Pt stated that he bought a "Star Trek" magnetic pin and had it on his shirt just over his ICD implant site.  Pt instructed to not use and/or place any type of magnet as to this could possibly prevent the ICD from delivering any therapies should he have an arrhythmia.  Pt verbalized understanding and stated, "my wife saw it and told me that I can't wear stuff like that so I took it off".  Pt appreciated the call.   "

## 2020-09-24 ENCOUNTER — CLINICAL SUPPORT (OUTPATIENT)
Dept: CARDIOLOGY | Facility: HOSPITAL | Age: 65
End: 2020-09-24
Payer: MEDICARE

## 2020-09-24 DIAGNOSIS — Z95.810 PRESENCE OF AUTOMATIC (IMPLANTABLE) CARDIAC DEFIBRILLATOR: ICD-10-CM

## 2020-09-24 PROCEDURE — 93296 REM INTERROG EVL PM/IDS: CPT | Performed by: INTERNAL MEDICINE

## 2020-09-24 PROCEDURE — 93295 DEV INTERROG REMOTE 1/2/MLT: CPT | Mod: ,,, | Performed by: INTERNAL MEDICINE

## 2020-09-24 PROCEDURE — 93295 CARDIAC DEVICE CHECK - REMOTE: ICD-10-PCS | Mod: ,,, | Performed by: INTERNAL MEDICINE

## 2020-12-23 ENCOUNTER — CLINICAL SUPPORT (OUTPATIENT)
Dept: CARDIOLOGY | Facility: HOSPITAL | Age: 65
End: 2020-12-23
Payer: MEDICARE

## 2020-12-23 DIAGNOSIS — Z95.810 PRESENCE OF AUTOMATIC (IMPLANTABLE) CARDIAC DEFIBRILLATOR: ICD-10-CM

## 2020-12-23 DIAGNOSIS — I42.9 CARDIOMYOPATHY, UNSPECIFIED: ICD-10-CM

## 2020-12-23 PROCEDURE — 93295 DEV INTERROG REMOTE 1/2/MLT: CPT | Mod: ,,, | Performed by: INTERNAL MEDICINE

## 2020-12-23 PROCEDURE — 93295 CARDIAC DEVICE CHECK - REMOTE: ICD-10-PCS | Mod: ,,, | Performed by: INTERNAL MEDICINE

## 2021-02-04 RX ORDER — ATORVASTATIN CALCIUM 40 MG/1
TABLET, FILM COATED ORAL
Qty: 90 TABLET | Refills: 3 | Status: SHIPPED | OUTPATIENT
Start: 2021-02-04

## 2021-02-10 ENCOUNTER — TELEPHONE (OUTPATIENT)
Dept: ELECTROPHYSIOLOGY | Facility: CLINIC | Age: 66
End: 2021-02-10

## 2021-02-10 DIAGNOSIS — I49.8 OTHER SPECIFIED CARDIAC ARRHYTHMIAS: Primary | ICD-10-CM

## 2021-03-19 ENCOUNTER — OUTSIDE PLACE OF SERVICE (OUTPATIENT)
Dept: CARDIOLOGY | Facility: CLINIC | Age: 66
End: 2021-03-19
Payer: MEDICARE

## 2021-03-19 PROCEDURE — 93010 ELECTROCARDIOGRAM REPORT: CPT | Mod: ,,, | Performed by: INTERNAL MEDICINE

## 2021-03-19 PROCEDURE — 93010 PR ELECTROCARDIOGRAM REPORT: ICD-10-PCS | Mod: ,,, | Performed by: INTERNAL MEDICINE

## 2021-03-23 ENCOUNTER — CLINICAL SUPPORT (OUTPATIENT)
Dept: CARDIOLOGY | Facility: HOSPITAL | Age: 66
End: 2021-03-23
Payer: MEDICARE

## 2021-03-23 DIAGNOSIS — Z95.810 PRESENCE OF AUTOMATIC (IMPLANTABLE) CARDIAC DEFIBRILLATOR: ICD-10-CM

## 2021-03-23 PROCEDURE — 93295 CARDIAC DEVICE CHECK - REMOTE: ICD-10-PCS | Mod: ,,, | Performed by: INTERNAL MEDICINE

## 2021-03-23 PROCEDURE — 93295 DEV INTERROG REMOTE 1/2/MLT: CPT | Mod: ,,, | Performed by: INTERNAL MEDICINE

## 2021-03-24 ENCOUNTER — TELEPHONE (OUTPATIENT)
Dept: ELECTROPHYSIOLOGY | Facility: CLINIC | Age: 66
End: 2021-03-24

## 2021-03-25 ENCOUNTER — HOSPITAL ENCOUNTER (OUTPATIENT)
Dept: CARDIOLOGY | Facility: CLINIC | Age: 66
Discharge: HOME | End: 2021-03-25
Payer: MEDICARE

## 2021-03-25 ENCOUNTER — CLINICAL SUPPORT (OUTPATIENT)
Dept: CARDIOLOGY | Facility: HOSPITAL | Age: 66
End: 2021-03-25
Attending: INTERNAL MEDICINE
Payer: MEDICARE

## 2021-03-25 ENCOUNTER — OFFICE VISIT (OUTPATIENT)
Dept: ELECTROPHYSIOLOGY | Facility: CLINIC | Age: 66
End: 2021-03-25
Payer: MEDICARE

## 2021-03-25 VITALS
SYSTOLIC BLOOD PRESSURE: 170 MMHG | DIASTOLIC BLOOD PRESSURE: 83 MMHG | HEART RATE: 65 BPM | HEIGHT: 66 IN | WEIGHT: 149.06 LBS | BODY MASS INDEX: 23.95 KG/M2

## 2021-03-25 DIAGNOSIS — I49.8 OTHER SPECIFIED CARDIAC ARRHYTHMIAS: ICD-10-CM

## 2021-03-25 DIAGNOSIS — I42.8 NICM (NONISCHEMIC CARDIOMYOPATHY): ICD-10-CM

## 2021-03-25 DIAGNOSIS — I10 ESSENTIAL HYPERTENSION: ICD-10-CM

## 2021-03-25 DIAGNOSIS — I44.7 LBBB (LEFT BUNDLE BRANCH BLOCK): Primary | Chronic | ICD-10-CM

## 2021-03-25 DIAGNOSIS — Z95.810 PRESENCE OF CARDIAC RESYNCHRONIZATION THERAPY DEFIBRILLATOR (CRT-D): ICD-10-CM

## 2021-03-25 PROCEDURE — 93284 PRGRMG EVAL IMPLANTABLE DFB: CPT | Mod: 26,,, | Performed by: INTERNAL MEDICINE

## 2021-03-25 PROCEDURE — 93005 ELECTROCARDIOGRAM TRACING: CPT | Mod: PBBFAC | Performed by: INTERNAL MEDICINE

## 2021-03-25 PROCEDURE — 99999 PR PBB SHADOW E&M-EST. PATIENT-LVL III: CPT | Mod: PBBFAC,,, | Performed by: NURSE PRACTITIONER

## 2021-03-25 PROCEDURE — 99214 PR OFFICE/OUTPT VISIT, EST, LEVL IV, 30-39 MIN: ICD-10-PCS | Mod: S$PBB,,, | Performed by: NURSE PRACTITIONER

## 2021-03-25 PROCEDURE — 93010 RHYTHM STRIP: ICD-10-PCS | Mod: S$PBB,,, | Performed by: INTERNAL MEDICINE

## 2021-03-25 PROCEDURE — 93284 CARDIAC DEVICE CHECK - IN CLINIC & HOSPITAL: ICD-10-PCS | Mod: 26,,, | Performed by: INTERNAL MEDICINE

## 2021-03-25 PROCEDURE — 99213 OFFICE O/P EST LOW 20 MIN: CPT | Mod: PBBFAC,25 | Performed by: NURSE PRACTITIONER

## 2021-03-25 PROCEDURE — 99999 PR PBB SHADOW E&M-EST. PATIENT-LVL III: ICD-10-PCS | Mod: PBBFAC,,, | Performed by: NURSE PRACTITIONER

## 2021-03-25 PROCEDURE — 93010 ELECTROCARDIOGRAM REPORT: CPT | Mod: S$PBB,,, | Performed by: INTERNAL MEDICINE

## 2021-03-25 PROCEDURE — 99214 OFFICE O/P EST MOD 30 MIN: CPT | Mod: S$PBB,,, | Performed by: NURSE PRACTITIONER

## 2021-03-25 PROCEDURE — 93284 PRGRMG EVAL IMPLANTABLE DFB: CPT

## 2021-03-26 ENCOUNTER — HOSPITAL ENCOUNTER (OUTPATIENT)
Dept: CARDIOLOGY | Facility: HOSPITAL | Age: 66
Discharge: HOME | End: 2021-03-26
Attending: NURSE PRACTITIONER
Payer: MEDICARE

## 2021-03-26 ENCOUNTER — TELEPHONE (OUTPATIENT)
Dept: ELECTROPHYSIOLOGY | Facility: CLINIC | Age: 66
End: 2021-03-26

## 2021-03-26 VITALS
WEIGHT: 149 LBS | HEART RATE: 67 BPM | BODY MASS INDEX: 23.95 KG/M2 | DIASTOLIC BLOOD PRESSURE: 78 MMHG | HEIGHT: 66 IN | SYSTOLIC BLOOD PRESSURE: 132 MMHG

## 2021-03-26 DIAGNOSIS — I42.8 NICM (NONISCHEMIC CARDIOMYOPATHY): ICD-10-CM

## 2021-03-26 DIAGNOSIS — I42.8 NICM (NONISCHEMIC CARDIOMYOPATHY): Primary | ICD-10-CM

## 2021-03-26 LAB
ASCENDING AORTA: 2.93 CM
AV INDEX (PROSTH): 0.7
AV MEAN GRADIENT: 2 MMHG
AV PEAK GRADIENT: 4 MMHG
AV VALVE AREA: 2.34 CM2
AV VELOCITY RATIO: 0.83
BSA FOR ECHO PROCEDURE: 1.77 M2
CV ECHO LV RWT: 0.3 CM
DOP CALC AO PEAK VEL: 1.03 M/S
DOP CALC AO VTI: 19.54 CM
DOP CALC LVOT AREA: 3.3 CM2
DOP CALC LVOT DIAMETER: 2.06 CM
DOP CALC LVOT PEAK VEL: 0.85 M/S
DOP CALC LVOT STROKE VOLUME: 45.7 CM3
DOP CALCLVOT PEAK VEL VTI: 13.72 CM
E WAVE DECELERATION TIME: 220.07 MSEC
E/A RATIO: 0.81
E/E' RATIO: 9 M/S
ECHO LV POSTERIOR WALL: 0.94 CM (ref 0.6–1.1)
FRACTIONAL SHORTENING: 12 % (ref 28–44)
INTERVENTRICULAR SEPTUM: 0.88 CM (ref 0.6–1.1)
LA MAJOR: 3.76 CM
LA MINOR: 4.13 CM
LA WIDTH: 3.22 CM
LEFT ATRIUM SIZE: 3.79 CM
LEFT ATRIUM VOLUME INDEX MOD: 15.4 ML/M2
LEFT ATRIUM VOLUME INDEX: 23.2 ML/M2
LEFT ATRIUM VOLUME MOD: 27.06 CM3
LEFT ATRIUM VOLUME: 40.83 CM3
LEFT INTERNAL DIMENSION IN SYSTOLE: 5.49 CM (ref 2.1–4)
LEFT VENTRICLE DIASTOLIC VOLUME INDEX: 110.77 ML/M2
LEFT VENTRICLE DIASTOLIC VOLUME: 194.96 ML
LEFT VENTRICLE MASS INDEX: 132 G/M2
LEFT VENTRICLE SYSTOLIC VOLUME INDEX: 83.3 ML/M2
LEFT VENTRICLE SYSTOLIC VOLUME: 146.61 ML
LEFT VENTRICULAR INTERNAL DIMENSION IN DIASTOLE: 6.21 CM (ref 3.5–6)
LEFT VENTRICULAR MASS: 232.14 G
LV LATERAL E/E' RATIO: 6 M/S
LV SEPTAL E/E' RATIO: 18 M/S
MV A" WAVE DURATION": 9.42 MSEC
MV MEAN GRADIENT: 0 MMHG
MV PEAK A VEL: 0.67 M/S
MV PEAK E VEL: 0.54 M/S
MV PEAK GRADIENT: 4 MMHG
MV STENOSIS PRESSURE HALF TIME: 63.82 MS
MV VALVE AREA P 1/2 METHOD: 3.45 CM2
PISA MRMAX VEL: 0.04 M/S
PISA RADIUS: 1.16 CM
PISA TR VN NYQUIST: 0 M/S
PULM VEIN S/D RATIO: 1.24
PV PEAK D VEL: 0.38 M/S
PV PEAK S VEL: 0.47 M/S
RA MAJOR: 3.06 CM
RA PRESSURE: 3 MMHG
RA WIDTH: 2.42 CM
RETIRED EF AND QEF - SEE NOTES: 23 %
RIGHT VENTRICULAR END-DIASTOLIC DIMENSION: 2.87 CM
RV TISSUE DOPPLER FREE WALL SYSTOLIC VELOCITY 1 (APICAL 4 CHAMBER VIEW): 14.69 CM/S
SINUS: 2.89 CM
STJ: 2.98 CM
TDI LATERAL: 0.09 M/S
TDI SEPTAL: 0.03 M/S
TDI: 0.06 M/S
TRICUSPID ANNULAR PLANE SYSTOLIC EXCURSION: 1.78 CM

## 2021-03-26 PROCEDURE — 93306 TTE W/DOPPLER COMPLETE: CPT | Mod: 26,,, | Performed by: INTERNAL MEDICINE

## 2021-03-26 PROCEDURE — 93306 TTE W/DOPPLER COMPLETE: CPT

## 2021-03-26 PROCEDURE — 93306 ECHO (CUPID ONLY): ICD-10-PCS | Mod: 26,,, | Performed by: INTERNAL MEDICINE

## 2021-04-14 ENCOUNTER — OFFICE VISIT (OUTPATIENT)
Dept: INTERNAL MEDICINE | Facility: CLINIC | Age: 66
End: 2021-04-14
Payer: MEDICARE

## 2021-04-14 ENCOUNTER — LAB VISIT (OUTPATIENT)
Dept: LAB | Facility: HOSPITAL | Age: 66
End: 2021-04-14
Attending: FAMILY MEDICINE
Payer: MEDICARE

## 2021-04-14 VITALS
DIASTOLIC BLOOD PRESSURE: 80 MMHG | HEART RATE: 65 BPM | BODY MASS INDEX: 23.74 KG/M2 | HEIGHT: 66 IN | WEIGHT: 147.69 LBS | OXYGEN SATURATION: 97 % | SYSTOLIC BLOOD PRESSURE: 148 MMHG

## 2021-04-14 DIAGNOSIS — Z12.5 PROSTATE CANCER SCREENING: ICD-10-CM

## 2021-04-14 DIAGNOSIS — Z79.899 ENCOUNTER FOR LONG-TERM (CURRENT) USE OF OTHER MEDICATIONS: ICD-10-CM

## 2021-04-14 DIAGNOSIS — I10 ESSENTIAL HYPERTENSION: ICD-10-CM

## 2021-04-14 DIAGNOSIS — Z95.810 PRESENCE OF CARDIAC RESYNCHRONIZATION THERAPY DEFIBRILLATOR (CRT-D): ICD-10-CM

## 2021-04-14 DIAGNOSIS — Z12.11 ENCOUNTER FOR COLORECTAL CANCER SCREENING: ICD-10-CM

## 2021-04-14 DIAGNOSIS — Z11.59 NEED FOR HEPATITIS C SCREENING TEST: ICD-10-CM

## 2021-04-14 DIAGNOSIS — Z12.12 ENCOUNTER FOR COLORECTAL CANCER SCREENING: ICD-10-CM

## 2021-04-14 DIAGNOSIS — Z00.00 ANNUAL PHYSICAL EXAM: Primary | ICD-10-CM

## 2021-04-14 DIAGNOSIS — Z00.00 ANNUAL PHYSICAL EXAM: ICD-10-CM

## 2021-04-14 DIAGNOSIS — R73.03 PREDIABETES: ICD-10-CM

## 2021-04-14 DIAGNOSIS — J45.40 MODERATE PERSISTENT ASTHMA WITHOUT COMPLICATION: ICD-10-CM

## 2021-04-14 DIAGNOSIS — I50.42 CHRONIC COMBINED SYSTOLIC AND DIASTOLIC CONGESTIVE HEART FAILURE: ICD-10-CM

## 2021-04-14 DIAGNOSIS — Z76.89 ENCOUNTER TO ESTABLISH CARE WITH NEW DOCTOR: ICD-10-CM

## 2021-04-14 PROBLEM — J45.909 ASTHMA: Status: ACTIVE | Noted: 2021-04-14

## 2021-04-14 LAB
ALBUMIN SERPL BCP-MCNC: 3.8 G/DL (ref 3.5–5.2)
ALP SERPL-CCNC: 66 U/L (ref 55–135)
ALT SERPL W/O P-5'-P-CCNC: 20 U/L (ref 10–44)
ANION GAP SERPL CALC-SCNC: 5 MMOL/L (ref 8–16)
AST SERPL-CCNC: 20 U/L (ref 10–40)
BILIRUB SERPL-MCNC: 0.6 MG/DL (ref 0.1–1)
BUN SERPL-MCNC: 15 MG/DL (ref 8–23)
CALCIUM SERPL-MCNC: 9.1 MG/DL (ref 8.7–10.5)
CHLORIDE SERPL-SCNC: 104 MMOL/L (ref 95–110)
CHOLEST SERPL-MCNC: 191 MG/DL (ref 120–199)
CHOLEST/HDLC SERPL: 2.1 {RATIO} (ref 2–5)
CO2 SERPL-SCNC: 33 MMOL/L (ref 23–29)
COMPLEXED PSA SERPL-MCNC: 4 NG/ML (ref 0–4)
CREAT SERPL-MCNC: 1.2 MG/DL (ref 0.5–1.4)
ERYTHROCYTE [DISTWIDTH] IN BLOOD BY AUTOMATED COUNT: 15.4 % (ref 11.5–14.5)
EST. GFR  (AFRICAN AMERICAN): >60 ML/MIN/1.73 M^2
EST. GFR  (NON AFRICAN AMERICAN): >60 ML/MIN/1.73 M^2
GLUCOSE SERPL-MCNC: 91 MG/DL (ref 70–110)
HCT VFR BLD AUTO: 45.3 % (ref 40–54)
HDLC SERPL-MCNC: 91 MG/DL (ref 40–75)
HDLC SERPL: 47.6 % (ref 20–50)
HGB BLD-MCNC: 14 G/DL (ref 14–18)
LDLC SERPL CALC-MCNC: 90 MG/DL (ref 63–159)
MCH RBC QN AUTO: 27.6 PG (ref 27–31)
MCHC RBC AUTO-ENTMCNC: 30.9 G/DL (ref 32–36)
MCV RBC AUTO: 89 FL (ref 82–98)
NONHDLC SERPL-MCNC: 100 MG/DL
PLATELET # BLD AUTO: 141 K/UL (ref 150–450)
PMV BLD AUTO: 13.5 FL (ref 9.2–12.9)
POTASSIUM SERPL-SCNC: 4.4 MMOL/L (ref 3.5–5.1)
PROT SERPL-MCNC: 6.8 G/DL (ref 6–8.4)
RBC # BLD AUTO: 5.08 M/UL (ref 4.6–6.2)
SODIUM SERPL-SCNC: 142 MMOL/L (ref 136–145)
TRIGL SERPL-MCNC: 50 MG/DL (ref 30–150)
WBC # BLD AUTO: 9.06 K/UL (ref 3.9–12.7)

## 2021-04-14 PROCEDURE — 86803 HEPATITIS C AB TEST: CPT | Performed by: FAMILY MEDICINE

## 2021-04-14 PROCEDURE — 80061 LIPID PANEL: CPT | Performed by: FAMILY MEDICINE

## 2021-04-14 PROCEDURE — 99214 OFFICE O/P EST MOD 30 MIN: CPT | Mod: S$PBB,,, | Performed by: FAMILY MEDICINE

## 2021-04-14 PROCEDURE — 80053 COMPREHEN METABOLIC PANEL: CPT | Performed by: FAMILY MEDICINE

## 2021-04-14 PROCEDURE — 85027 COMPLETE CBC AUTOMATED: CPT | Performed by: FAMILY MEDICINE

## 2021-04-14 PROCEDURE — 83036 HEMOGLOBIN GLYCOSYLATED A1C: CPT | Performed by: FAMILY MEDICINE

## 2021-04-14 PROCEDURE — 99999 PR PBB SHADOW E&M-EST. PATIENT-LVL IV: ICD-10-PCS | Mod: PBBFAC,,, | Performed by: FAMILY MEDICINE

## 2021-04-14 PROCEDURE — 99214 PR OFFICE/OUTPT VISIT, EST, LEVL IV, 30-39 MIN: ICD-10-PCS | Mod: S$PBB,,, | Performed by: FAMILY MEDICINE

## 2021-04-14 PROCEDURE — 99999 PR PBB SHADOW E&M-EST. PATIENT-LVL IV: CPT | Mod: PBBFAC,,, | Performed by: FAMILY MEDICINE

## 2021-04-14 PROCEDURE — 36415 COLL VENOUS BLD VENIPUNCTURE: CPT | Performed by: FAMILY MEDICINE

## 2021-04-14 PROCEDURE — 99214 OFFICE O/P EST MOD 30 MIN: CPT | Mod: PBBFAC | Performed by: FAMILY MEDICINE

## 2021-04-14 PROCEDURE — 84153 ASSAY OF PSA TOTAL: CPT | Performed by: FAMILY MEDICINE

## 2021-04-14 RX ORDER — FUROSEMIDE 40 MG/1
40 TABLET ORAL DAILY
Qty: 30 TABLET | Refills: 11 | Status: SHIPPED | OUTPATIENT
Start: 2021-04-14 | End: 2021-05-24 | Stop reason: SDUPTHER

## 2021-04-15 LAB
ESTIMATED AVG GLUCOSE: 123 MG/DL (ref 68–131)
HBA1C MFR BLD: 5.9 % (ref 4–5.6)
HCV AB SERPL QL IA: NEGATIVE

## 2021-04-16 ENCOUNTER — TELEPHONE (OUTPATIENT)
Dept: INTERNAL MEDICINE | Facility: CLINIC | Age: 66
End: 2021-04-16

## 2021-04-28 ENCOUNTER — TELEPHONE (OUTPATIENT)
Dept: INTERNAL MEDICINE | Facility: CLINIC | Age: 66
End: 2021-04-28

## 2021-04-30 ENCOUNTER — TELEPHONE (OUTPATIENT)
Dept: CARDIOLOGY | Facility: HOSPITAL | Age: 66
End: 2021-04-30

## 2021-04-30 RX ORDER — CARVEDILOL 6.25 MG/1
12.5 TABLET ORAL 2 TIMES DAILY
Qty: 120 TABLET | Refills: 11 | OUTPATIENT
Start: 2021-04-30 | End: 2021-11-12

## 2021-05-04 ENCOUNTER — CLINICAL SUPPORT (OUTPATIENT)
Dept: CARDIOLOGY | Facility: HOSPITAL | Age: 66
End: 2021-05-04
Attending: INTERNAL MEDICINE
Payer: MEDICARE

## 2021-05-04 ENCOUNTER — OFFICE VISIT (OUTPATIENT)
Dept: ELECTROPHYSIOLOGY | Facility: CLINIC | Age: 66
End: 2021-05-04
Payer: MEDICARE

## 2021-05-04 ENCOUNTER — TELEPHONE (OUTPATIENT)
Dept: CARDIOLOGY | Facility: CLINIC | Age: 66
End: 2021-05-04

## 2021-05-04 VITALS
SYSTOLIC BLOOD PRESSURE: 167 MMHG | HEIGHT: 66 IN | WEIGHT: 151.69 LBS | HEART RATE: 61 BPM | DIASTOLIC BLOOD PRESSURE: 93 MMHG | BODY MASS INDEX: 24.38 KG/M2

## 2021-05-04 DIAGNOSIS — I48.0 PAROXYSMAL ATRIAL FIBRILLATION: ICD-10-CM

## 2021-05-04 DIAGNOSIS — I44.7 LBBB (LEFT BUNDLE BRANCH BLOCK): Chronic | ICD-10-CM

## 2021-05-04 DIAGNOSIS — I10 ESSENTIAL HYPERTENSION: ICD-10-CM

## 2021-05-04 DIAGNOSIS — I42.8 NICM (NONISCHEMIC CARDIOMYOPATHY): Primary | ICD-10-CM

## 2021-05-04 DIAGNOSIS — J45.909 ASTHMA, UNSPECIFIED ASTHMA SEVERITY, UNSPECIFIED WHETHER COMPLICATED, UNSPECIFIED WHETHER PERSISTENT: ICD-10-CM

## 2021-05-04 DIAGNOSIS — I49.8 OTHER SPECIFIED CARDIAC ARRHYTHMIAS: ICD-10-CM

## 2021-05-04 PROCEDURE — 99213 OFFICE O/P EST LOW 20 MIN: CPT | Mod: PBBFAC,25 | Performed by: INTERNAL MEDICINE

## 2021-05-04 PROCEDURE — 99999 PR PBB SHADOW E&M-EST. PATIENT-LVL III: ICD-10-PCS | Mod: PBBFAC,,, | Performed by: INTERNAL MEDICINE

## 2021-05-04 PROCEDURE — 99214 OFFICE O/P EST MOD 30 MIN: CPT | Mod: S$PBB,,, | Performed by: INTERNAL MEDICINE

## 2021-05-04 PROCEDURE — 99999 PR PBB SHADOW E&M-EST. PATIENT-LVL III: CPT | Mod: PBBFAC,,, | Performed by: INTERNAL MEDICINE

## 2021-05-04 PROCEDURE — 93284 PRGRMG EVAL IMPLANTABLE DFB: CPT | Mod: 26,,, | Performed by: INTERNAL MEDICINE

## 2021-05-04 PROCEDURE — 93284 PRGRMG EVAL IMPLANTABLE DFB: CPT

## 2021-05-04 PROCEDURE — 93010 RHYTHM STRIP: ICD-10-PCS | Mod: S$PBB,,, | Performed by: INTERNAL MEDICINE

## 2021-05-04 PROCEDURE — 93010 ELECTROCARDIOGRAM REPORT: CPT | Mod: S$PBB,,, | Performed by: INTERNAL MEDICINE

## 2021-05-04 PROCEDURE — 93284 CARDIAC DEVICE CHECK - IN CLINIC & HOSPITAL: ICD-10-PCS | Mod: 26,,, | Performed by: INTERNAL MEDICINE

## 2021-05-04 PROCEDURE — 99214 PR OFFICE/OUTPT VISIT, EST, LEVL IV, 30-39 MIN: ICD-10-PCS | Mod: S$PBB,,, | Performed by: INTERNAL MEDICINE

## 2021-05-04 PROCEDURE — 93005 ELECTROCARDIOGRAM TRACING: CPT | Mod: PBBFAC | Performed by: INTERNAL MEDICINE

## 2021-05-07 ENCOUNTER — OFFICE VISIT (OUTPATIENT)
Dept: CARDIOLOGY | Facility: CLINIC | Age: 66
End: 2021-05-07
Payer: MEDICARE

## 2021-05-07 VITALS
SYSTOLIC BLOOD PRESSURE: 174 MMHG | DIASTOLIC BLOOD PRESSURE: 83 MMHG | HEART RATE: 66 BPM | WEIGHT: 150.13 LBS | HEIGHT: 66 IN | BODY MASS INDEX: 24.13 KG/M2

## 2021-05-07 DIAGNOSIS — Z95.810 PRESENCE OF CARDIAC RESYNCHRONIZATION THERAPY DEFIBRILLATOR (CRT-D): ICD-10-CM

## 2021-05-07 DIAGNOSIS — I44.7 LBBB (LEFT BUNDLE BRANCH BLOCK): Chronic | ICD-10-CM

## 2021-05-07 DIAGNOSIS — I10 ESSENTIAL HYPERTENSION: ICD-10-CM

## 2021-05-07 DIAGNOSIS — I42.8 NICM (NONISCHEMIC CARDIOMYOPATHY): Primary | ICD-10-CM

## 2021-05-07 DIAGNOSIS — I48.0 PAROXYSMAL ATRIAL FIBRILLATION: ICD-10-CM

## 2021-05-07 PROCEDURE — 99999 PR PBB SHADOW E&M-EST. PATIENT-LVL III: ICD-10-PCS | Mod: PBBFAC,,, | Performed by: INTERNAL MEDICINE

## 2021-05-07 PROCEDURE — 99214 OFFICE O/P EST MOD 30 MIN: CPT | Mod: S$PBB,,, | Performed by: INTERNAL MEDICINE

## 2021-05-07 PROCEDURE — 99213 OFFICE O/P EST LOW 20 MIN: CPT | Mod: PBBFAC | Performed by: INTERNAL MEDICINE

## 2021-05-07 PROCEDURE — 99214 PR OFFICE/OUTPT VISIT, EST, LEVL IV, 30-39 MIN: ICD-10-PCS | Mod: S$PBB,,, | Performed by: INTERNAL MEDICINE

## 2021-05-07 PROCEDURE — 99999 PR PBB SHADOW E&M-EST. PATIENT-LVL III: CPT | Mod: PBBFAC,,, | Performed by: INTERNAL MEDICINE

## 2021-05-07 RX ORDER — ALBUTEROL SULFATE 90 UG/1
2 AEROSOL, METERED RESPIRATORY (INHALATION) EVERY 6 HOURS PRN
Qty: 18 G | Refills: 11 | Status: SHIPPED | OUTPATIENT
Start: 2021-05-07

## 2021-05-10 ENCOUNTER — OUTSIDE PLACE OF SERVICE (OUTPATIENT)
Dept: CARDIOLOGY | Facility: CLINIC | Age: 66
End: 2021-05-10
Payer: MEDICARE

## 2021-05-10 ENCOUNTER — PATIENT MESSAGE (OUTPATIENT)
Dept: RESEARCH | Facility: HOSPITAL | Age: 66
End: 2021-05-10

## 2021-05-10 PROCEDURE — 93010 PR ELECTROCARDIOGRAM REPORT: ICD-10-PCS | Mod: ,,, | Performed by: INTERNAL MEDICINE

## 2021-05-10 PROCEDURE — 93010 ELECTROCARDIOGRAM REPORT: CPT | Mod: ,,, | Performed by: INTERNAL MEDICINE

## 2021-05-13 DIAGNOSIS — J45.909 ASTHMA, UNSPECIFIED ASTHMA SEVERITY, UNSPECIFIED WHETHER COMPLICATED, UNSPECIFIED WHETHER PERSISTENT: Primary | ICD-10-CM

## 2021-05-17 ENCOUNTER — HOSPITAL ENCOUNTER (OUTPATIENT)
Dept: PULMONOLOGY | Facility: CLINIC | Age: 66
Discharge: HOME | End: 2021-05-17
Payer: MEDICARE

## 2021-05-17 ENCOUNTER — OFFICE VISIT (OUTPATIENT)
Dept: PULMONOLOGY | Facility: CLINIC | Age: 66
End: 2021-05-17
Payer: MEDICARE

## 2021-05-17 VITALS
SYSTOLIC BLOOD PRESSURE: 139 MMHG | WEIGHT: 146.81 LBS | HEIGHT: 66 IN | DIASTOLIC BLOOD PRESSURE: 79 MMHG | OXYGEN SATURATION: 97 % | HEART RATE: 70 BPM | BODY MASS INDEX: 23.59 KG/M2

## 2021-05-17 DIAGNOSIS — I48.0 PAROXYSMAL ATRIAL FIBRILLATION: ICD-10-CM

## 2021-05-17 DIAGNOSIS — J45.909 ASTHMA, UNSPECIFIED ASTHMA SEVERITY, UNSPECIFIED WHETHER COMPLICATED, UNSPECIFIED WHETHER PERSISTENT: ICD-10-CM

## 2021-05-17 DIAGNOSIS — Z13.9 SCREENING PROCEDURE: Primary | ICD-10-CM

## 2021-05-17 DIAGNOSIS — J45.40 MODERATE PERSISTENT ASTHMA WITHOUT COMPLICATION: Primary | ICD-10-CM

## 2021-05-17 DIAGNOSIS — I50.42 CHRONIC COMBINED SYSTOLIC AND DIASTOLIC CONGESTIVE HEART FAILURE: ICD-10-CM

## 2021-05-17 LAB
DLCO ADJ PRE: 8.92 ML/(MIN*MMHG) (ref 18.85–32.71)
DLCO SINGLE BREATH LLN: 18.85
DLCO SINGLE BREATH PRE REF: 34 %
DLCO SINGLE BREATH REF: 25.78
DLCOC SBVA LLN: 2.69
DLCOC SBVA PRE REF: 115.4 %
DLCOC SBVA REF: 3.96
DLCOC SINGLE BREATH LLN: 18.85
DLCOC SINGLE BREATH PRE REF: 34.6 %
DLCOC SINGLE BREATH REF: 25.78
DLCOCSBVAULN: 5.24
DLCOCSINGLEBREATHULN: 32.71
DLCOSINGLEBREATHULN: 32.71
DLCOVA LLN: 2.69
DLCOVA PRE REF: 113.4 %
DLCOVA PRE: 4.49 ML/(MIN*MMHG*L) (ref 2.69–5.24)
DLCOVA REF: 3.96
DLCOVAULN: 5.24
DLVAADJ PRE: 4.57 ML/(MIN*MMHG*L) (ref 2.69–5.24)
FEF 25 75 LLN: 0.87
FEF 25 75 PRE REF: 9.7 %
FEF 25 75 REF: 2.21
FET100 CHG: 4.5 %
FEV05 LLN: 1.32
FEV05 REF: 2.46
FEV1 CHG: -6 %
FEV1 FVC LLN: 66
FEV1 FVC PRE REF: 46.3 %
FEV1 FVC REF: 78
FEV1 LLN: 1.87
FEV1 PRE REF: 19 %
FEV1 REF: 2.64
FEV1 VOL CHG: -0.03
FVC CHG: -3.7 %
FVC LLN: 2.48
FVC PRE REF: 41.1 %
FVC REF: 3.39
FVC VOL CHG: -0.05
IVC PRE: 1.25 L (ref 2.48–4.29)
IVC SINGLE BREATH LLN: 2.48
IVC SINGLE BREATH PRE REF: 36.9 %
IVC SINGLE BREATH REF: 3.39
IVCSINGLEBREATHULN: 4.29
PEF LLN: 5.09
PEF PRE REF: 14.9 %
PEF REF: 7.51
PHYSICIAN COMMENT: ABNORMAL
POST FEF 25 75: 0.2 L/S (ref 0.87–3.54)
POST FET 100: 7.92 SEC
POST FEV1 FVC: 35.27 % (ref 65.68–90.24)
POST FEV1: 0.47 L (ref 1.87–3.41)
POST FEV5: 0.31 L (ref 1.32–3.59)
POST FVC: 1.34 L (ref 2.48–4.29)
POST PEF: 1.06 L/S (ref 5.09–9.94)
PRE DLCO: 8.77 ML/(MIN*MMHG) (ref 18.85–32.71)
PRE FEF 25 75: 0.21 L/S (ref 0.87–3.54)
PRE FET 100: 7.58 SEC
PRE FEV05 REF: 13.6 %
PRE FEV1 FVC: 36.11 % (ref 65.68–90.24)
PRE FEV1: 0.5 L (ref 1.87–3.41)
PRE FEV5: 0.34 L (ref 1.32–3.59)
PRE FVC: 1.39 L (ref 2.48–4.29)
PRE PEF: 1.12 L/S (ref 5.09–9.94)
SARS-COV-2 RDRP RESP QL NAA+PROBE: NEGATIVE
VA PRE: 1.95 L (ref 6.35–6.35)
VA SINGLE BREATH LLN: 6.35
VA SINGLE BREATH PRE REF: 30.7 %
VA SINGLE BREATH REF: 6.35
VASINGLEBREATHULN: 6.35

## 2021-05-17 PROCEDURE — 94729 PR C02/MEMBANE DIFFUSE CAPACITY: ICD-10-PCS | Mod: 26,S$PBB,, | Performed by: INTERNAL MEDICINE

## 2021-05-17 PROCEDURE — U0002 COVID-19 LAB TEST NON-CDC: HCPCS | Performed by: NURSE PRACTITIONER

## 2021-05-17 PROCEDURE — 94060 PR EVAL OF BRONCHOSPASM: ICD-10-PCS | Mod: 26,S$PBB,, | Performed by: INTERNAL MEDICINE

## 2021-05-17 PROCEDURE — 99214 PR OFFICE/OUTPT VISIT, EST, LEVL IV, 30-39 MIN: ICD-10-PCS | Mod: 25,S$PBB,ICN, | Performed by: NURSE PRACTITIONER

## 2021-05-17 PROCEDURE — 94729 DIFFUSING CAPACITY: CPT | Mod: 26,S$PBB,, | Performed by: INTERNAL MEDICINE

## 2021-05-17 PROCEDURE — 99999 PR PBB SHADOW E&M-EST. PATIENT-LVL IV: CPT | Mod: PBBFAC,,, | Performed by: NURSE PRACTITIONER

## 2021-05-17 PROCEDURE — 99999 PR PBB SHADOW E&M-EST. PATIENT-LVL IV: ICD-10-PCS | Mod: PBBFAC,,, | Performed by: NURSE PRACTITIONER

## 2021-05-17 PROCEDURE — 94060 EVALUATION OF WHEEZING: CPT | Mod: 26,S$PBB,, | Performed by: INTERNAL MEDICINE

## 2021-05-17 PROCEDURE — 94060 EVALUATION OF WHEEZING: CPT | Mod: PBBFAC | Performed by: INTERNAL MEDICINE

## 2021-05-17 PROCEDURE — 94729 DIFFUSING CAPACITY: CPT | Mod: PBBFAC | Performed by: INTERNAL MEDICINE

## 2021-05-17 PROCEDURE — 99214 OFFICE O/P EST MOD 30 MIN: CPT | Mod: 25,S$PBB,ICN, | Performed by: NURSE PRACTITIONER

## 2021-05-17 PROCEDURE — 99214 OFFICE O/P EST MOD 30 MIN: CPT | Mod: PBBFAC | Performed by: NURSE PRACTITIONER

## 2021-05-17 RX ORDER — PREDNISONE 20 MG/1
20 TABLET ORAL DAILY
Qty: 5 TABLET | Refills: 0 | Status: SHIPPED | OUTPATIENT
Start: 2021-05-17 | End: 2021-05-22

## 2021-05-17 RX ORDER — FLUTICASONE PROPIONATE AND SALMETEROL 100; 50 UG/1; UG/1
1 POWDER RESPIRATORY (INHALATION) 2 TIMES DAILY
Qty: 60 EACH | Refills: 2 | Status: SHIPPED | OUTPATIENT
Start: 2021-05-17 | End: 2022-06-17

## 2021-05-17 RX ORDER — FLUTICASONE PROPIONATE AND SALMETEROL 100; 50 UG/1; UG/1
1 POWDER RESPIRATORY (INHALATION) 2 TIMES DAILY
Qty: 60 EACH | Refills: 2 | OUTPATIENT
Start: 2021-05-17 | End: 2021-05-17 | Stop reason: SDUPTHER

## 2021-05-17 RX ORDER — FLUTICASONE PROPIONATE AND SALMETEROL 100; 50 UG/1; UG/1
1 POWDER RESPIRATORY (INHALATION) 2 TIMES DAILY
Qty: 60 EACH | Refills: 2 | Status: SHIPPED | OUTPATIENT
Start: 2021-05-17 | End: 2021-05-17

## 2021-05-19 ENCOUNTER — TELEPHONE (OUTPATIENT)
Dept: PULMONOLOGY | Facility: CLINIC | Age: 66
End: 2021-05-19

## 2021-05-24 ENCOUNTER — OFFICE VISIT (OUTPATIENT)
Dept: CARDIOLOGY | Facility: CLINIC | Age: 66
End: 2021-05-24
Payer: MEDICARE

## 2021-05-24 VITALS
HEART RATE: 74 BPM | HEIGHT: 66 IN | WEIGHT: 145 LBS | OXYGEN SATURATION: 94 % | SYSTOLIC BLOOD PRESSURE: 160 MMHG | BODY MASS INDEX: 23.3 KG/M2 | DIASTOLIC BLOOD PRESSURE: 79 MMHG

## 2021-05-24 DIAGNOSIS — I50.42 CHRONIC COMBINED SYSTOLIC AND DIASTOLIC CONGESTIVE HEART FAILURE: Primary | ICD-10-CM

## 2021-05-24 DIAGNOSIS — Z95.810 PRESENCE OF CARDIAC RESYNCHRONIZATION THERAPY DEFIBRILLATOR (CRT-D): ICD-10-CM

## 2021-05-24 DIAGNOSIS — I48.0 PAROXYSMAL ATRIAL FIBRILLATION: ICD-10-CM

## 2021-05-24 DIAGNOSIS — I10 ESSENTIAL HYPERTENSION: ICD-10-CM

## 2021-05-24 DIAGNOSIS — I42.8 NICM (NONISCHEMIC CARDIOMYOPATHY): ICD-10-CM

## 2021-05-24 DIAGNOSIS — I44.7 LBBB (LEFT BUNDLE BRANCH BLOCK): Chronic | ICD-10-CM

## 2021-05-24 PROCEDURE — 99214 PR OFFICE/OUTPT VISIT, EST, LEVL IV, 30-39 MIN: ICD-10-PCS | Mod: S$GLB,,, | Performed by: INTERNAL MEDICINE

## 2021-05-24 PROCEDURE — 99214 OFFICE O/P EST MOD 30 MIN: CPT | Mod: S$GLB,,, | Performed by: INTERNAL MEDICINE

## 2021-05-24 RX ORDER — SACUBITRIL AND VALSARTAN 49; 51 MG/1; MG/1
1 TABLET, FILM COATED ORAL 2 TIMES DAILY
Qty: 60 TABLET | Refills: 11 | Status: SHIPPED | OUTPATIENT
Start: 2021-05-24

## 2021-05-24 RX ORDER — FUROSEMIDE 40 MG/1
40 TABLET ORAL DAILY
Qty: 30 TABLET | Refills: 11 | Status: SHIPPED | OUTPATIENT
Start: 2021-05-24 | End: 2022-06-17

## 2021-06-21 ENCOUNTER — CLINICAL SUPPORT (OUTPATIENT)
Dept: CARDIOLOGY | Facility: HOSPITAL | Age: 66
End: 2021-06-21
Payer: MEDICARE

## 2021-06-21 DIAGNOSIS — Z95.810 PRESENCE OF AUTOMATIC (IMPLANTABLE) CARDIAC DEFIBRILLATOR: ICD-10-CM

## 2021-06-21 PROCEDURE — 93295 DEV INTERROG REMOTE 1/2/MLT: CPT | Mod: ,,, | Performed by: INTERNAL MEDICINE

## 2021-06-21 PROCEDURE — 93295 CARDIAC DEVICE CHECK - REMOTE: ICD-10-PCS | Mod: ,,, | Performed by: INTERNAL MEDICINE

## 2021-07-15 ENCOUNTER — TELEPHONE (OUTPATIENT)
Dept: PULMONOLOGY | Facility: CLINIC | Age: 66
End: 2021-07-15

## 2021-08-05 ENCOUNTER — HOSPITAL ENCOUNTER (EMERGENCY)
Facility: HOSPITAL | Age: 66
Discharge: HOME | End: 2021-08-05
Attending: EMERGENCY MEDICINE
Payer: MEDICARE

## 2021-08-05 ENCOUNTER — PATIENT MESSAGE (OUTPATIENT)
Dept: ADMINISTRATIVE | Facility: CLINIC | Age: 66
End: 2021-08-05

## 2021-08-05 ENCOUNTER — TELEPHONE (OUTPATIENT)
Dept: ADMINISTRATIVE | Facility: CLINIC | Age: 66
End: 2021-08-05

## 2021-08-05 VITALS
HEART RATE: 91 BPM | BODY MASS INDEX: 24.21 KG/M2 | WEIGHT: 150 LBS | SYSTOLIC BLOOD PRESSURE: 118 MMHG | OXYGEN SATURATION: 95 % | TEMPERATURE: 98 F | DIASTOLIC BLOOD PRESSURE: 74 MMHG | RESPIRATION RATE: 18 BRPM

## 2021-08-05 DIAGNOSIS — E86.0 DEHYDRATION: Primary | ICD-10-CM

## 2021-08-05 DIAGNOSIS — Z20.822 SUSPECTED COVID-19 VIRUS INFECTION: ICD-10-CM

## 2021-08-05 LAB
ALBUMIN SERPL BCP-MCNC: 4.2 G/DL (ref 3.5–5.2)
ALP SERPL-CCNC: 93 U/L (ref 38–126)
ALT SERPL W/O P-5'-P-CCNC: 71 U/L (ref 10–44)
ANION GAP SERPL CALC-SCNC: 9 MMOL/L (ref 8–16)
AST SERPL-CCNC: 59 U/L (ref 15–46)
BASOPHILS # BLD AUTO: 0 K/UL (ref 0–0.2)
BASOPHILS NFR BLD: 0 % (ref 0–1.9)
BILIRUB SERPL-MCNC: 0.9 MG/DL (ref 0.1–1)
CALCIUM SERPL-MCNC: 8.9 MG/DL (ref 8.7–10.5)
CHLORIDE SERPL-SCNC: 97 MMOL/L (ref 95–110)
CK SERPL-CCNC: 84 U/L (ref 55–170)
CO2 SERPL-SCNC: 30 MMOL/L (ref 23–29)
CREAT SERPL-MCNC: 1.11 MG/DL (ref 0.5–1.4)
CRP SERPL-MCNC: 2.55 MG/DL (ref 0–1)
DIFFERENTIAL METHOD: ABNORMAL
EOSINOPHIL # BLD AUTO: 0.1 K/UL (ref 0–0.5)
EOSINOPHIL NFR BLD: 3.2 % (ref 0–8)
ERYTHROCYTE [DISTWIDTH] IN BLOOD BY AUTOMATED COUNT: 13.6 % (ref 11.5–14.5)
EST. GFR  (AFRICAN AMERICAN): >60 ML/MIN/1.73 M^2
EST. GFR  (NON AFRICAN AMERICAN): >60 ML/MIN/1.73 M^2
GLUCOSE SERPL-MCNC: 101 MG/DL (ref 70–110)
HCT VFR BLD AUTO: 50 % (ref 40–54)
HGB BLD-MCNC: 16 G/DL (ref 14–18)
IMM GRANULOCYTES # BLD AUTO: 0 K/UL (ref 0–0.04)
IMM GRANULOCYTES NFR BLD AUTO: 0 % (ref 0–0.5)
LACTATE SERPL-SCNC: 1.3 MMOL/L (ref 0.5–2.2)
LDH SERPL L TO P-CCNC: 458 U/L (ref 110–260)
LYMPHOCYTES # BLD AUTO: 0.9 K/UL (ref 1–4.8)
LYMPHOCYTES NFR BLD: 23.4 % (ref 18–48)
MCH RBC QN AUTO: 28 PG (ref 27–31)
MCHC RBC AUTO-ENTMCNC: 32 G/DL (ref 32–36)
MCV RBC AUTO: 87 FL (ref 82–98)
MONOCYTES # BLD AUTO: 0.3 K/UL (ref 0.3–1)
MONOCYTES NFR BLD: 6.7 % (ref 4–15)
NEUTROPHILS # BLD AUTO: 2.5 K/UL (ref 1.8–7.7)
NEUTROPHILS NFR BLD: 66.7 % (ref 38–73)
NRBC BLD-RTO: 0 /100 WBC
PLATELET # BLD AUTO: 98 K/UL (ref 150–450)
PMV BLD AUTO: 13.8 FL (ref 9.2–12.9)
POTASSIUM SERPL-SCNC: 5.1 MMOL/L (ref 3.5–5.1)
PROT SERPL-MCNC: 7.5 G/DL (ref 6–8.4)
RBC # BLD AUTO: 5.72 M/UL (ref 4.6–6.2)
SARS-COV-2 RDRP RESP QL NAA+PROBE: POSITIVE
SODIUM SERPL-SCNC: 136 MMOL/L (ref 136–145)
TROPONIN I SERPL-MCNC: 0.02 NG/ML (ref 0.01–0.03)
UUN UR-MCNC: 25 MG/DL (ref 2–20)
WBC # BLD AUTO: 3.72 K/UL (ref 3.9–12.7)

## 2021-08-05 PROCEDURE — 80053 COMPREHEN METABOLIC PANEL: CPT | Mod: ER | Performed by: EMERGENCY MEDICINE

## 2021-08-05 PROCEDURE — 82728 ASSAY OF FERRITIN: CPT | Performed by: EMERGENCY MEDICINE

## 2021-08-05 PROCEDURE — 83605 ASSAY OF LACTIC ACID: CPT | Mod: ER | Performed by: EMERGENCY MEDICINE

## 2021-08-05 PROCEDURE — 82550 ASSAY OF CK (CPK): CPT | Mod: ER | Performed by: EMERGENCY MEDICINE

## 2021-08-05 PROCEDURE — 25000003 PHARM REV CODE 250: Mod: ER | Performed by: EMERGENCY MEDICINE

## 2021-08-05 PROCEDURE — 85025 COMPLETE CBC W/AUTO DIFF WBC: CPT | Mod: ER | Performed by: EMERGENCY MEDICINE

## 2021-08-05 PROCEDURE — 63600175 PHARM REV CODE 636 W HCPCS: Mod: ER | Performed by: EMERGENCY MEDICINE

## 2021-08-05 PROCEDURE — 86140 C-REACTIVE PROTEIN: CPT | Mod: ER | Performed by: EMERGENCY MEDICINE

## 2021-08-05 PROCEDURE — U0002 COVID-19 LAB TEST NON-CDC: HCPCS | Mod: ER | Performed by: EMERGENCY MEDICINE

## 2021-08-05 PROCEDURE — 93010 ELECTROCARDIOGRAM REPORT: CPT | Mod: ,,, | Performed by: INTERNAL MEDICINE

## 2021-08-05 PROCEDURE — 83615 LACTATE (LD) (LDH) ENZYME: CPT | Performed by: EMERGENCY MEDICINE

## 2021-08-05 PROCEDURE — 93005 ELECTROCARDIOGRAM TRACING: CPT | Mod: ER

## 2021-08-05 PROCEDURE — 93010 EKG 12-LEAD: ICD-10-PCS | Mod: ,,, | Performed by: INTERNAL MEDICINE

## 2021-08-05 PROCEDURE — 84484 ASSAY OF TROPONIN QUANT: CPT | Mod: ER | Performed by: EMERGENCY MEDICINE

## 2021-08-05 PROCEDURE — 96374 THER/PROPH/DIAG INJ IV PUSH: CPT | Mod: ER

## 2021-08-05 PROCEDURE — 99285 EMERGENCY DEPT VISIT HI MDM: CPT | Mod: 25,ER

## 2021-08-05 RX ORDER — ONDANSETRON 2 MG/ML
4 INJECTION INTRAMUSCULAR; INTRAVENOUS
Status: COMPLETED | OUTPATIENT
Start: 2021-08-05 | End: 2021-08-05

## 2021-08-05 RX ORDER — ONDANSETRON 4 MG/1
4 TABLET, ORALLY DISINTEGRATING ORAL EVERY 6 HOURS PRN
Qty: 15 TABLET | Refills: 0 | Status: SHIPPED | OUTPATIENT
Start: 2021-08-05 | End: 2022-06-17

## 2021-08-05 RX ORDER — ACETAMINOPHEN 500 MG
1000 TABLET ORAL
Status: COMPLETED | OUTPATIENT
Start: 2021-08-05 | End: 2021-08-05

## 2021-08-05 RX ORDER — ACETAMINOPHEN 500 MG
1000 TABLET ORAL EVERY 6 HOURS PRN
Qty: 50 TABLET | Refills: 0 | Status: SHIPPED | OUTPATIENT
Start: 2021-08-05

## 2021-08-05 RX ADMIN — ACETAMINOPHEN 1000 MG: 500 TABLET ORAL at 06:08

## 2021-08-05 RX ADMIN — ONDANSETRON 4 MG: 2 INJECTION INTRAMUSCULAR; INTRAVENOUS at 07:08

## 2021-08-06 ENCOUNTER — TELEPHONE (OUTPATIENT)
Dept: ADMINISTRATIVE | Facility: CLINIC | Age: 66
End: 2021-08-06

## 2021-08-06 LAB — FERRITIN SERPL-MCNC: 2127 NG/ML (ref 20–300)

## 2021-08-17 ENCOUNTER — LAB VISIT (OUTPATIENT)
Dept: PRIMARY CARE CLINIC | Facility: OTHER | Age: 66
End: 2021-08-17
Attending: INTERNAL MEDICINE
Payer: MEDICARE

## 2021-08-17 DIAGNOSIS — Z20.822 ENCOUNTER FOR LABORATORY TESTING FOR COVID-19 VIRUS: ICD-10-CM

## 2021-08-17 PROCEDURE — U0003 INFECTIOUS AGENT DETECTION BY NUCLEIC ACID (DNA OR RNA); SEVERE ACUTE RESPIRATORY SYNDROME CORONAVIRUS 2 (SARS-COV-2) (CORONAVIRUS DISEASE [COVID-19]), AMPLIFIED PROBE TECHNIQUE, MAKING USE OF HIGH THROUGHPUT TECHNOLOGIES AS DESCRIBED BY CMS-2020-01-R: HCPCS | Performed by: INTERNAL MEDICINE

## 2021-08-19 LAB
SARS-COV-2 RNA RESP QL NAA+PROBE: DETECTED
SARS-COV-2- CYCLE NUMBER: 34.47

## 2021-08-23 ENCOUNTER — LAB VISIT (OUTPATIENT)
Dept: PRIMARY CARE CLINIC | Facility: OTHER | Age: 66
End: 2021-08-23
Attending: INTERNAL MEDICINE
Payer: MEDICARE

## 2021-08-23 DIAGNOSIS — Z20.822 ENCOUNTER FOR LABORATORY TESTING FOR COVID-19 VIRUS: ICD-10-CM

## 2021-08-23 PROCEDURE — U0003 INFECTIOUS AGENT DETECTION BY NUCLEIC ACID (DNA OR RNA); SEVERE ACUTE RESPIRATORY SYNDROME CORONAVIRUS 2 (SARS-COV-2) (CORONAVIRUS DISEASE [COVID-19]), AMPLIFIED PROBE TECHNIQUE, MAKING USE OF HIGH THROUGHPUT TECHNOLOGIES AS DESCRIBED BY CMS-2020-01-R: HCPCS | Performed by: INTERNAL MEDICINE

## 2021-08-24 LAB
SARS-COV-2 RNA RESP QL NAA+PROBE: DETECTED
SARS-COV-2- CYCLE NUMBER: 22

## 2021-09-19 ENCOUNTER — CLINICAL SUPPORT (OUTPATIENT)
Dept: CARDIOLOGY | Facility: HOSPITAL | Age: 66
End: 2021-09-19
Payer: MEDICARE

## 2021-09-19 DIAGNOSIS — I50.42 CHRONIC COMBINED SYSTOLIC (CONGESTIVE) AND DIASTOLIC (CONGESTIVE) HEART FAILURE: ICD-10-CM

## 2021-09-19 DIAGNOSIS — Z95.810 PRESENCE OF AUTOMATIC (IMPLANTABLE) CARDIAC DEFIBRILLATOR: ICD-10-CM

## 2021-09-19 PROCEDURE — 93295 DEV INTERROG REMOTE 1/2/MLT: CPT | Mod: ,,, | Performed by: INTERNAL MEDICINE

## 2021-09-19 PROCEDURE — 93295 CARDIAC DEVICE CHECK - REMOTE: ICD-10-PCS | Mod: ,,, | Performed by: INTERNAL MEDICINE

## 2021-10-12 RX ORDER — ALBUTEROL SULFATE 0.63 MG/3ML
0.63 SOLUTION RESPIRATORY (INHALATION) EVERY 6 HOURS PRN
Qty: 1 VIAL | Refills: 5 | Status: SHIPPED | OUTPATIENT
Start: 2021-10-12

## 2021-11-04 ENCOUNTER — RESEARCH ENCOUNTER (OUTPATIENT)
Dept: RESEARCH | Facility: HOSPITAL | Age: 66
End: 2021-11-04
Payer: MEDICARE

## 2021-11-12 ENCOUNTER — RESEARCH ENCOUNTER (OUTPATIENT)
Dept: RESEARCH | Facility: HOSPITAL | Age: 66
End: 2021-11-12
Payer: MEDICARE

## 2021-11-12 ENCOUNTER — OFFICE VISIT (OUTPATIENT)
Dept: CARDIOLOGY | Facility: CLINIC | Age: 66
End: 2021-11-12
Payer: MEDICARE

## 2021-11-12 VITALS
HEART RATE: 73 BPM | SYSTOLIC BLOOD PRESSURE: 147 MMHG | BODY MASS INDEX: 24.06 KG/M2 | HEIGHT: 66 IN | DIASTOLIC BLOOD PRESSURE: 82 MMHG | WEIGHT: 149.69 LBS

## 2021-11-12 DIAGNOSIS — I44.7 LBBB (LEFT BUNDLE BRANCH BLOCK): Chronic | ICD-10-CM

## 2021-11-12 DIAGNOSIS — I49.8 OTHER SPECIFIED CARDIAC ARRHYTHMIAS: ICD-10-CM

## 2021-11-12 DIAGNOSIS — I50.42 CHRONIC COMBINED SYSTOLIC AND DIASTOLIC CONGESTIVE HEART FAILURE: ICD-10-CM

## 2021-11-12 DIAGNOSIS — I42.8 NICM (NONISCHEMIC CARDIOMYOPATHY): ICD-10-CM

## 2021-11-12 DIAGNOSIS — I10 ESSENTIAL HYPERTENSION: ICD-10-CM

## 2021-11-12 DIAGNOSIS — Z95.810 PRESENCE OF CARDIAC RESYNCHRONIZATION THERAPY DEFIBRILLATOR (CRT-D): ICD-10-CM

## 2021-11-12 DIAGNOSIS — I48.0 PAROXYSMAL ATRIAL FIBRILLATION: Primary | ICD-10-CM

## 2021-11-12 PROCEDURE — 99213 OFFICE O/P EST LOW 20 MIN: CPT | Mod: PBBFAC,PO | Performed by: INTERNAL MEDICINE

## 2021-11-12 PROCEDURE — 99214 OFFICE O/P EST MOD 30 MIN: CPT | Mod: S$PBB,,, | Performed by: INTERNAL MEDICINE

## 2021-11-12 PROCEDURE — 99214 PR OFFICE/OUTPT VISIT, EST, LEVL IV, 30-39 MIN: ICD-10-PCS | Mod: S$PBB,,, | Performed by: INTERNAL MEDICINE

## 2021-11-12 PROCEDURE — 93010 RHYTHM STRIP: ICD-10-PCS | Mod: S$PBB,,, | Performed by: INTERNAL MEDICINE

## 2021-11-12 PROCEDURE — 93010 ELECTROCARDIOGRAM REPORT: CPT | Mod: S$PBB,,, | Performed by: INTERNAL MEDICINE

## 2021-11-12 PROCEDURE — 99999 PR PBB SHADOW E&M-EST. PATIENT-LVL III: CPT | Mod: PBBFAC,,, | Performed by: INTERNAL MEDICINE

## 2021-11-12 PROCEDURE — 99999 PR PBB SHADOW E&M-EST. PATIENT-LVL III: ICD-10-PCS | Mod: PBBFAC,,, | Performed by: INTERNAL MEDICINE

## 2021-11-12 PROCEDURE — 93005 ELECTROCARDIOGRAM TRACING: CPT | Mod: PBBFAC,PO | Performed by: INTERNAL MEDICINE

## 2021-11-12 RX ORDER — CARVEDILOL 12.5 MG/1
12.5 TABLET ORAL 2 TIMES DAILY
Qty: 180 TABLET | Refills: 3 | Status: SHIPPED | OUTPATIENT
Start: 2021-11-12 | End: 2021-11-12

## 2021-11-12 RX ORDER — CARVEDILOL 12.5 MG/1
12.5 TABLET ORAL 2 TIMES DAILY
Qty: 180 TABLET | Refills: 3 | Status: SHIPPED | OUTPATIENT
Start: 2021-11-12 | End: 2022-11-12

## 2021-12-08 ENCOUNTER — RESEARCH ENCOUNTER (OUTPATIENT)
Dept: RESEARCH | Facility: HOSPITAL | Age: 66
End: 2021-12-08
Payer: MEDICARE

## 2021-12-14 ENCOUNTER — OUTSIDE PLACE OF SERVICE (OUTPATIENT)
Dept: CARDIOLOGY | Facility: CLINIC | Age: 66
End: 2021-12-14
Payer: MEDICARE

## 2021-12-14 PROCEDURE — 93010 PR ELECTROCARDIOGRAM REPORT: ICD-10-PCS | Mod: ,,, | Performed by: INTERNAL MEDICINE

## 2021-12-14 PROCEDURE — 93010 ELECTROCARDIOGRAM REPORT: CPT | Mod: ,,, | Performed by: INTERNAL MEDICINE

## 2021-12-18 ENCOUNTER — CLINICAL SUPPORT (OUTPATIENT)
Dept: CARDIOLOGY | Facility: HOSPITAL | Age: 66
End: 2021-12-18
Payer: MEDICARE

## 2021-12-18 DIAGNOSIS — Z95.810 PRESENCE OF AUTOMATIC (IMPLANTABLE) CARDIAC DEFIBRILLATOR: ICD-10-CM

## 2021-12-18 DIAGNOSIS — I42.9 CARDIOMYOPATHY, UNSPECIFIED: ICD-10-CM

## 2021-12-18 PROCEDURE — 93295 DEV INTERROG REMOTE 1/2/MLT: CPT | Mod: ,,, | Performed by: INTERNAL MEDICINE

## 2021-12-18 PROCEDURE — 93295 CARDIAC DEVICE CHECK - REMOTE: ICD-10-PCS | Mod: ,,, | Performed by: INTERNAL MEDICINE

## 2021-12-30 ENCOUNTER — OUTSIDE PLACE OF SERVICE (OUTPATIENT)
Dept: CARDIOLOGY | Facility: CLINIC | Age: 66
End: 2021-12-30
Payer: MEDICARE

## 2021-12-30 PROCEDURE — 93010 ELECTROCARDIOGRAM REPORT: CPT | Mod: ,,, | Performed by: INTERNAL MEDICINE

## 2021-12-30 PROCEDURE — 93010 PR ELECTROCARDIOGRAM REPORT: ICD-10-PCS | Mod: ,,, | Performed by: INTERNAL MEDICINE

## 2022-03-18 ENCOUNTER — CLINICAL SUPPORT (OUTPATIENT)
Dept: CARDIOLOGY | Facility: HOSPITAL | Age: 67
End: 2022-03-18
Payer: MEDICARE

## 2022-03-18 DIAGNOSIS — I50.42 CHRONIC COMBINED SYSTOLIC (CONGESTIVE) AND DIASTOLIC (CONGESTIVE) HEART FAILURE: ICD-10-CM

## 2022-03-18 DIAGNOSIS — Z95.810 PRESENCE OF AUTOMATIC (IMPLANTABLE) CARDIAC DEFIBRILLATOR: ICD-10-CM

## 2022-04-14 ENCOUNTER — PATIENT MESSAGE (OUTPATIENT)
Dept: ADMINISTRATIVE | Facility: HOSPITAL | Age: 67
End: 2022-04-14
Payer: MEDICARE

## 2022-04-20 ENCOUNTER — RESEARCH ENCOUNTER (OUTPATIENT)
Dept: RESEARCH | Facility: HOSPITAL | Age: 67
End: 2022-04-20
Payer: MEDICARE

## 2022-04-20 NOTE — PROGRESS NOTES
Date of Call: 20 Apr 2022    Sponsor: [employer]     Study Title/IRB Number: [id number]    Principle Investigator: Dr Seth Gutierrez    Call made by: Fernando Long [facility]     Spoke with patient for his six month follow up. Patient provided answers for all study required surveys and questionnaires.  The patient reported taking Eliquis as prescribed and no AEs to report .       Patient completed their participation in the study.

## 2022-05-06 ENCOUNTER — PES CALL (OUTPATIENT)
Dept: ADMINISTRATIVE | Facility: CLINIC | Age: 67
End: 2022-05-06
Payer: MEDICARE

## 2022-05-18 PROBLEM — I70.0 AORTIC ATHEROSCLEROSIS: Status: ACTIVE | Noted: 2022-05-18

## 2022-05-19 ENCOUNTER — TELEPHONE (OUTPATIENT)
Dept: ADMINISTRATIVE | Facility: CLINIC | Age: 67
End: 2022-05-19
Payer: MEDICARE

## 2022-05-23 ENCOUNTER — PES CALL (OUTPATIENT)
Dept: ADMINISTRATIVE | Facility: CLINIC | Age: 67
End: 2022-05-23
Payer: MEDICARE

## 2022-05-30 ENCOUNTER — OUTSIDE PLACE OF SERVICE (OUTPATIENT)
Dept: CARDIOLOGY | Facility: CLINIC | Age: 67
End: 2022-05-30
Payer: MEDICARE

## 2022-05-30 PROCEDURE — 93010 PR ELECTROCARDIOGRAM REPORT: ICD-10-PCS | Mod: ,,, | Performed by: INTERNAL MEDICINE

## 2022-05-30 PROCEDURE — 93010 ELECTROCARDIOGRAM REPORT: CPT | Mod: ,,, | Performed by: INTERNAL MEDICINE

## 2022-06-10 ENCOUNTER — OFFICE VISIT (OUTPATIENT)
Dept: CARDIOLOGY | Facility: CLINIC | Age: 67
End: 2022-06-10
Payer: MEDICARE

## 2022-06-10 VITALS
DIASTOLIC BLOOD PRESSURE: 61 MMHG | SYSTOLIC BLOOD PRESSURE: 117 MMHG | WEIGHT: 147.69 LBS | HEIGHT: 66 IN | BODY MASS INDEX: 23.74 KG/M2 | HEART RATE: 89 BPM

## 2022-06-10 DIAGNOSIS — I49.8 OTHER SPECIFIED CARDIAC ARRHYTHMIAS: ICD-10-CM

## 2022-06-10 DIAGNOSIS — I50.42 CHRONIC COMBINED SYSTOLIC AND DIASTOLIC CONGESTIVE HEART FAILURE: ICD-10-CM

## 2022-06-10 DIAGNOSIS — I10 ESSENTIAL HYPERTENSION: ICD-10-CM

## 2022-06-10 DIAGNOSIS — I48.0 PAROXYSMAL ATRIAL FIBRILLATION: Primary | ICD-10-CM

## 2022-06-10 DIAGNOSIS — Z95.810 PRESENCE OF CARDIAC RESYNCHRONIZATION THERAPY DEFIBRILLATOR (CRT-D): ICD-10-CM

## 2022-06-10 PROCEDURE — 99214 OFFICE O/P EST MOD 30 MIN: CPT | Mod: S$PBB,,, | Performed by: INTERNAL MEDICINE

## 2022-06-10 PROCEDURE — 99214 PR OFFICE/OUTPT VISIT, EST, LEVL IV, 30-39 MIN: ICD-10-PCS | Mod: S$PBB,,, | Performed by: INTERNAL MEDICINE

## 2022-06-10 PROCEDURE — 99213 OFFICE O/P EST LOW 20 MIN: CPT | Mod: PBBFAC,PO | Performed by: INTERNAL MEDICINE

## 2022-06-10 PROCEDURE — 93010 RHYTHM STRIP: ICD-10-PCS | Mod: S$PBB,,, | Performed by: INTERNAL MEDICINE

## 2022-06-10 PROCEDURE — 99999 PR PBB SHADOW E&M-EST. PATIENT-LVL III: ICD-10-PCS | Mod: PBBFAC,,, | Performed by: INTERNAL MEDICINE

## 2022-06-10 PROCEDURE — 93005 ELECTROCARDIOGRAM TRACING: CPT | Mod: PBBFAC,PO | Performed by: INTERNAL MEDICINE

## 2022-06-10 PROCEDURE — 93010 ELECTROCARDIOGRAM REPORT: CPT | Mod: S$PBB,,, | Performed by: INTERNAL MEDICINE

## 2022-06-10 PROCEDURE — 99999 PR PBB SHADOW E&M-EST. PATIENT-LVL III: CPT | Mod: PBBFAC,,, | Performed by: INTERNAL MEDICINE

## 2022-06-10 NOTE — PROGRESS NOTES
Subjective:    Patient ID:  Dima Quintanilla is a 66 y.o. male who presents for evaluation of ICD check    Primary Cardiologist: Rupesh Solorzano MD  Primary Care Physician: Sweetie Riojas MD    HPI     Prior Hx:  I had the pleasure of seeing Mr. Quintanilla today in our electrophysiology clinic in consultation for his sudden cardiac death risk from his systolic cardiomyopathy. As you are aware he is a pleasant 66 year-old man nonischemic dilated systolic cardiomyopathy (LVEF 10% 5/2019, normal coronary arteries on angiography), LBBB, hypertension and COPD. He presented to [facility] on 5/25/2019 with symptoms of congestive heart failure. He was initiated on diuretic and beta-blocker therapy. He was chronically taking lisinopril. He presented again to the ER on 8/8/2019 with shortness of breath and was given IV lasix with symptomatic improvement. A repeat ECHO on 8/16/2019 noted LVEF of 15%.     I reviewed available ECGs in Epic which note sinus rhythm with LBBB (QRS ~150ms)    Mr. Quintanilla underwent CRT-D implantation 9/2019. Repeat ECHO 3/2021 noted LVEF of 20%. He reports feeling well. He recently had an episode of AF with RVR in the VF zone and was shocked. This converted him out of AF as well (total time in AF was 2 minutes). He reports he was choking on a piece of chicken at the time. Today we reprogrammed his VF zone. Otherwise lead parameters are normal/stable with 36% RA and 98% BiV pacing.     He has not seen general cardiology since 2/2020.     Interim Hx:  Mr. Quintanilla presents for follow-up. He feels well. No bleeding issues on eliquis.    In clinic device interrogation notes stable lead parameters with 30% RA and 97% BiV pacing, No AF/VT. Estimated battery longevity 4 years    My interpretation of today's in clinic ECG is sinus rhythm with BiV pacing.    Review of Systems   Constitutional: Negative for fever and malaise/fatigue.   HENT: Negative for congestion and sore throat.    Eyes: Negative for blurred vision and  visual disturbance.   Cardiovascular: Negative for dyspnea on exertion, irregular heartbeat, leg swelling, near-syncope, orthopnea and paroxysmal nocturnal dyspnea.   Respiratory: Negative for cough.    Hematologic/Lymphatic: Negative for bleeding problem. Does not bruise/bleed easily.   Skin: Negative.    Musculoskeletal: Negative.    Neurological: Negative for focal weakness.        Objective:    Physical Exam  Vitals reviewed.   Constitutional:       General: He is not in acute distress.     Appearance: He is well-developed. He is not diaphoretic.   HENT:      Head: Normocephalic and atraumatic.   Eyes:      General:         Right eye: No discharge.         Left eye: No discharge.      Conjunctiva/sclera: Conjunctivae normal.   Neck:      Vascular: No JVD.   Cardiovascular:      Rate and Rhythm: Normal rate and regular rhythm.      Heart sounds: No murmur heard.    No friction rub. No gallop.   Pulmonary:      Effort: Pulmonary effort is normal. No respiratory distress.      Breath sounds: Normal breath sounds. No wheezing or rales.   Abdominal:      General: Bowel sounds are normal. There is no distension.      Palpations: Abdomen is soft.      Tenderness: There is no abdominal tenderness. There is no rebound.   Musculoskeletal:      Cervical back: Neck supple.   Skin:     General: Skin is warm and dry.   Neurological:      Mental Status: He is alert and oriented to person, place, and time.   Psychiatric:         Behavior: Behavior normal.         Thought Content: Thought content normal.         Judgment: Judgment normal.           Assessment:       1. Paroxysmal atrial fibrillation    2. Chronic combined systolic and diastolic congestive heart failure    3. Presence of cardiac resynchronization therapy defibrillator (CRT-D)    4. Essential hypertension         Plan:     In summary, Mr. Quintanilla is a pleasant 66 year-old man nonischemic dilated systolic cardiomyopathy (LVEF 10% 5/2019, normal coronary arteries on  angiography), LBBB, hypertension and COPD. He is s/p CRT-D. He now has pAF with an inappropriate ICD shock. Device reprogrammed. Carvedilol increased to 12.5mg bid. I had a long discussion with the patient about the pathophysiology and risks of atrial fibrillation and its basic pathophysiology, including its health implications and treatment options. Specifically, I addressed the need for CVA (stroke) prophylaxis with aspirin versus oral anticoagulation (warfarin vs DOACs, discussed bleeding risks, and need to come to the ER for any head trauma for CT scanning even if asymptomatic). His [id number] score is 2 and anticoagulation is recommended. Will continue eliquis. No further AF observed. Will monitor for further events.    RTC in 6 months. Needs updated CBC/BMP    Thank you for allowing me to participate in the care of this patient. Please do not hesitate to call me with any questions or concerns.    .Yaniv Townsend MD, PhD  Cardiac Electrophysiology

## 2022-06-14 NOTE — PROGRESS NOTES
"  Dima Quintanilla presented for a  Medicare AWV and comprehensive Health Risk Assessment today. The following components were reviewed and updated:    · Medical history  · Family History  · Social history  · Allergies and Current Medications  · Health Risk Assessment  · Health Maintenance  · Care Team         ** See Completed Assessments for Annual Wellness Visit within the encounter summary.**         The following assessments were completed:  · Living Situation  · CAGE  · Depression Screening  · Timed Get Up and Go  · Whisper Test  · Cognitive Function Screening  · Nutrition Screening  · ADL Screening  · PAQ Screening        Vitals:    06/17/22 1307   BP: 132/80   Pulse: 81   Temp: 98.5 °F (36.9 °C)   SpO2: 98%   Weight: 70.1 kg (154 lb 6.9 oz)   Height: 5' 6" (1.676 m)     Body mass index is 24.93 kg/m².  Physical Exam  Vitals and nursing note reviewed.   Constitutional:       General: He is not in acute distress.     Appearance: Normal appearance. He is not ill-appearing.   HENT:      Head: Normocephalic and atraumatic.   Eyes:      General: No scleral icterus.        Right eye: No discharge.         Left eye: No discharge.      Extraocular Movements: Extraocular movements intact.      Conjunctiva/sclera: Conjunctivae normal.   Cardiovascular:      Rate and Rhythm: Normal rate and regular rhythm.      Heart sounds: Normal heart sounds. No murmur heard.  Pulmonary:      Effort: Pulmonary effort is normal. No respiratory distress.      Breath sounds: Wheezing (intermittent LEIGHA) present. No rhonchi or rales.   Musculoskeletal:      Cervical back: Normal range of motion.      Right lower leg: No edema.      Left lower leg: No edema.   Skin:     General: Skin is warm and dry.      Findings: No rash.   Neurological:      Mental Status: He is alert and oriented to person, place, and time.   Psychiatric:         Mood and Affect: Mood normal.         Behavior: Behavior normal. Behavior is cooperative.         Cognition and " Memory: Cognition and memory normal.               Diagnoses and health risks identified today and associated recommendations/orders:    1. Encounter for preventive health examination  - Chart reviewed. Problem list updated. Discussed current medical diagnosis, current medications, medical/surgical/family/social history; updated provider list; documented vital signs; identified any cognitive impairment; and updated risk factor list. Addressed any outstanding health maintenance. Provided patient with personalized health advice. Continue to follow up with PCP and any specialists.       2. Chronic obstructive pulmonary disease, unspecified COPD type  Chronic; stable on current treatment plan; follow up with PCP  - recommend using inhalers as needed    3. Aortic atherosclerosis - seen on Xray chest 8/5/21  Chronic; stable on current treatment plan; follow up with PCP  - seen on Xray chest 8/5/21  - follows with cardiology     4. Thrombocytopenia, unspecified  Chronic; stable on current treatment plan; follow up with PCP    5. Chronic combined systolic and diastolic congestive heart failure  Chronic; stable on current treatment plan; follow up with PCP  - recommend low sodium diet   - follows with cardiology     6. Essential hypertension  Chronic; stable on current treatment plan; follow up with PCP  - recommend low sodium diet   - follows with cardiology     7. NICM (nonischemic cardiomyopathy)  Chronic; stable on current treatment plan; follow up with PCP  - follows with cardiology     8. Paroxysmal atrial fibrillation  Chronic; stable on current treatment plan; follow up with PCP  - follows with cardiology     9. Anemia of chronic disease  Chronic; stable on current treatment plan; follow up with PCP    10. Screening for malignant neoplasm of colon  - due for colonoscopy, patient refused order     11. BMI 24.0-24.9, adult  - Recommendation for healthy diet and increasing exercise as tolerated with goal of  150min/week        Provided Dima with a 5-10 year written screening schedule and personal prevention plan. Recommendations were developed using the USPSTF age appropriate recommendations. Education, counseling, and referrals were provided as needed. After Visit Summary printed and given to patient which includes a list of additional screenings\tests needed.    Follow up in about 1 year (around 6/17/2023) for your next annual wellness visit.    Tessy Jones, FNP-C   Advance Care Planning         I offered to discuss advanced care planning, including how to pick a person who would make decisions for you if you were unable to make them for yourself, called a health care power of , and what kind of decisions you might make such as use of life sustaining treatments such as ventilators and tube feeding when faced with a life limiting illness recorded on a living will that they will need to know. (How you want to be cared for as you near the end of your natural life)     X  Patient is unwilling to engage in a discussion regarding advance directives at this time.

## 2022-06-16 ENCOUNTER — CLINICAL SUPPORT (OUTPATIENT)
Dept: CARDIOLOGY | Facility: HOSPITAL | Age: 67
End: 2022-06-16
Payer: MEDICARE

## 2022-06-16 DIAGNOSIS — I48.91 UNSPECIFIED ATRIAL FIBRILLATION: ICD-10-CM

## 2022-06-16 DIAGNOSIS — Z95.810 PRESENCE OF AUTOMATIC (IMPLANTABLE) CARDIAC DEFIBRILLATOR: ICD-10-CM

## 2022-06-16 DIAGNOSIS — I50.42 CHRONIC COMBINED SYSTOLIC (CONGESTIVE) AND DIASTOLIC (CONGESTIVE) HEART FAILURE: ICD-10-CM

## 2022-06-16 DIAGNOSIS — I42.9 CARDIOMYOPATHY, UNSPECIFIED: ICD-10-CM

## 2022-06-16 PROCEDURE — 93295 CARDIAC DEVICE CHECK - REMOTE: ICD-10-PCS | Mod: ,,, | Performed by: INTERNAL MEDICINE

## 2022-06-16 PROCEDURE — 93295 DEV INTERROG REMOTE 1/2/MLT: CPT | Mod: ,,, | Performed by: INTERNAL MEDICINE

## 2022-06-17 ENCOUNTER — OFFICE VISIT (OUTPATIENT)
Dept: INTERNAL MEDICINE | Facility: CLINIC | Age: 67
End: 2022-06-17
Payer: MEDICARE

## 2022-06-17 VITALS
TEMPERATURE: 99 F | WEIGHT: 154.44 LBS | SYSTOLIC BLOOD PRESSURE: 132 MMHG | HEART RATE: 81 BPM | BODY MASS INDEX: 24.82 KG/M2 | HEIGHT: 66 IN | DIASTOLIC BLOOD PRESSURE: 80 MMHG | OXYGEN SATURATION: 98 %

## 2022-06-17 DIAGNOSIS — J44.9 CHRONIC OBSTRUCTIVE PULMONARY DISEASE, UNSPECIFIED COPD TYPE: ICD-10-CM

## 2022-06-17 DIAGNOSIS — D63.8 ANEMIA OF CHRONIC DISEASE: ICD-10-CM

## 2022-06-17 DIAGNOSIS — I10 ESSENTIAL HYPERTENSION: ICD-10-CM

## 2022-06-17 DIAGNOSIS — I42.8 NICM (NONISCHEMIC CARDIOMYOPATHY): ICD-10-CM

## 2022-06-17 DIAGNOSIS — Z12.11 SCREENING FOR MALIGNANT NEOPLASM OF COLON: ICD-10-CM

## 2022-06-17 DIAGNOSIS — I70.0 AORTIC ATHEROSCLEROSIS: ICD-10-CM

## 2022-06-17 DIAGNOSIS — I50.42 CHRONIC COMBINED SYSTOLIC AND DIASTOLIC CONGESTIVE HEART FAILURE: ICD-10-CM

## 2022-06-17 DIAGNOSIS — I48.0 PAROXYSMAL ATRIAL FIBRILLATION: ICD-10-CM

## 2022-06-17 DIAGNOSIS — D69.6 THROMBOCYTOPENIA, UNSPECIFIED: ICD-10-CM

## 2022-06-17 DIAGNOSIS — Z00.00 ENCOUNTER FOR PREVENTIVE HEALTH EXAMINATION: Primary | ICD-10-CM

## 2022-06-17 PROCEDURE — G0402 INITIAL PREVENTIVE EXAM: HCPCS | Mod: ,,, | Performed by: NURSE PRACTITIONER

## 2022-06-17 PROCEDURE — 99999 PR PBB SHADOW E&M-EST. PATIENT-LVL IV: ICD-10-PCS | Mod: PBBFAC,,, | Performed by: NURSE PRACTITIONER

## 2022-06-17 PROCEDURE — 99999 PR PBB SHADOW E&M-EST. PATIENT-LVL IV: CPT | Mod: PBBFAC,,, | Performed by: NURSE PRACTITIONER

## 2022-06-17 PROCEDURE — 99214 OFFICE O/P EST MOD 30 MIN: CPT | Mod: PBBFAC,PN | Performed by: NURSE PRACTITIONER

## 2022-06-17 PROCEDURE — G0402 PR WELCOME MEDICARE PREVENTIVE VISIT NEW ENROLLEE: ICD-10-PCS | Mod: ,,, | Performed by: NURSE PRACTITIONER

## 2022-06-17 NOTE — PATIENT INSTRUCTIONS
Counseling and Referral of Other Preventative  (Italic type indicates deductible and co-insurance are waived)    Patient Name: Dima Quintanilla  Today's Date: 6/17/2022    Health Maintenance       Date Due Completion Date    PROSTATE-SPECIFIC ANTIGEN 06/22/2022 (Originally 4/14/2022) 4/14/2021    Shingles Vaccine (1 of 2) 06/17/2023 (Originally 11/19/2006) ---    Colorectal Cancer Screening 06/17/2023 (Originally 1955) ---    COVID-19 Vaccine (3 - Booster for Pfizer series) 06/17/2023 (Originally 3/21/2022) 10/21/2021    Pneumococcal Vaccines (Age 65+) (1 - PCV) 06/17/2023 (Originally 11/19/2021) ---    Influenza Vaccine (Season Ended) 09/01/2022 ---    High Dose Statin 06/17/2023 6/17/2022    Lipid Panel 04/14/2026 4/14/2021    TETANUS VACCINE 02/22/2027 2/22/2017        No orders of the defined types were placed in this encounter.    The following information is provided to all patients.  This information is to help you find resources for any of the problems found today that may be affecting your health:                Living healthy guide: www.[facility].[place].gov      Understanding Diabetes: www.diabetes.org      Eating healthy: www.cdc.gov/healthyweight      CDC home safety checklist: www.cdc.gov/steadi/patient.html      Agency on Aging: www.goea.[place].[facility]      Alcoholics anonymous (AA): www.aa.org      Physical Activity: www.james.nih.gov/pv8ielj      Tobacco use: www.quitwithusla.org

## 2022-06-27 ENCOUNTER — LAB VISIT (OUTPATIENT)
Dept: LAB | Facility: HOSPITAL | Age: 67
End: 2022-06-27
Attending: INTERNAL MEDICINE
Payer: MEDICARE

## 2022-06-27 DIAGNOSIS — I48.0 PAROXYSMAL ATRIAL FIBRILLATION: ICD-10-CM

## 2022-06-27 LAB
ANION GAP SERPL CALC-SCNC: 4 MMOL/L (ref 8–16)
BASOPHILS # BLD AUTO: 0.01 K/UL (ref 0–0.2)
BASOPHILS NFR BLD: 0.1 % (ref 0–1.9)
CALCIUM SERPL-MCNC: 8.9 MG/DL (ref 8.7–10.5)
CHLORIDE SERPL-SCNC: 102 MMOL/L (ref 95–110)
CO2 SERPL-SCNC: 34 MMOL/L (ref 23–29)
CREAT SERPL-MCNC: 1.33 MG/DL (ref 0.5–1.4)
DIFFERENTIAL METHOD: ABNORMAL
EOSINOPHIL # BLD AUTO: 0 K/UL (ref 0–0.5)
EOSINOPHIL NFR BLD: 0.1 % (ref 0–8)
ERYTHROCYTE [DISTWIDTH] IN BLOOD BY AUTOMATED COUNT: 14.2 % (ref 11.5–14.5)
EST. GFR  (AFRICAN AMERICAN): >60 ML/MIN/1.73 M^2
EST. GFR  (NON AFRICAN AMERICAN): 55.7 ML/MIN/1.73 M^2
GLUCOSE SERPL-MCNC: 105 MG/DL (ref 70–110)
HCT VFR BLD AUTO: 46.5 % (ref 40–54)
HGB BLD-MCNC: 14.7 G/DL (ref 14–18)
IMM GRANULOCYTES # BLD AUTO: 0.03 K/UL (ref 0–0.04)
IMM GRANULOCYTES NFR BLD AUTO: 0.4 % (ref 0–0.5)
LYMPHOCYTES # BLD AUTO: 0.6 K/UL (ref 1–4.8)
LYMPHOCYTES NFR BLD: 7.5 % (ref 18–48)
MCH RBC QN AUTO: 27.8 PG (ref 27–31)
MCHC RBC AUTO-ENTMCNC: 31.6 G/DL (ref 32–36)
MCV RBC AUTO: 88 FL (ref 82–98)
MONOCYTES # BLD AUTO: 0.2 K/UL (ref 0.3–1)
MONOCYTES NFR BLD: 2.9 % (ref 4–15)
NEUTROPHILS # BLD AUTO: 6.8 K/UL (ref 1.8–7.7)
NEUTROPHILS NFR BLD: 89 % (ref 38–73)
NRBC BLD-RTO: 0 /100 WBC
PLATELET # BLD AUTO: 167 K/UL (ref 150–450)
PMV BLD AUTO: 12 FL (ref 9.2–12.9)
POTASSIUM SERPL-SCNC: 4.4 MMOL/L (ref 3.5–5.1)
RBC # BLD AUTO: 5.29 M/UL (ref 4.6–6.2)
SODIUM SERPL-SCNC: 140 MMOL/L (ref 136–145)
UUN UR-MCNC: 16 MG/DL (ref 2–20)
WBC # BLD AUTO: 7.64 K/UL (ref 3.9–12.7)

## 2022-06-27 PROCEDURE — 36415 COLL VENOUS BLD VENIPUNCTURE: CPT | Mod: PO | Performed by: INTERNAL MEDICINE

## 2022-06-27 PROCEDURE — 80048 BASIC METABOLIC PNL TOTAL CA: CPT | Mod: PO | Performed by: INTERNAL MEDICINE

## 2022-06-27 PROCEDURE — 85025 COMPLETE CBC W/AUTO DIFF WBC: CPT | Mod: PO | Performed by: INTERNAL MEDICINE

## 2022-09-14 ENCOUNTER — CLINICAL SUPPORT (OUTPATIENT)
Dept: CARDIOLOGY | Facility: HOSPITAL | Age: 67
End: 2022-09-14
Payer: MEDICARE

## 2022-09-14 DIAGNOSIS — I48.91 UNSPECIFIED ATRIAL FIBRILLATION: ICD-10-CM

## 2022-09-14 DIAGNOSIS — I42.9 CARDIOMYOPATHY, UNSPECIFIED: ICD-10-CM

## 2022-09-14 DIAGNOSIS — Z95.810 PRESENCE OF AUTOMATIC (IMPLANTABLE) CARDIAC DEFIBRILLATOR: ICD-10-CM

## 2022-12-02 ENCOUNTER — PATIENT MESSAGE (OUTPATIENT)
Dept: RESEARCH | Facility: HOSPITAL | Age: 67
End: 2022-12-02
Payer: MEDICARE

## 2022-12-13 ENCOUNTER — CLINICAL SUPPORT (OUTPATIENT)
Dept: CARDIOLOGY | Facility: HOSPITAL | Age: 67
End: 2022-12-13
Payer: MEDICARE

## 2022-12-13 DIAGNOSIS — Z95.810 PRESENCE OF AUTOMATIC (IMPLANTABLE) CARDIAC DEFIBRILLATOR: ICD-10-CM

## 2022-12-13 DIAGNOSIS — I42.9 CARDIOMYOPATHY, UNSPECIFIED: ICD-10-CM

## 2022-12-13 DIAGNOSIS — I48.91 UNSPECIFIED ATRIAL FIBRILLATION: ICD-10-CM

## 2022-12-13 PROCEDURE — 93295 CARDIAC DEVICE CHECK - REMOTE: ICD-10-PCS | Mod: ,,, | Performed by: INTERNAL MEDICINE

## 2022-12-13 PROCEDURE — 93295 DEV INTERROG REMOTE 1/2/MLT: CPT | Mod: ,,, | Performed by: INTERNAL MEDICINE

## 2023-03-13 ENCOUNTER — CLINICAL SUPPORT (OUTPATIENT)
Dept: CARDIOLOGY | Facility: HOSPITAL | Age: 68
End: 2023-03-13
Payer: MEDICARE

## 2023-03-13 DIAGNOSIS — I48.91 UNSPECIFIED ATRIAL FIBRILLATION: ICD-10-CM

## 2023-03-13 DIAGNOSIS — Z95.810 PRESENCE OF AUTOMATIC (IMPLANTABLE) CARDIAC DEFIBRILLATOR: ICD-10-CM

## 2023-03-13 DIAGNOSIS — I42.9 CARDIOMYOPATHY, UNSPECIFIED: ICD-10-CM

## 2023-05-07 ENCOUNTER — OUTSIDE PLACE OF SERVICE (OUTPATIENT)
Dept: CARDIOLOGY | Facility: CLINIC | Age: 68
End: 2023-05-07
Payer: MEDICARE

## 2023-05-07 PROCEDURE — 93010 PR ELECTROCARDIOGRAM REPORT: ICD-10-PCS | Mod: ,,, | Performed by: INTERNAL MEDICINE

## 2023-05-07 PROCEDURE — 93010 ELECTROCARDIOGRAM REPORT: CPT | Mod: ,,, | Performed by: INTERNAL MEDICINE

## 2023-06-11 ENCOUNTER — CLINICAL SUPPORT (OUTPATIENT)
Dept: CARDIOLOGY | Facility: HOSPITAL | Age: 68
End: 2023-06-11
Payer: MEDICARE

## 2023-06-11 DIAGNOSIS — Z95.810 PRESENCE OF AUTOMATIC (IMPLANTABLE) CARDIAC DEFIBRILLATOR: ICD-10-CM

## 2023-06-11 PROCEDURE — 93295 CARDIAC DEVICE CHECK - REMOTE: ICD-10-PCS | Mod: ,,, | Performed by: INTERNAL MEDICINE

## 2023-06-11 PROCEDURE — 93296 REM INTERROG EVL PM/IDS: CPT | Performed by: INTERNAL MEDICINE

## 2023-06-11 PROCEDURE — 93295 DEV INTERROG REMOTE 1/2/MLT: CPT | Mod: ,,, | Performed by: INTERNAL MEDICINE

## 2023-09-09 ENCOUNTER — CLINICAL SUPPORT (OUTPATIENT)
Dept: CARDIOLOGY | Facility: HOSPITAL | Age: 68
End: 2023-09-09
Payer: MEDICARE

## 2023-09-09 DIAGNOSIS — Z95.810 PRESENCE OF AUTOMATIC (IMPLANTABLE) CARDIAC DEFIBRILLATOR: ICD-10-CM

## 2023-09-09 PROCEDURE — 93296 REM INTERROG EVL PM/IDS: CPT | Performed by: INTERNAL MEDICINE

## 2023-12-09 ENCOUNTER — CLINICAL SUPPORT (OUTPATIENT)
Dept: CARDIOLOGY | Facility: HOSPITAL | Age: 68
End: 2023-12-09
Payer: MEDICARE

## 2023-12-09 ENCOUNTER — CLINICAL SUPPORT (OUTPATIENT)
Dept: CARDIOLOGY | Facility: HOSPITAL | Age: 68
End: 2023-12-09
Attending: INTERNAL MEDICINE
Payer: MEDICARE

## 2023-12-09 DIAGNOSIS — Z95.810 PRESENCE OF AUTOMATIC (IMPLANTABLE) CARDIAC DEFIBRILLATOR: ICD-10-CM

## 2023-12-09 PROCEDURE — 93295 CARDIAC DEVICE CHECK - REMOTE: ICD-10-PCS | Mod: ,,, | Performed by: INTERNAL MEDICINE

## 2023-12-09 PROCEDURE — 93295 DEV INTERROG REMOTE 1/2/MLT: CPT | Mod: ,,, | Performed by: INTERNAL MEDICINE

## 2023-12-09 PROCEDURE — 93296 REM INTERROG EVL PM/IDS: CPT | Performed by: INTERNAL MEDICINE

## 2023-12-20 LAB
OHS CV AF BURDEN PERCENT: < 1
OHS CV BIV PACING PERCENT: 97 %
OHS CV DC REMOTE DEVICE TYPE: NORMAL

## 2024-03-09 ENCOUNTER — CLINICAL SUPPORT (OUTPATIENT)
Dept: CARDIOLOGY | Facility: HOSPITAL | Age: 69
End: 2024-03-09
Attending: INTERNAL MEDICINE
Payer: MEDICARE

## 2024-03-09 ENCOUNTER — CLINICAL SUPPORT (OUTPATIENT)
Dept: CARDIOLOGY | Facility: HOSPITAL | Age: 69
End: 2024-03-09
Payer: MEDICARE

## 2024-03-09 DIAGNOSIS — Z95.810 PRESENCE OF AUTOMATIC (IMPLANTABLE) CARDIAC DEFIBRILLATOR: ICD-10-CM

## 2024-03-09 PROCEDURE — 93295 DEV INTERROG REMOTE 1/2/MLT: CPT | Mod: ,,, | Performed by: INTERNAL MEDICINE

## 2024-03-09 PROCEDURE — 93296 REM INTERROG EVL PM/IDS: CPT | Performed by: INTERNAL MEDICINE

## 2024-03-26 LAB
OHS CV AF BURDEN PERCENT: < 1
OHS CV BIV PACING PERCENT: 96 %
OHS CV DC REMOTE DEVICE TYPE: NORMAL
OHS CV ICD SHOCK: NO

## 2024-04-16 ENCOUNTER — TELEPHONE (OUTPATIENT)
Dept: ELECTROPHYSIOLOGY | Facility: CLINIC | Age: 69
End: 2024-04-16
Payer: MEDICARE

## 2024-04-16 NOTE — TELEPHONE ENCOUNTER
Attempted to contact patient to discuss reported symptoms. But no answer received. Voicemail left.

## 2024-04-16 NOTE — TELEPHONE ENCOUNTER
----- Message from Sirisha Bradford MA sent at 4/16/2024  4:25 PM CDT -----  Can you assist?  Bc we don't have anything available.    Thanks  ----- Message -----  From: Valentina Joiner  Sent: 4/16/2024   3:00 PM CDT  To: Kristian WANG Staff    Type:  Appointment Request     Name of Caller:Pt  When is the first available appointment?No access  Symptom check up  Would the patient rather a call back or a response via [facility]? Call back  Best Call Back Number:[phone]   Additional Information: Pt states he get winded at night and early mornings. He has been cough and would to get a check as soon as possible.

## 2024-06-08 ENCOUNTER — CLINICAL SUPPORT (OUTPATIENT)
Dept: CARDIOLOGY | Facility: HOSPITAL | Age: 69
End: 2024-06-08
Attending: INTERNAL MEDICINE
Payer: MEDICARE

## 2024-06-08 ENCOUNTER — CLINICAL SUPPORT (OUTPATIENT)
Dept: CARDIOLOGY | Facility: HOSPITAL | Age: 69
End: 2024-06-08
Payer: MEDICARE

## 2024-06-08 DIAGNOSIS — Z95.810 PRESENCE OF AUTOMATIC (IMPLANTABLE) CARDIAC DEFIBRILLATOR: ICD-10-CM

## 2024-06-08 PROCEDURE — 93295 DEV INTERROG REMOTE 1/2/MLT: CPT | Mod: ,,, | Performed by: INTERNAL MEDICINE

## 2024-06-08 PROCEDURE — 93296 REM INTERROG EVL PM/IDS: CPT | Performed by: INTERNAL MEDICINE

## 2024-09-07 ENCOUNTER — CLINICAL SUPPORT (OUTPATIENT)
Dept: CARDIOLOGY | Facility: HOSPITAL | Age: 69
End: 2024-09-07
Payer: MEDICARE

## 2024-09-07 ENCOUNTER — CLINICAL SUPPORT (OUTPATIENT)
Dept: CARDIOLOGY | Facility: HOSPITAL | Age: 69
End: 2024-09-07
Attending: INTERNAL MEDICINE
Payer: MEDICARE

## 2024-09-07 DIAGNOSIS — Z95.810 PRESENCE OF AUTOMATIC (IMPLANTABLE) CARDIAC DEFIBRILLATOR: ICD-10-CM

## 2024-09-07 PROCEDURE — 93295 DEV INTERROG REMOTE 1/2/MLT: CPT | Mod: ,,, | Performed by: INTERNAL MEDICINE

## 2024-09-07 PROCEDURE — 93296 REM INTERROG EVL PM/IDS: CPT | Performed by: INTERNAL MEDICINE

## 2024-09-11 LAB
OHS CV AF BURDEN PERCENT: < 1
OHS CV BIV PACING PERCENT: 95 %
OHS CV DC REMOTE DEVICE TYPE: NORMAL
OHS CV ICD SHOCK: NO

## 2024-11-21 ENCOUNTER — TELEPHONE (OUTPATIENT)
Dept: ELECTROPHYSIOLOGY | Facility: CLINIC | Age: 69
End: 2024-11-21

## 2024-11-21 DIAGNOSIS — I44.7 LBBB (LEFT BUNDLE BRANCH BLOCK): Primary | Chronic | ICD-10-CM

## 2024-11-21 NOTE — TELEPHONE ENCOUNTER
----- Message from Francis Grimm sent at 11/18/2024  4:48 PM CST -----    ----- Message -----  From: Eva Orellana RN  Sent: 11/18/2024  10:06 AM CST  To: Kristian Purvis A Staff    Patient overdue for provider appt with device and EKG before.     [employer] defibrillator    Thanks!  Eva

## 2024-12-07 ENCOUNTER — CLINICAL SUPPORT (OUTPATIENT)
Dept: CARDIOLOGY | Facility: HOSPITAL | Age: 69
End: 2024-12-07

## 2024-12-07 ENCOUNTER — CLINICAL SUPPORT (OUTPATIENT)
Dept: CARDIOLOGY | Facility: HOSPITAL | Age: 69
End: 2024-12-07
Attending: INTERNAL MEDICINE

## 2024-12-07 DIAGNOSIS — Z95.810 PRESENCE OF AUTOMATIC (IMPLANTABLE) CARDIAC DEFIBRILLATOR: ICD-10-CM

## 2024-12-07 PROCEDURE — 93295 DEV INTERROG REMOTE 1/2/MLT: CPT | Mod: ,,, | Performed by: INTERNAL MEDICINE

## 2024-12-07 PROCEDURE — 93296 REM INTERROG EVL PM/IDS: CPT | Performed by: INTERNAL MEDICINE

## 2025-01-31 DIAGNOSIS — Z95.810 PRESENCE OF AUTOMATIC (IMPLANTABLE) CARDIAC DEFIBRILLATOR: Primary | ICD-10-CM

## 2025-02-21 ENCOUNTER — TELEPHONE (OUTPATIENT)
Dept: ELECTROPHYSIOLOGY | Facility: CLINIC | Age: 70
End: 2025-02-21
Payer: MEDICARE

## 2025-03-08 ENCOUNTER — CLINICAL SUPPORT (OUTPATIENT)
Dept: CARDIOLOGY | Facility: HOSPITAL | Age: 70
End: 2025-03-08
Attending: INTERNAL MEDICINE
Payer: MEDICARE

## 2025-03-08 ENCOUNTER — CLINICAL SUPPORT (OUTPATIENT)
Dept: CARDIOLOGY | Facility: HOSPITAL | Age: 70
End: 2025-03-08
Payer: MEDICARE

## 2025-03-08 DIAGNOSIS — Z95.810 PRESENCE OF AUTOMATIC (IMPLANTABLE) CARDIAC DEFIBRILLATOR: ICD-10-CM

## 2025-03-08 PROCEDURE — 93295 DEV INTERROG REMOTE 1/2/MLT: CPT | Mod: ,,, | Performed by: INTERNAL MEDICINE

## 2025-03-08 PROCEDURE — 93296 REM INTERROG EVL PM/IDS: CPT | Performed by: INTERNAL MEDICINE

## 2025-03-25 LAB
OHS CV AF BURDEN PERCENT: < 1
OHS CV BIV PACING PERCENT: 96 %
OHS CV DC REMOTE DEVICE TYPE: NORMAL

## 2025-06-07 ENCOUNTER — CLINICAL SUPPORT (OUTPATIENT)
Dept: CARDIOLOGY | Facility: HOSPITAL | Age: 70
End: 2025-06-07
Payer: MEDICARE

## 2025-06-07 ENCOUNTER — CLINICAL SUPPORT (OUTPATIENT)
Dept: CARDIOLOGY | Facility: HOSPITAL | Age: 70
End: 2025-06-07
Attending: INTERNAL MEDICINE
Payer: MEDICARE

## 2025-06-07 DIAGNOSIS — Z95.810 PRESENCE OF AUTOMATIC (IMPLANTABLE) CARDIAC DEFIBRILLATOR: ICD-10-CM

## 2025-06-07 PROCEDURE — 93295 DEV INTERROG REMOTE 1/2/MLT: CPT | Mod: ,,, | Performed by: INTERNAL MEDICINE

## 2025-06-07 PROCEDURE — 93296 REM INTERROG EVL PM/IDS: CPT | Performed by: INTERNAL MEDICINE

## 2025-06-27 LAB
OHS CV AF BURDEN PERCENT: < 1
OHS CV BIV PACING PERCENT: 97 %
OHS CV DC REMOTE DEVICE TYPE: NORMAL
OHS CV ICD SHOCK: NO

## (undated) DEVICE — PLUG IS4/DF4 PORT

## (undated) DEVICE — COVER PROBE US 5.5X58L NON LTX

## (undated) DEVICE — GUIDEWIRD CPS COURIER FIRM

## (undated) DEVICE — SAFESHEATH II 8FR 13CM

## (undated) DEVICE — CONTRAST VISIPAQUE 150ML

## (undated) DEVICE — WIRE GUIDE WHOLEY MOD J .035

## (undated) DEVICE — SEE MEDLINE ITEM 157187

## (undated) DEVICE — KIT LEFT HEART MANIFOLD CUSTOM

## (undated) DEVICE — PAD RADI FEMORAL

## (undated) DEVICE — SHEATH SAFE ULTRA 9FR

## (undated) DEVICE — CATH BLLN ATTAIN VENOGRAM

## (undated) DEVICE — Device

## (undated) DEVICE — DRESSING AQUACEL FOAM NON 4X4

## (undated) DEVICE — PACK PACER PERMANENT

## (undated) DEVICE — ADHESIVE DERMABOND ADVANCED

## (undated) DEVICE — SHEATH SAFESHEATH II ULTRA 6FR

## (undated) DEVICE — HEMOSTAT VASC BAND REG 24CM

## (undated) DEVICE — PAD DEFIB CADENCE ADULT R2

## (undated) DEVICE — CATH JACKY RADIAL 3.5 110CM

## (undated) DEVICE — ELECTRODE REM PLYHSV RETURN 9

## (undated) DEVICE — TUBING HPCIL ROT M/F ADPT 48IN

## (undated) DEVICE — CATH IMPULSE PIGTAIL 5FR 125CM

## (undated) DEVICE — GUIDEWIRE CPS DIRECT 54CM 7FR

## (undated) DEVICE — WIRE WHISPER MS 014 X 190

## (undated) DEVICE — SLING AND SWATHE UNIV ADLT

## (undated) DEVICE — KIT GLIDESHEATH SLEND 6FR 10CM

## (undated) DEVICE — CATH ATTAIN SELECT II 130